# Patient Record
Sex: FEMALE | Race: WHITE | Employment: OTHER | URBAN - METROPOLITAN AREA
[De-identification: names, ages, dates, MRNs, and addresses within clinical notes are randomized per-mention and may not be internally consistent; named-entity substitution may affect disease eponyms.]

---

## 2022-01-18 PROBLEM — M81.0 OSTEOPOROSIS: Status: ACTIVE | Noted: 2022-01-10

## 2022-01-18 PROBLEM — F32.A DEPRESSION: Status: ACTIVE | Noted: 2020-10-09

## 2022-01-18 PROBLEM — H40.9 GLAUCOMA: Status: ACTIVE | Noted: 2022-01-10

## 2022-01-18 PROBLEM — F03.90 DEMENTIA (H): Status: ACTIVE | Noted: 2022-01-10

## 2022-01-18 PROBLEM — R42 POSTURAL DIZZINESS: Status: ACTIVE | Noted: 2020-07-29

## 2022-01-18 PROBLEM — Z87.898 HISTORY OF PALPITATIONS: Status: ACTIVE | Noted: 2022-01-10

## 2022-01-18 PROBLEM — U07.1 COVID-19: Status: ACTIVE | Noted: 2020-11-27

## 2022-01-18 PROBLEM — G45.9 TIA (TRANSIENT ISCHEMIC ATTACK): Status: ACTIVE | Noted: 2022-01-03

## 2022-01-18 PROBLEM — Z79.01 LONG TERM CURRENT USE OF ANTICOAGULANT: Status: ACTIVE | Noted: 2022-01-10

## 2022-01-18 PROBLEM — E53.8 INADEQUATE VITAMIN B12 INTAKE: Status: ACTIVE | Noted: 2020-06-05

## 2022-01-18 PROBLEM — Z66: Status: ACTIVE | Noted: 2021-06-03

## 2022-01-18 PROBLEM — I25.10 CORONARY ATHEROSCLEROSIS: Status: ACTIVE | Noted: 2022-01-10

## 2022-01-18 PROBLEM — H35.30 MACULAR DEGENERATION: Status: ACTIVE | Noted: 2022-01-10

## 2022-01-18 PROBLEM — F41.1 GENERALIZED ANXIETY DISORDER: Status: ACTIVE | Noted: 2022-01-10

## 2022-01-18 NOTE — PROGRESS NOTES
[facility] GERIATRIC SERVICES  PRIMARY CARE PROVIDER AND CLINIC: Britni Bhagat [facility] [address] [facility]  / [facility]; Phone: [phone]; Fax: [phone]  Chief Complaint   Patient presents with     Hospital F/U     [place] 1/1/2022 - 1/18/2022      [place] Medical Record Number: [id number]  Place of Service where encounter took place: [facility] [[id number]]    Jamia Coley is a 89 year old (2/28/1932), admitted to the above facility from  [place]. Hospital stay 1/1/22 through 1/18/22.  HPI:    Brief Summary of Hospital Course: Jamia Coley lives in assisted living facility at baseline and has a past medical history of CHF, afib, CAD, hypertension, dementia, depression and anxiety, glaucoma, macular degeneration, hypothyroidism, obesity, WATSON. S/he was admitted to the hospital with increased confusion, changes in speech and L facial droop and found to have new CVA in L Lateral clark and R occipital lobe. 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 The hospital stay was complicated with new finding of dysphagia and unable to tolerate oral intake so Gtube was placed.  She also developed thrush, and a rash of unknown origin.    Follow up needed:  -Neurology, [facility], Dr. Hartley, per routine  -consider Vascular follow up if failing medical management    Today:  Jamia is lying in bed and reports no pain, no concerns.  She is able to answer questions with 1-2 words, but is clearly confused.  Daughter reports baseline advanced dementia, but prior to CVA was completely independent in all ADLs and functioned well in her assisted living facility.     CODE STATUS/ADVANCE DIRECTIVES DISCUSSION:  No CPR- Do NOT Intubate   ALLERGIES: Penicillins, Donepezil, Galantamine, and Rivastigmine  PAST MEDICAL HISTORY:  has a past medical history of ACS (acute coronary syndrome) (H) (10/1/2012),  Anxiety, Cataract, Dementia (H) (1/10/2022), History of syncope (8/5/2016), Hypertension, Thyroid disease, and Transient ischemic attack (TIA), and cerebral infarction without residual deficits(V12.54) (11/30/2010).  PAST SURGICAL HISTORY:  has a past surgical history that includes GYN surgery.  FAMILY HISTORY: family history is not on file.  SOCIAL HISTORY:  reports that she does not drink alcohol.  Patient's living condition: lives in an assisted living facility    Post Discharge Medication Reconciliation Status: discharge medications reconciled and changed, per note/orders  Current Outpatient Medications   Medication Sig     acetaminophen (TYLENOL) 32 mg/mL liquid Take 650 mg by mouth every 6 hours as needed for pain 650 mg = 20.3 ml     alendronate (FOSAMAX) 70 MG tablet Take 70 mg by mouth every 7 days On Sundays     amLODIPine (NORVASC) 5 MG tablet 5 mg by Per G Tube route daily     apixaban ANTICOAGULANT (ELIQUIS) 2.5 MG tablet 2.5 mg by Per G Tube route 2 times daily     bisacodyl (DULCOLAX) 10 MG suppository Place 10 mg rectally nightly as needed for constipation     brimonidine (ALPHAGAN) 0.2 % ophthalmic solution Place 1 drop Into the left eye 2 times daily     cetirizine (ZYRTEC) 5 MG tablet 5 mg by Per G Tube route daily     diphenhydrAMINE (BENADRYL) 12.5 MG/5ML solution 25 mg by Per G Tube route every 6 hours as needed for itching     dorzolamide-timolol PF (COSOPT) 22.3-6.8 MG/ML opthalmic solution Place 1 drop Into the left eye 2 times daily     enalapril (VASOTEC) 20 MG tablet Take 20 mg by mouth daily      latanoprost (XALATAN) 0.005 % ophthalmic solution Place 1 drop Into the left eye At Bedtime     levothyroxine (SYNTHROID) 150 MCG tablet 150 mcg by Per G Tube route every evening Patient takes 125 mcg tabs Q AM and 150 mcg tabs Q PM     levothyroxine (SYNTHROID/LEVOTHROID) 125 MCG tablet Take 125 mcg by mouth every morning Patient takes 125 mcg tabs Q AM and 150 mcg tabs Q PM     lisinopril  (ZESTRIL) 20 MG tablet 20 mg by Per G Tube route daily     meclizine (ANTIVERT) 12.5 MG tablet Take 12.5 mg by mouth nightly as needed for dizziness     Multiple Vitamins-Minerals (OCUVITE ADULT 50+) CAPS Take 1 capsule by mouth daily     nystatin (MYCOSTATIN) 561240 UNIT/ML suspension Take 500,000 Units by mouth 4 times daily     polyethylene glycol (MIRALAX) 17 GM/Dose powder Take 17 g by mouth daily     psyllium (METAMUCIL) 28 % packet 1 packet by Per G Tube route 2 times daily     QUEtiapine (SEROQUEL) 25 MG tablet Take 25-50 mg by mouth 2 times daily Scheduled 1 tablet (25 mg) PO Q morning AND 2 tabs (50 mg) PO at bedtime - patient also takes one 50 mh tablet at bedtime PRN     QUEtiapine (SEROQUEL) 50 MG tablet 50 mg by Per G Tube route nightly as needed Patient also takes scheduled 25 mg tablets - 1 tablet (25 mg) PO Q morning AND 2 tabs (50 mg) PO at bedtime     rosuvastatin (CRESTOR) 5 MG tablet 5 mg by Per G Tube route daily     sertraline (ZOLOFT) 20 MG/ML (HIGH CONC) solution 50 mg by Per G Tube route daily 2.5 ml = 50 mg     vitamin B-12 (CYANOCOBALAMIN) 1000 MCG tablet Take 2,500 mcg by mouth every morning     vitamin D3 (CHOLECALCIFEROL) 50 mcg (2000 units) tablet Take 1 tablet by mouth daily     ASPIRIN EC PO Take 325 mg by mouth daily.   (Patient not taking: Reported on 1/19/2022)     atorvastatin (LIPITOR) 40 MG tablet Take 1 tablet by mouth daily. (Patient not taking: Reported on 1/19/2022)     HYDROCHLOROTHIAZIDE PO Take 25 mg by mouth daily.   (Patient not taking: Reported on 1/19/2022)     LORAZEPAM PO Take 0.5 mg by mouth every 8 hours as needed.   (Patient not taking: Reported on 1/19/2022)     metoprolol (LOPRESSOR) 25 MG tablet Take 2 tablets by mouth 2 times daily. Indications: High Blood Pressure (Patient not taking: Reported on 1/19/2022)     multivitamin, therapeutic with minerals (THERA-VIT-M) TABS Take 1 tablet by mouth daily.   (Patient not taking: Reported on 1/19/2022)     Omega-3  "Fatty Acids (OMEGA-3 FISH OIL PO) Take 1 g by mouth daily.   (Patient not taking: Reported on 1/19/2022)     omeprazole 20 MG tablet Take 1 tablet by mouth daily. Take 30-60 minutes before a meal. (Patient not taking: Reported on 1/19/2022)     potassium chloride (KLOR-CON) 20 MEQ packet Take 20 mEq by mouth daily.   (Patient not taking: Reported on 1/19/2022)     RANITIDINE HCL PO Take 150 mg by mouth 2 times daily.   (Patient not taking: Reported on 1/19/2022)     SERTRALINE HCL PO Take 50 mg by mouth daily.   (Patient not taking: Reported on 1/19/2022)     VITAMIN E NATURAL PO Take 400 Units by mouth daily.   (Patient not taking: Reported on 1/19/2022)     No current facility-administered medications for this visit.     ROS:  Unobtainable secondary to cognitive impairment    Vitals:  /70   Pulse 78   Temp 98.8  F (37.1  C)   Resp 18   Ht 1.613 m (5' 3.5\")   Wt 67.1 kg (148 lb)   SpO2 94%   BMI 25.81 kg/m    Exam:  GENERAL APPEARANCE:  Alert, in no acute distress   HEAD:  Normal, normocephalic, atraumatic  EYE EXAM: normal external eye, conjunctiva, lids, MIKE  CHEST/RESP:  respiratory effort normal, no respiratory distress, lung sounds CTA    CV:  Rate reg, rhythm reg, no murmur, no peripheral edema   M/S:   extremities abnormal, gait abnormal-not ambulating at this time, muscle tone flaccid in R UE, digits and nails normal   SKIN EXAM:  Mild R UE edema  NEUROLOGIC EXAM: Cranial nerves 2-12 are grossly normal.  No tremor, gross motor movement at baseline.   PSYCH:  At baseline, has advanced dementia.  She is able to answer simple questions with 1-2 word answers, mood appropriate     Recent labs in EPIC reviewed by me today.  and Care Everywhere     Assessment/Plan:  History of CVA (cerebrovascular accident)  TIA (transient ischemic attack)  Patient with new finding of CVA with significant deficits including dysphagia, word finding difficulty, and R sided weakness.  Requiring total assist with all " ADLs at this time.   -Therapy for strengthening and improved mobility, goal to return to prior level of function   -Speech therapy to evaluate and treat    -monitor for new symptoms     Feeding by G-tube (H)  Dysphagia, unspecified type  Currently has Gtube with gastropexy sutures in place with recommendations to remove those sutures in 10 days (about 1/22/22).  Tolerating Jevity 1.5 at 40 ml/hr continuous at this time.  She also is eating small amounts of puree diet with honey thick liquid.   -continue with speech therapy  -consider repeat video swallow prn     Dementia with behavioral disturbance, unspecified dementia type (H)  Per daughter's report, at baseline has significant dementia.  However, was able to manage all ADLs without assistance at her assisted living facility.   -cognitive testing     Paroxysmal atrial fibrillation (H)  Long term current use of anticoagulant  Per history, no signs of bleeding. Condition and other health concerns as discussed.  She is on apixaban 2.5 mg BID  -continue apixaban      Essential hypertension  Chronic congestive heart failure, unspecified heart failure type (H)  Past medical history significant for hypertension, CHF.  On amlodipine, lisinopril.  BP today within normal limits, no edema, no significant dyspnea.   -monitor for symptoms  -check BMP on 1/24/22     Depression, unspecified depression type  Currently on sertraline, with history significant for depression.  She is also observed to be on quetiapine for 14 days prn for likely symptoms of delirium.    -monitor for symptoms  -PHQ9     Thrush  Severe thrush when in the hospital, tongue with yellowish furry patch in place  -schedule nystatin but will need to swab the mouth QID x 10 days as she is not able to S&S     Orders:    Discontinue scheduled miralax    Clarify nystatin suspension to 5 ml QID x 10 days - apply with mouth swab to oral mucosa and tongue due to thrush    Change ocuvite capsule to house stock Ocuvit  1 tab daily crushed in Gtube for supplement     Labs 1/24/22 to include BMP, CBC, B12     Total time spent with patient visit at the skilled nursing facility was 39 min including patient visit, review of past records and in person discussion with daughter . Greater than 50% of total time spent with counseling and coordinating care due to tenuous situation with patient at high risk for complications due to new CVA, dysphagia, counseling regarding coordination of care and care planning in skilled nursing facility as well as expected length of stay, therapy services, and discharge planning, with patient's goal to return to assisted living facility  when goals have been met .     Electronically signed by:  JOSH Mancilla CNP

## 2022-01-19 ENCOUNTER — TRANSITIONAL CARE UNIT VISIT (OUTPATIENT)
Dept: GERIATRICS | Facility: CLINIC | Age: 87
End: 2022-01-19
Payer: MEDICARE

## 2022-01-19 VITALS
HEART RATE: 78 BPM | WEIGHT: 148 LBS | TEMPERATURE: 98.8 F | RESPIRATION RATE: 18 BRPM | OXYGEN SATURATION: 94 % | BODY MASS INDEX: 25.27 KG/M2 | DIASTOLIC BLOOD PRESSURE: 70 MMHG | SYSTOLIC BLOOD PRESSURE: 120 MMHG | HEIGHT: 64 IN

## 2022-01-19 DIAGNOSIS — Z93.1 FEEDING BY G-TUBE (H): ICD-10-CM

## 2022-01-19 DIAGNOSIS — F03.91 DEMENTIA WITH BEHAVIORAL DISTURBANCE, UNSPECIFIED DEMENTIA TYPE: ICD-10-CM

## 2022-01-19 DIAGNOSIS — Z79.01 LONG TERM CURRENT USE OF ANTICOAGULANT: ICD-10-CM

## 2022-01-19 DIAGNOSIS — I48.0 PAROXYSMAL ATRIAL FIBRILLATION (H): ICD-10-CM

## 2022-01-19 DIAGNOSIS — I50.9 CHRONIC CONGESTIVE HEART FAILURE, UNSPECIFIED HEART FAILURE TYPE (H): ICD-10-CM

## 2022-01-19 DIAGNOSIS — B37.0 THRUSH: ICD-10-CM

## 2022-01-19 DIAGNOSIS — Z86.73 HISTORY OF CVA (CEREBROVASCULAR ACCIDENT): Primary | ICD-10-CM

## 2022-01-19 DIAGNOSIS — F32.A DEPRESSION, UNSPECIFIED DEPRESSION TYPE: ICD-10-CM

## 2022-01-19 DIAGNOSIS — G45.9 TIA (TRANSIENT ISCHEMIC ATTACK): ICD-10-CM

## 2022-01-19 DIAGNOSIS — R13.10 DYSPHAGIA, UNSPECIFIED TYPE: ICD-10-CM

## 2022-01-19 DIAGNOSIS — I10 ESSENTIAL HYPERTENSION: ICD-10-CM

## 2022-01-19 PROBLEM — Z87.898 HISTORY OF PALPITATIONS: Status: RESOLVED | Noted: 2022-01-10 | Resolved: 2022-01-19

## 2022-01-19 PROBLEM — F03.90 DEMENTIA (H): Status: RESOLVED | Noted: 2022-01-10 | Resolved: 2022-01-19

## 2022-01-19 PROBLEM — R42 POSTURAL DIZZINESS: Status: RESOLVED | Noted: 2020-07-29 | Resolved: 2022-01-19

## 2022-01-19 PROBLEM — Z66: Status: RESOLVED | Noted: 2021-06-03 | Resolved: 2022-01-19

## 2022-01-19 PROCEDURE — 99310 SBSQ NF CARE HIGH MDM 45: CPT | Performed by: NURSE PRACTITIONER

## 2022-01-19 RX ORDER — POLYETHYLENE GLYCOL 3350 17 G/17G
17 POWDER, FOR SOLUTION ORAL DAILY
COMMUNITY
Start: 2022-01-19 | End: 2022-01-23

## 2022-01-19 RX ORDER — LISINOPRIL 20 MG/1
20 TABLET ORAL DAILY
COMMUNITY
Start: 2022-01-19

## 2022-01-19 RX ORDER — MV-MN/OM3/DHA/EPA/FISH/LUT/ZEA 250-5-1 MG
1 CAPSULE ORAL DAILY
COMMUNITY
Start: 2022-01-19 | End: 2022-01-23

## 2022-01-19 RX ORDER — LEVOTHYROXINE SODIUM 125 UG/1
125 TABLET ORAL EVERY MORNING
COMMUNITY
Start: 2022-01-19 | End: 2022-01-23

## 2022-01-19 RX ORDER — LATANOPROST 50 UG/ML
1 SOLUTION/ DROPS OPHTHALMIC AT BEDTIME
COMMUNITY
Start: 2022-01-19

## 2022-01-19 RX ORDER — BISACODYL 10 MG
10 SUPPOSITORY, RECTAL RECTAL
COMMUNITY
Start: 2022-01-19 | End: 2022-02-08

## 2022-01-19 RX ORDER — QUETIAPINE FUMARATE 25 MG/1
25-50 TABLET, FILM COATED ORAL 2 TIMES DAILY
COMMUNITY
Start: 2022-01-19 | End: 2022-01-23

## 2022-01-19 RX ORDER — ROSUVASTATIN CALCIUM 5 MG/1
5 TABLET, COATED ORAL DAILY
Status: ON HOLD | COMMUNITY
Start: 2022-01-19 | End: 2022-02-25

## 2022-01-19 RX ORDER — ALENDRONATE SODIUM 70 MG/1
70 TABLET ORAL
COMMUNITY
Start: 2022-01-19

## 2022-01-19 RX ORDER — SERTRALINE HYDROCHLORIDE 20 MG/ML
100 SOLUTION ORAL DAILY
COMMUNITY
Start: 2022-01-19

## 2022-01-19 RX ORDER — CETIRIZINE HYDROCHLORIDE 5 MG/1
5 TABLET ORAL DAILY PRN
COMMUNITY
Start: 2022-01-19 | End: 2022-02-08

## 2022-01-19 RX ORDER — DIPHENHYDRAMINE HCL 12.5MG/5ML
25 LIQUID (ML) ORAL EVERY 6 HOURS PRN
COMMUNITY
Start: 2022-01-19 | End: 2022-01-26

## 2022-01-19 RX ORDER — AMLODIPINE BESYLATE 5 MG/1
5 TABLET ORAL DAILY
COMMUNITY
Start: 2022-01-19 | End: 2022-02-08

## 2022-01-19 RX ORDER — NYSTATIN 100000/ML
500000 SUSPENSION, ORAL (FINAL DOSE FORM) ORAL 4 TIMES DAILY
COMMUNITY
Start: 2022-01-19 | End: 2022-02-08

## 2022-01-19 RX ORDER — BRIMONIDINE TARTRATE 2 MG/ML
1 SOLUTION/ DROPS OPHTHALMIC 2 TIMES DAILY
COMMUNITY
Start: 2022-01-19

## 2022-01-19 RX ORDER — CHOLECALCIFEROL (VITAMIN D3) 50 MCG
1 TABLET ORAL DAILY
COMMUNITY
Start: 2022-01-19

## 2022-01-19 RX ORDER — MECLIZINE HCL 12.5 MG 12.5 MG/1
12.5 TABLET ORAL
COMMUNITY
Start: 2022-01-19 | End: 2022-01-26

## 2022-01-19 RX ORDER — QUETIAPINE FUMARATE 50 MG/1
50 TABLET, FILM COATED ORAL
COMMUNITY
Start: 2022-01-19 | End: 2022-01-23

## 2022-01-19 ASSESSMENT — MIFFLIN-ST. JEOR: SCORE: 1073.38

## 2022-01-19 NOTE — LETTER
1/19/2022        RE: Jamia Coley  [address] [facility] [address]        [facility] GERIATRIC SERVICES  PRIMARY CARE PROVIDER AND CLINIC: Britni Bhagat [facility] [address] [place] [address]  / [place]; Phone: [phone]; Fax: [phone]  Chief Complaint   Patient presents with     Hospital F/U     [place] 1/1/2022 - 1/18/2022      [place] Medical Record Number: [id number]  Place of Service where encounter took place: [facility]) [[id number]]    Jamia Coley is a 89 year old (2/28/1932), admitted to the above facility from  [place]. Hospital stay 1/1/22 through 1/18/22.  HPI:    Brief Summary of Hospital Course: Jamia Coley lives in assisted living facility at baseline and has a past medical history of CHF, afib, CAD, hypertension, dementia, depression and anxiety, glaucoma, macular degeneration, hypothyroidism, obesity, WATSON. S/he was admitted to the hospital with increased confusion, changes in speech and L facial droop and found to have new CVA in L Lateral clark and R occipital lobe. 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 The hospital stay was complicated with new finding of dysphagia and unable to tolerate oral intake so Gtube was placed.  She also developed thrush, and a rash of unknown origin.    Follow up needed:  -Neurology, [facility], Dr. Hartley, per routine  -consider Vascular follow up if failing medical management    Today:  Jamia is lying in bed and reports no pain, no concerns.  She is able to answer questions with 1-2 words, but is clearly confused.  Daughter reports baseline advanced dementia, but prior to CVA was completely independent in all ADLs and functioned well in her assisted living facility.     CODE STATUS/ADVANCE DIRECTIVES DISCUSSION:  No CPR- Do NOT Intubate   ALLERGIES: Penicillins, Donepezil, Galantamine, and Rivastigmine  PAST MEDICAL  HISTORY:  has a past medical history of ACS (acute coronary syndrome) (H) (10/1/2012), Anxiety, Cataract, Dementia (H) (1/10/2022), History of syncope (8/5/2016), Hypertension, Thyroid disease, and Transient ischemic attack (TIA), and cerebral infarction without residual deficits(V12.54) (11/30/2010).  PAST SURGICAL HISTORY:  has a past surgical history that includes GYN surgery.  FAMILY HISTORY: family history is not on file.  SOCIAL HISTORY:  reports that she does not drink alcohol.  Patient's living condition: lives in an assisted living facility    Post Discharge Medication Reconciliation Status: discharge medications reconciled and changed, per note/orders  Current Outpatient Medications   Medication Sig     acetaminophen (TYLENOL) 32 mg/mL liquid Take 650 mg by mouth every 6 hours as needed for pain 650 mg = 20.3 ml     alendronate (FOSAMAX) 70 MG tablet Take 70 mg by mouth every 7 days On Sundays     amLODIPine (NORVASC) 5 MG tablet 5 mg by Per G Tube route daily     apixaban ANTICOAGULANT (ELIQUIS) 2.5 MG tablet 2.5 mg by Per G Tube route 2 times daily     bisacodyl (DULCOLAX) 10 MG suppository Place 10 mg rectally nightly as needed for constipation     brimonidine (ALPHAGAN) 0.2 % ophthalmic solution Place 1 drop Into the left eye 2 times daily     cetirizine (ZYRTEC) 5 MG tablet 5 mg by Per G Tube route daily     diphenhydrAMINE (BENADRYL) 12.5 MG/5ML solution 25 mg by Per G Tube route every 6 hours as needed for itching     dorzolamide-timolol PF (COSOPT) 22.3-6.8 MG/ML opthalmic solution Place 1 drop Into the left eye 2 times daily     enalapril (VASOTEC) 20 MG tablet Take 20 mg by mouth daily      latanoprost (XALATAN) 0.005 % ophthalmic solution Place 1 drop Into the left eye At Bedtime     levothyroxine (SYNTHROID) 150 MCG tablet 150 mcg by Per G Tube route every evening Patient takes 125 mcg tabs Q AM and 150 mcg tabs Q PM     levothyroxine (SYNTHROID/LEVOTHROID) 125 MCG tablet Take 125 mcg by mouth  every morning Patient takes 125 mcg tabs Q AM and 150 mcg tabs Q PM     lisinopril (ZESTRIL) 20 MG tablet 20 mg by Per G Tube route daily     meclizine (ANTIVERT) 12.5 MG tablet Take 12.5 mg by mouth nightly as needed for dizziness     Multiple Vitamins-Minerals (OCUVITE ADULT 50+) CAPS Take 1 capsule by mouth daily     nystatin (MYCOSTATIN) 233598 UNIT/ML suspension Take 500,000 Units by mouth 4 times daily     polyethylene glycol (MIRALAX) 17 GM/Dose powder Take 17 g by mouth daily     psyllium (METAMUCIL) 28 % packet 1 packet by Per G Tube route 2 times daily     QUEtiapine (SEROQUEL) 25 MG tablet Take 25-50 mg by mouth 2 times daily Scheduled 1 tablet (25 mg) PO Q morning AND 2 tabs (50 mg) PO at bedtime - patient also takes one 50 mh tablet at bedtime PRN     QUEtiapine (SEROQUEL) 50 MG tablet 50 mg by Per G Tube route nightly as needed Patient also takes scheduled 25 mg tablets - 1 tablet (25 mg) PO Q morning AND 2 tabs (50 mg) PO at bedtime     rosuvastatin (CRESTOR) 5 MG tablet 5 mg by Per G Tube route daily     sertraline (ZOLOFT) 20 MG/ML (HIGH CONC) solution 50 mg by Per G Tube route daily 2.5 ml = 50 mg     vitamin B-12 (CYANOCOBALAMIN) 1000 MCG tablet Take 2,500 mcg by mouth every morning     vitamin D3 (CHOLECALCIFEROL) 50 mcg (2000 units) tablet Take 1 tablet by mouth daily     ASPIRIN EC PO Take 325 mg by mouth daily.   (Patient not taking: Reported on 1/19/2022)     atorvastatin (LIPITOR) 40 MG tablet Take 1 tablet by mouth daily. (Patient not taking: Reported on 1/19/2022)     HYDROCHLOROTHIAZIDE PO Take 25 mg by mouth daily.   (Patient not taking: Reported on 1/19/2022)     LORAZEPAM PO Take 0.5 mg by mouth every 8 hours as needed.   (Patient not taking: Reported on 1/19/2022)     metoprolol (LOPRESSOR) 25 MG tablet Take 2 tablets by mouth 2 times daily. Indications: High Blood Pressure (Patient not taking: Reported on 1/19/2022)     multivitamin, therapeutic with minerals (THERA-VIT-M) TABS Take  "1 tablet by mouth daily.   (Patient not taking: Reported on 1/19/2022)     Omega-3 Fatty Acids (OMEGA-3 FISH OIL PO) Take 1 g by mouth daily.   (Patient not taking: Reported on 1/19/2022)     omeprazole 20 MG tablet Take 1 tablet by mouth daily. Take 30-60 minutes before a meal. (Patient not taking: Reported on 1/19/2022)     potassium chloride (KLOR-CON) 20 MEQ packet Take 20 mEq by mouth daily.   (Patient not taking: Reported on 1/19/2022)     RANITIDINE HCL PO Take 150 mg by mouth 2 times daily.   (Patient not taking: Reported on 1/19/2022)     SERTRALINE HCL PO Take 50 mg by mouth daily.   (Patient not taking: Reported on 1/19/2022)     VITAMIN E NATURAL PO Take 400 Units by mouth daily.   (Patient not taking: Reported on 1/19/2022)     No current facility-administered medications for this visit.     ROS:  Unobtainable secondary to cognitive impairment    Vitals:  /70   Pulse 78   Temp 98.8  F (37.1  C)   Resp 18   Ht 1.613 m (5' 3.5\")   Wt 67.1 kg (148 lb)   SpO2 94%   BMI 25.81 kg/m    Exam:  GENERAL APPEARANCE:  Alert, in no acute distress   HEAD:  Normal, normocephalic, atraumatic  EYE EXAM: normal external eye, conjunctiva, lids, MIKE  CHEST/RESP:  respiratory effort normal, no respiratory distress, lung sounds CTA    CV:  Rate reg, rhythm reg, no murmur, no peripheral edema   M/S:   extremities abnormal, gait abnormal-not ambulating at this time, muscle tone flaccid in R UE, digits and nails normal   SKIN EXAM:  Mild R UE edema  NEUROLOGIC EXAM: Cranial nerves 2-12 are grossly normal.  No tremor, gross motor movement at baseline.   PSYCH:  At baseline, has advanced dementia.  She is able to answer simple questions with 1-2 word answers, mood appropriate     Recent labs in EPIC reviewed by me today.  and Care Everywhere     Assessment/Plan:  History of CVA (cerebrovascular accident)  TIA (transient ischemic attack)  Patient with new finding of CVA with significant deficits including dysphagia, " word finding difficulty, and R sided weakness.  Requiring total assist with all ADLs at this time.   -Therapy for strengthening and improved mobility, goal to return to prior level of function   -Speech therapy to evaluate and treat    -monitor for new symptoms     Feeding by G-tube (H)  Dysphagia, unspecified type  Currently has Gtube with gastropexy sutures in place with recommendations to remove those sutures in 10 days (about 1/22/22).  Tolerating Jevity 1.5 at 40 ml/hr continuous at this time.  She also is eating small amounts of puree diet with honey thick liquid.   -continue with speech therapy  -consider repeat video swallow prn     Dementia with behavioral disturbance, unspecified dementia type (H)  Per daughter's report, at baseline has significant dementia.  However, was able to manage all ADLs without assistance at her assisted living facility.   -cognitive testing     Paroxysmal atrial fibrillation (H)  Long term current use of anticoagulant  Per history, no signs of bleeding. Condition and other health concerns as discussed.  She is on apixaban 2.5 mg BID  -continue apixaban      Essential hypertension  Chronic congestive heart failure, unspecified heart failure type (H)  Past medical history significant for hypertension, CHF.  On amlodipine, lisinopril.  BP today within normal limits, no edema, no significant dyspnea.   -monitor for symptoms  -check BMP on 1/24/22     Depression, unspecified depression type  Currently on sertraline, with history significant for depression.  She is also observed to be on quetiapine for 14 days prn for likely symptoms of delirium.    -monitor for symptoms  -PHQ9     Thrush  Severe thrush when in the hospital, tongue with yellowish furry patch in place  -schedule nystatin but will need to swab the mouth QID x 10 days as she is not able to S&S     Orders:    Discontinue scheduled miralax    Clarify nystatin suspension to 5 ml QID x 10 days - apply with mouth swab to oral  mucosa and tongue due to thrush    Change ocuvite capsule to house stock Ocuvit 1 tab daily crushed in Gtube for supplement     Labs 1/24/22 to include BMP, CBC, B12     Total time spent with patient visit at the skilled nursing facility was 39 min including patient visit, review of past records and in person discussion with daughter . Greater than 50% of total time spent with counseling and coordinating care due to tenuous situation with patient at high risk for complications due to new CVA, dysphagia, counseling regarding coordination of care and care planning in skilled nursing facility as well as expected length of stay, therapy services, and discharge planning, with patient's goal to return to assisted living facility  when goals have been met .     Electronically signed by:  JOSH Mancilla CNP         Sincerely,        JOSH Mancilla CNP

## 2022-01-20 RX ORDER — VITS A,C,E/LUTEIN/MINERALS 300MCG-200
1 TABLET ORAL DAILY
COMMUNITY

## 2022-01-21 NOTE — PROGRESS NOTES
[facility] GERIATRICS    Chief Complaint   Patient presents with     RECHECK     HPI: Jamia oCley is a 89 year old (2/28/1932), who is being seen today for an episodic care visit at: [facility] (U) [[id number]].     Brief Summary of Hospital Course: Jamia Coley lives in assisted living facility at baseline and has a past medical history of CHF, afib, CAD, hypertension, dementia, depression and anxiety, glaucoma, macular degeneration, hypothyroidism, obesity, WATSON. S/he was admitted to the hospital with increased confusion, changes in speech and L facial droop and found to have new CVA in L Lateral clark and R occipital lobe. 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 The hospital stay was complicated with new finding of dysphagia and unable to tolerate oral intake so Gtube was placed.  She also developed thrush, and a rash of unknown origin.    Follow up needed:  -Neurology, [facility], Dr. Hartley, per routine  -consider Vascular follow up if failing medical management     Recent changes:  -1/19 - discontinue scheduled miralax, clarify nystatin S&S    Chief Concerns Today:  Nursing staff notes some R UE edema which is troublesome to patient and causing some discomfort.  Due to have gastropexy button sutures removed today.  Deacon seems to be tolerating gtube feedings overnight.     Allergies, as well as Past Medical, Surgical, and Family History reviewed in Epic.    Medication list reviewed and reconciled.  Current Outpatient Medications:      acetaminophen (TYLENOL) 32 mg/mL liquid, Take 650 mg by mouth every 6 hours as needed for pain 650 mg = 20.3 ml, Disp: , Rfl:      alendronate (FOSAMAX) 70 MG tablet, Take 70 mg by mouth every 7 days On Sundays, Disp: , Rfl:      amLODIPine (NORVASC) 5 MG tablet, 5 mg by Per G Tube route daily, Disp: , Rfl:      apixaban ANTICOAGULANT (ELIQUIS) 2.5 MG tablet, 2.5 mg by Per G  Tube route 2 times daily, Disp: , Rfl:      bisacodyl (DULCOLAX) 10 MG suppository, Place 10 mg rectally nightly as needed for constipation, Disp: , Rfl:      brimonidine (ALPHAGAN) 0.2 % ophthalmic solution, Place 1 drop Into the left eye 2 times daily, Disp: , Rfl:      cetirizine (ZYRTEC) 5 MG tablet, 5 mg by Per G Tube route daily, Disp: , Rfl:      diphenhydrAMINE (BENADRYL) 12.5 MG/5ML solution, 25 mg by Per G Tube route every 6 hours as needed for itching, Disp: , Rfl:      dorzolamide-timolol PF (COSOPT) 22.3-6.8 MG/ML opthalmic solution, Place 1 drop Into the left eye 2 times daily, Disp: , Rfl:      latanoprost (XALATAN) 0.005 % ophthalmic solution, Place 1 drop Into the left eye At Bedtime, Disp: , Rfl:      levothyroxine (SYNTHROID) 150 MCG tablet, 150 mcg by Per G Tube route every evening, Disp: , Rfl:      lisinopril (ZESTRIL) 20 MG tablet, 20 mg by Per G Tube route daily, Disp: , Rfl:      meclizine (ANTIVERT) 12.5 MG tablet, Take 12.5 mg by mouth nightly as needed for dizziness, Disp: , Rfl:      multivitamin (OCUVITE) TABS tablet, Take 1 tablet by mouth daily, Disp: , Rfl:      nystatin (MYCOSTATIN) 995818 UNIT/ML suspension, Take 500,000 Units by mouth 4 times daily Swab oral mucosa, Disp: , Rfl:      psyllium (METAMUCIL) 28 % packet, 1 packet by Per G Tube route 2 times daily, Disp: , Rfl:      QUEtiapine (SEROQUEL) 50 MG tablet, Take 50 mg by mouth nightly as needed, Disp: , Rfl:      rosuvastatin (CRESTOR) 5 MG tablet, 5 mg by Per G Tube route daily, Disp: , Rfl:      sertraline (ZOLOFT) 20 MG/ML (HIGH CONC) solution, 50 mg by Per G Tube route daily 2.5 ml = 50 mg, Disp: , Rfl:      vitamin B-12 (CYANOCOBALAMIN) 1000 MCG tablet, Take 2,500 mcg by mouth every morning, Disp: , Rfl:      vitamin D3 (CHOLECALCIFEROL) 50 mcg (2000 units) tablet, Take 1 tablet by mouth daily, Disp: , Rfl:     ROS:  Limited secondary to cognitive impairment but today pt reports no pain, no other new concerns  "    Vitals:  /68   Pulse 76   Temp 97.3  F (36.3  C)   Resp 15   Ht 1.676 m (5' 6\")   Wt 67.7 kg (149 lb 4.8 oz)   SpO2 96%   BMI 24.10 kg/m    Exam:  GENERAL APPEARANCE:  Alert, in no distress   HEAD:  Normal, normocephalic, atraumatic  EYE EXAM: normal external eye, conjunctiva, lids, MIKE  CHEST/RESP:  respiratory effort normal, no respiratory distress, lung sounds CTA    CV:  Rate reg, rhythm reg, no murmur, mild dependent peripheral edema R UE  M/S:   extremities abnormal, gait abnormal-walking only short distances with rolling walker , muscle tone decreased on R , digits and nails normal   SKIN EXAM:  R hand slightly swollen and cool, good pulses, dependent edema , abdomen at Gtube site is slightly reddened and irritated, tender to touch   NEUROLOGIC EXAM: Cranial nerves 2-12 are grossly normal.  No tremor, gross motor movement at baseline.   PSYCH:  at baseline mentation, alert and oriented , mood appropriate       Most Recent 3 CBC's:Recent Labs   Lab Test 01/24/22  0525   WBC 8.8   HGB 13.4   *        Most Recent 3 BMP's:Recent Labs   Lab Test 01/24/22  0525      POTASSIUM 3.8   CHLORIDE 102   CO2 32   BUN 23   CR 0.66   ANIONGAP 4   KYLE 8.9   *     Most Recent Anemia Panel:Recent Labs   Lab Test 01/24/22  0525   WBC 8.8   HGB 13.4   HCT 41.9   *      B12 1,526*      Assessment/Plan:  History of CVA (cerebrovascular accident)  Patient with R UE edema, likely due to CVA.  Mild compression has been difficulty and painful at times over the weekend.  Starting elevation of extremity today.   -continue to monitor     Dysphagia, unspecified type  Feeding by G-tube (H)  Feeding now nocturnal to support oral nutrition daytimes. Tolerating this so far. Weight stable.     Dementia with behavioral disturbance, unspecified dementia type (H)  Confused at baseline, this remains stable.     Edema of right upper arm  Slightly improved from report today. Trying " compression      Orders:    Discontinue the following due to non-use  o Benadryl  o Meclizine  o Prn quetiapine     Discontinue Vit B12    Change cetirizine to 5 mg daily po prn for itching     Follow up routinely     Electronically signed by: JOSH Mancilla CNP

## 2022-01-23 RX ORDER — QUETIAPINE FUMARATE 50 MG/1
50 TABLET, FILM COATED ORAL
COMMUNITY
End: 2022-01-26

## 2022-01-24 ENCOUNTER — LAB REQUISITION (OUTPATIENT)
Dept: LAB | Facility: CLINIC | Age: 87
End: 2022-01-24
Payer: MEDICARE

## 2022-01-24 ENCOUNTER — TRANSITIONAL CARE UNIT VISIT (OUTPATIENT)
Dept: GERIATRICS | Facility: CLINIC | Age: 87
End: 2022-01-24
Payer: MEDICARE

## 2022-01-24 ENCOUNTER — DOCUMENTATION ONLY (OUTPATIENT)
Dept: OTHER | Facility: CLINIC | Age: 87
End: 2022-01-24

## 2022-01-24 VITALS
SYSTOLIC BLOOD PRESSURE: 131 MMHG | OXYGEN SATURATION: 96 % | HEIGHT: 66 IN | WEIGHT: 149.3 LBS | TEMPERATURE: 97.3 F | RESPIRATION RATE: 15 BRPM | BODY MASS INDEX: 23.99 KG/M2 | HEART RATE: 76 BPM | DIASTOLIC BLOOD PRESSURE: 68 MMHG

## 2022-01-24 DIAGNOSIS — I10 ESSENTIAL (PRIMARY) HYPERTENSION: ICD-10-CM

## 2022-01-24 DIAGNOSIS — D64.9 ANEMIA, UNSPECIFIED: ICD-10-CM

## 2022-01-24 DIAGNOSIS — R60.0 EDEMA OF RIGHT UPPER ARM: ICD-10-CM

## 2022-01-24 DIAGNOSIS — E55.9 VITAMIN D DEFICIENCY, UNSPECIFIED: ICD-10-CM

## 2022-01-24 DIAGNOSIS — Z93.1 FEEDING BY G-TUBE (H): ICD-10-CM

## 2022-01-24 DIAGNOSIS — F03.91 DEMENTIA WITH BEHAVIORAL DISTURBANCE, UNSPECIFIED DEMENTIA TYPE: ICD-10-CM

## 2022-01-24 DIAGNOSIS — R13.10 DYSPHAGIA, UNSPECIFIED TYPE: ICD-10-CM

## 2022-01-24 DIAGNOSIS — Z86.73 HISTORY OF CVA (CEREBROVASCULAR ACCIDENT): Primary | ICD-10-CM

## 2022-01-24 LAB
ANION GAP SERPL CALCULATED.3IONS-SCNC: 4 MMOL/L (ref 3–14)
BUN SERPL-MCNC: 23 MG/DL (ref 7–30)
CALCIUM SERPL-MCNC: 8.9 MG/DL (ref 8.5–10.1)
CHLORIDE BLD-SCNC: 102 MMOL/L (ref 94–109)
CO2 SERPL-SCNC: 32 MMOL/L (ref 20–32)
CREAT SERPL-MCNC: 0.66 MG/DL (ref 0.52–1.04)
ERYTHROCYTE [DISTWIDTH] IN BLOOD BY AUTOMATED COUNT: 14.1 % (ref 10–15)
GFR SERPL CREATININE-BSD FRML MDRD: 83 ML/MIN/1.73M2
GLUCOSE BLD-MCNC: 129 MG/DL (ref 70–99)
HCT VFR BLD AUTO: 41.9 % (ref 35–47)
HGB BLD-MCNC: 13.4 G/DL (ref 11.7–15.7)
HOLD SPECIMEN: NORMAL
MCH RBC QN AUTO: 32.4 PG (ref 26.5–33)
MCHC RBC AUTO-ENTMCNC: 32 G/DL (ref 31.5–36.5)
MCV RBC AUTO: 102 FL (ref 78–100)
PLATELET # BLD AUTO: 276 10E3/UL (ref 150–450)
POTASSIUM BLD-SCNC: 3.8 MMOL/L (ref 3.4–5.3)
RBC # BLD AUTO: 4.13 10E6/UL (ref 3.8–5.2)
SODIUM SERPL-SCNC: 138 MMOL/L (ref 133–144)
VIT B12 SERPL-MCNC: 1526 PG/ML (ref 193–986)
WBC # BLD AUTO: 8.8 10E3/UL (ref 4–11)

## 2022-01-24 PROCEDURE — P9603 ONE-WAY ALLOW PRORATED MILES: HCPCS | Performed by: NURSE PRACTITIONER

## 2022-01-24 PROCEDURE — 36415 COLL VENOUS BLD VENIPUNCTURE: CPT | Performed by: NURSE PRACTITIONER

## 2022-01-24 PROCEDURE — 82607 VITAMIN B-12: CPT | Performed by: NURSE PRACTITIONER

## 2022-01-24 PROCEDURE — 99309 SBSQ NF CARE MODERATE MDM 30: CPT | Performed by: NURSE PRACTITIONER

## 2022-01-24 PROCEDURE — 80048 BASIC METABOLIC PNL TOTAL CA: CPT | Performed by: NURSE PRACTITIONER

## 2022-01-24 PROCEDURE — 85027 COMPLETE CBC AUTOMATED: CPT | Performed by: NURSE PRACTITIONER

## 2022-01-24 ASSESSMENT — MIFFLIN-ST. JEOR: SCORE: 1118.97

## 2022-01-24 NOTE — LETTER
1/24/2022        RE: Jamia Coley  [address] [address]        [facility] GERIATRICS    Chief Complaint   Patient presents with     RECHECK     HPI: Jamia Coley is a 89 year old (2/28/1932), who is being seen today for an episodic care visit at: [facility] ([facility]) [[id number]].     Brief Summary of Hospital Course: Jamia Coley lives in assisted living facility at baseline and has a past medical history of CHF, afib, CAD, hypertension, dementia, depression and anxiety, glaucoma, macular degeneration, hypothyroidism, obesity, WATSON. S/he was admitted to the hospital with increased confusion, changes in speech and L facial droop and found to have new CVA in L Lateral clark and R occipital lobe. 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 The hospital stay was complicated with new finding of dysphagia and unable to tolerate oral intake so Gtube was placed.  She also developed thrush, and a rash of unknown origin.    Follow up needed:  -Neurology, [facility], Dr. Hartley, per routine  -consider Vascular follow up if failing medical management     Recent changes:  -1/19 - discontinue scheduled miralax, clarify nystatin S&S    Chief Concerns Today:  Nursing staff notes some R UE edema which is troublesome to patient and causing some discomfort.  Due to have gastropexy button sutures removed today.  Deacon seems to be tolerating gtube feedings overnight.     Allergies, as well as Past Medical, Surgical, and Family History reviewed in Epic.    Medication list reviewed and reconciled.  Current Outpatient Medications:      acetaminophen (TYLENOL) 32 mg/mL liquid, Take 650 mg by mouth every 6 hours as needed for pain 650 mg = 20.3 ml, Disp: , Rfl:      alendronate (FOSAMAX) 70 MG tablet, Take 70 mg by mouth every 7 days On Sundays, Disp: , Rfl:      amLODIPine (NORVASC) 5 MG tablet, 5 mg by Per G Tube route daily,  Disp: , Rfl:      apixaban ANTICOAGULANT (ELIQUIS) 2.5 MG tablet, 2.5 mg by Per G Tube route 2 times daily, Disp: , Rfl:      bisacodyl (DULCOLAX) 10 MG suppository, Place 10 mg rectally nightly as needed for constipation, Disp: , Rfl:      brimonidine (ALPHAGAN) 0.2 % ophthalmic solution, Place 1 drop Into the left eye 2 times daily, Disp: , Rfl:      cetirizine (ZYRTEC) 5 MG tablet, 5 mg by Per G Tube route daily, Disp: , Rfl:      diphenhydrAMINE (BENADRYL) 12.5 MG/5ML solution, 25 mg by Per G Tube route every 6 hours as needed for itching, Disp: , Rfl:      dorzolamide-timolol PF (COSOPT) 22.3-6.8 MG/ML opthalmic solution, Place 1 drop Into the left eye 2 times daily, Disp: , Rfl:      latanoprost (XALATAN) 0.005 % ophthalmic solution, Place 1 drop Into the left eye At Bedtime, Disp: , Rfl:      levothyroxine (SYNTHROID) 150 MCG tablet, 150 mcg by Per G Tube route every evening, Disp: , Rfl:      lisinopril (ZESTRIL) 20 MG tablet, 20 mg by Per G Tube route daily, Disp: , Rfl:      meclizine (ANTIVERT) 12.5 MG tablet, Take 12.5 mg by mouth nightly as needed for dizziness, Disp: , Rfl:      multivitamin (OCUVITE) TABS tablet, Take 1 tablet by mouth daily, Disp: , Rfl:      nystatin (MYCOSTATIN) 167601 UNIT/ML suspension, Take 500,000 Units by mouth 4 times daily Swab oral mucosa, Disp: , Rfl:      psyllium (METAMUCIL) 28 % packet, 1 packet by Per G Tube route 2 times daily, Disp: , Rfl:      QUEtiapine (SEROQUEL) 50 MG tablet, Take 50 mg by mouth nightly as needed, Disp: , Rfl:      rosuvastatin (CRESTOR) 5 MG tablet, 5 mg by Per G Tube route daily, Disp: , Rfl:      sertraline (ZOLOFT) 20 MG/ML (HIGH CONC) solution, 50 mg by Per G Tube route daily 2.5 ml = 50 mg, Disp: , Rfl:      vitamin B-12 (CYANOCOBALAMIN) 1000 MCG tablet, Take 2,500 mcg by mouth every morning, Disp: , Rfl:      vitamin D3 (CHOLECALCIFEROL) 50 mcg (2000 units) tablet, Take 1 tablet by mouth daily, Disp: , Rfl:     ROS:  Limited secondary to  "cognitive impairment but today pt reports no pain, no other new concerns     Vitals:  /68   Pulse 76   Temp 97.3  F (36.3  C)   Resp 15   Ht 1.676 m (5' 6\")   Wt 67.7 kg (149 lb 4.8 oz)   SpO2 96%   BMI 24.10 kg/m    Exam:  GENERAL APPEARANCE:  Alert, in no distress   HEAD:  Normal, normocephalic, atraumatic  EYE EXAM: normal external eye, conjunctiva, lids, MIKE  CHEST/RESP:  respiratory effort normal, no respiratory distress, lung sounds CTA    CV:  Rate reg, rhythm reg, no murmur, mild dependent peripheral edema R UE  M/S:   extremities abnormal, gait abnormal-walking only short distances with rolling walker , muscle tone decreased on R , digits and nails normal   SKIN EXAM:  R hand slightly swollen and cool, good pulses, dependent edema , abdomen at Gtube site is slightly reddened and irritated, tender to touch   NEUROLOGIC EXAM: Cranial nerves 2-12 are grossly normal.  No tremor, gross motor movement at baseline.   PSYCH:  at baseline mentation, alert and oriented , mood appropriate       Most Recent 3 CBC's:Recent Labs   Lab Test 01/24/22  0525   WBC 8.8   HGB 13.4   *        Most Recent 3 BMP's:Recent Labs   Lab Test 01/24/22  0525      POTASSIUM 3.8   CHLORIDE 102   CO2 32   BUN 23   CR 0.66   ANIONGAP 4   KYLE 8.9   *     Most Recent Anemia Panel:Recent Labs   Lab Test 01/24/22  0525   WBC 8.8   HGB 13.4   HCT 41.9   *      B12 1,526*      Assessment/Plan:  History of CVA (cerebrovascular accident)  Patient with R UE edema, likely due to CVA.  Mild compression has been difficulty and painful at times over the weekend.  Starting elevation of extremity today.   -continue to monitor     Dysphagia, unspecified type  Feeding by G-tube (H)  Feeding now nocturnal to support oral nutrition daytimes. Tolerating this so far. Weight stable.     Dementia with behavioral disturbance, unspecified dementia type (H)  Confused at baseline, this remains stable.     Edema " of right upper arm  Slightly improved from report today. Trying compression      Orders:    Discontinue the following due to non-use  o Benadryl  o Meclizine  o Prn quetiapine     Discontinue Vit B12    Change cetirizine to 5 mg daily po prn for itching     Follow up routinely     Electronically signed by: JOSH Mancilla CNP         Sincerely,        JOSH Mancilla CNP

## 2022-01-26 NOTE — PROGRESS NOTES
[facility] GERIATRICS    Chief Complaint   Patient presents with     RECHECK     HPI: Jamia Coley is a 89 year old (2/28/1932), who is being seen today for an episodic care visit at: [facility] (U) [[id number]].     Brief Summary of Hospital Course: Jamia Coley lives in assisted living facility at baseline and has a past medical history of CHF, afib, CAD, hypertension, dementia, depression and anxiety, glaucoma, macular degeneration, hypothyroidism, obesity, WATSON. S/he was admitted to the hospital with increased confusion, changes in speech and L facial droop and found to have new CVA in L Lateral clark and R occipital lobe. 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 The hospital stay was complicated with new finding of dysphagia and unable to tolerate oral intake so Gtube was placed.  She also developed thrush, and a rash of unknown origin.    Follow up needed:  -Neurology, [facility], Dr. Hartley, per routine  -consider Vascular follow up if failing medical management     Recent changes:  -1/19 - discontinue scheduled miralax, clarify nystatin S&S  -1/24 - discontinue benadryl, meclizine, prn quetiapine, vit B12 and decrease cetirizine to prn   -1/27 - MOCA 1/30, dependent for all ADLs and transfers    Chief Concerns Today:  Jamia is restless, agitated and has difficulty with expressing herself due to significant hard of hearing and ongoing dementia.  She is observed sitting in wheelchair or recliner restlessly, with furrowed brow and frequently calling out.     Allergies, as well as Past Medical, Surgical, and Family History reviewed in Epic.    Medication list reviewed and reconciled.  Current Outpatient Medications:      acetaminophen (TYLENOL) 32 mg/mL liquid, Take 650 mg by mouth every 6 hours as needed for pain 650 mg = 20.3 ml, Disp: , Rfl:      alendronate (FOSAMAX) 70 MG tablet, Take 70 mg by mouth  "every 7 days On Sundays, Disp: , Rfl:      amLODIPine (NORVASC) 5 MG tablet, 5 mg by Per G Tube route daily, Disp: , Rfl:      apixaban ANTICOAGULANT (ELIQUIS) 2.5 MG tablet, 2.5 mg by Per G Tube route 2 times daily, Disp: , Rfl:      bisacodyl (DULCOLAX) 10 MG suppository, Place 10 mg rectally nightly as needed for constipation, Disp: , Rfl:      brimonidine (ALPHAGAN) 0.2 % ophthalmic solution, Place 1 drop Into the left eye 2 times daily, Disp: , Rfl:      cetirizine (ZYRTEC) 5 MG tablet, 5 mg by Per G Tube route daily as needed, Disp: , Rfl:      dorzolamide-timolol PF (COSOPT) 22.3-6.8 MG/ML opthalmic solution, Place 1 drop Into the left eye 2 times daily, Disp: , Rfl:      latanoprost (XALATAN) 0.005 % ophthalmic solution, Place 1 drop Into the left eye At Bedtime, Disp: , Rfl:      levothyroxine (SYNTHROID) 150 MCG tablet, 150 mcg by Per G Tube route every evening, Disp: , Rfl:      lisinopril (ZESTRIL) 20 MG tablet, 20 mg by Per G Tube route daily, Disp: , Rfl:      multivitamin (OCUVITE) TABS tablet, Take 1 tablet by mouth daily, Disp: , Rfl:      nystatin (MYCOSTATIN) 901642 UNIT/ML suspension, Take 500,000 Units by mouth 4 times daily Swab oral mucosa, Disp: , Rfl:      psyllium (METAMUCIL) 28 % packet, 1 packet by Per G Tube route 2 times daily, Disp: , Rfl:      rosuvastatin (CRESTOR) 5 MG tablet, 5 mg by Per G Tube route daily, Disp: , Rfl:      sertraline (ZOLOFT) 20 MG/ML (HIGH CONC) solution, 50 mg by Per G Tube route daily 2.5 ml = 50 mg, Disp: , Rfl:      vitamin D3 (CHOLECALCIFEROL) 50 mcg (2000 units) tablet, Take 1 tablet by mouth daily, Disp: , Rfl:     ROS:  Limited secondary to cognitive impairment but today pt reports she is not in pain    Vitals:  /72   Pulse 73   Temp 98.1  F (36.7  C)   Resp 19   Ht 1.676 m (5' 6\")   Wt 67.5 kg (148 lb 12.8 oz)   SpO2 95%   BMI 24.02 kg/m    Exam:  GENERAL APPEARANCE:  Alert, in obvious restless distress   HEAD:  Normal, normocephalic, " atraumatic  EYE EXAM: normal external eye, conjunctiva, lids, MIKE  CHEST/RESP:  respiratory effort normal, no respiratory distress, lung sounds essentially clear but she is unable to follow directions   CV:  Rate reg, rhythm reg, no murmur, 2+ peripheral edema bilateral LE and R UE   : urine reported to be foul smelling and cloudy  M/S:   extremities normal, gait abnormal-requires total assist to ambulate , muscle tone normal , digits and nails normal   SKIN EXAM:  Bilateral LE with diffuse spider veins, mild edema and no redness  NEUROLOGIC EXAM: Cranial nerves 2-12 are grossly normal.  No tremor, gross motor movement at baseline.   PSYCH:  at baseline mentation with significant dementia, today is restless and agitated, mood anxious and restless    Recent labs in [facility] reviewed by me today.      Assessment/Plan:  History of CVA (cerebrovascular accident)  Dysphagia, unspecified type  Feeding by G-tube (H)  Patient with CVA and dysphagia, feedings through Gtube and eating small amounts on her own.  She is significantly more restless last few days.   -Therapy for diet, strengthening and improved mobility, goal to return to prior level of function    -check UA for infection     Dementia with behavioral disturbance, unspecified dementia type (H)  Chronic condition, ongoing decline expected. Her agitation is worse as she is unable to understand her surroundings, unable to clearly define concerns.   -schedule tylenol as she may be in pain     Chronic congestive heart failure, unspecified heart failure type (H)  Known history of CHF, with most recent echo showing EF of 25-30% without LVH.  On amlodipine, lisinopril.  Now with some bilateral LE edema, no obvious congestion but she is unable to effectively follow directions for deep breathing, unable to endorse feelings of dyspnea    -start lasix and K+ as this may be contributing to agitation as well       Orders:    Labs 1/28/22 to include BNP, BMP, CBC  w/diff    UA/conditional UC via strait cath today for LOC change    OK for tubigrips/tensoshapes, on in am and off at hs diagnosis LE edema    Schedule tylenol 20.3 ml QID for pain  Per gtube    Lasix 20 mg daily for CHF, per gtube    Potassium 10 meq per gtube daily for supplement on lasix     Follow up with in 1 week      Electronically signed by: JOSH Mancilla CNP

## 2022-01-27 ENCOUNTER — TRANSITIONAL CARE UNIT VISIT (OUTPATIENT)
Dept: GERIATRICS | Facility: CLINIC | Age: 87
End: 2022-01-27
Payer: MEDICARE

## 2022-01-27 ENCOUNTER — LAB REQUISITION (OUTPATIENT)
Dept: LAB | Facility: CLINIC | Age: 87
End: 2022-01-27
Payer: MEDICARE

## 2022-01-27 VITALS
BODY MASS INDEX: 23.91 KG/M2 | SYSTOLIC BLOOD PRESSURE: 135 MMHG | OXYGEN SATURATION: 95 % | HEIGHT: 66 IN | RESPIRATION RATE: 19 BRPM | HEART RATE: 73 BPM | TEMPERATURE: 98.1 F | DIASTOLIC BLOOD PRESSURE: 72 MMHG | WEIGHT: 148.8 LBS

## 2022-01-27 DIAGNOSIS — R13.10 DYSPHAGIA, UNSPECIFIED TYPE: ICD-10-CM

## 2022-01-27 DIAGNOSIS — I50.9 CHRONIC CONGESTIVE HEART FAILURE, UNSPECIFIED HEART FAILURE TYPE (H): ICD-10-CM

## 2022-01-27 DIAGNOSIS — Z93.1 FEEDING BY G-TUBE (H): ICD-10-CM

## 2022-01-27 DIAGNOSIS — Z86.73 HISTORY OF CVA (CEREBROVASCULAR ACCIDENT): Primary | ICD-10-CM

## 2022-01-27 DIAGNOSIS — R30.0 DYSURIA: ICD-10-CM

## 2022-01-27 DIAGNOSIS — F03.91 DEMENTIA WITH BEHAVIORAL DISTURBANCE, UNSPECIFIED DEMENTIA TYPE: ICD-10-CM

## 2022-01-27 LAB
ALBUMIN UR-MCNC: NEGATIVE MG/DL
APPEARANCE UR: ABNORMAL
BILIRUB UR QL STRIP: NEGATIVE
COLOR UR AUTO: YELLOW
GLUCOSE UR STRIP-MCNC: NEGATIVE MG/DL
HGB UR QL STRIP: NEGATIVE
KETONES UR STRIP-MCNC: NEGATIVE MG/DL
LEUKOCYTE ESTERASE UR QL STRIP: NEGATIVE
MUCOUS THREADS #/AREA URNS LPF: PRESENT /LPF
NITRATE UR QL: NEGATIVE
PH UR STRIP: 7 [PH] (ref 5–7)
RBC URINE: <1 /HPF
SP GR UR STRIP: 1.02 (ref 1–1.03)
SQUAMOUS EPITHELIAL: 3 /HPF
UROBILINOGEN UR STRIP-MCNC: NORMAL MG/DL
WBC URINE: 7 /HPF

## 2022-01-27 PROCEDURE — 87086 URINE CULTURE/COLONY COUNT: CPT | Performed by: FAMILY MEDICINE

## 2022-01-27 PROCEDURE — 99309 SBSQ NF CARE MODERATE MDM 30: CPT | Performed by: NURSE PRACTITIONER

## 2022-01-27 PROCEDURE — 81001 URINALYSIS AUTO W/SCOPE: CPT | Performed by: FAMILY MEDICINE

## 2022-01-27 ASSESSMENT — MIFFLIN-ST. JEOR: SCORE: 1116.7

## 2022-01-27 NOTE — LETTER
1/27/2022        RE: Jamia Coley  [address] [facility] [address]        [facility] GERIATRICS    Chief Complaint   Patient presents with     RECHECK     HPI: Jamia Coley is a 89 year old (2/28/1932), who is being seen today for an episodic care visit at: [facility] ([facility]) [[id number]].     Brief Summary of Hospital Course: Jamia Coley lives in assisted living facility at baseline and has a past medical history of CHF, afib, CAD, hypertension, dementia, depression and anxiety, glaucoma, macular degeneration, hypothyroidism, obesity, WATSON. S/he was admitted to the hospital with increased confusion, changes in speech and L facial droop and found to have new CVA in L Lateral clark and R occipital lobe. 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 The hospital stay was complicated with new finding of dysphagia and unable to tolerate oral intake so Gtube was placed.  She also developed thrush, and a rash of unknown origin.    Follow up needed:  -Neurology, [facility], Dr. Hartley, per routine  -consider Vascular follow up if failing medical management     Recent changes:  -1/19 - discontinue scheduled miralax, clarify nystatin S&S  -1/24 - discontinue benadryl, meclizine, prn quetiapine, vit B12 and decrease cetirizine to prn   -1/27 - MOCA 1/30, dependent for all ADLs and transfers    Chief Concerns Today:  Jamia is restless, agitated and has difficulty with expressing herself due to significant hard of hearing and ongoing dementia.  She is observed sitting in wheelchair or recliner restlessly, with furrowed brow and frequently calling out.     Allergies, as well as Past Medical, Surgical, and Family History reviewed in Epic.    Medication list reviewed and reconciled.  Current Outpatient Medications:      acetaminophen (TYLENOL) 32 mg/mL liquid, Take 650 mg by mouth every 6 hours as needed for pain 650 mg =  "20.3 ml, Disp: , Rfl:      alendronate (FOSAMAX) 70 MG tablet, Take 70 mg by mouth every 7 days On Sundays, Disp: , Rfl:      amLODIPine (NORVASC) 5 MG tablet, 5 mg by Per G Tube route daily, Disp: , Rfl:      apixaban ANTICOAGULANT (ELIQUIS) 2.5 MG tablet, 2.5 mg by Per G Tube route 2 times daily, Disp: , Rfl:      bisacodyl (DULCOLAX) 10 MG suppository, Place 10 mg rectally nightly as needed for constipation, Disp: , Rfl:      brimonidine (ALPHAGAN) 0.2 % ophthalmic solution, Place 1 drop Into the left eye 2 times daily, Disp: , Rfl:      cetirizine (ZYRTEC) 5 MG tablet, 5 mg by Per G Tube route daily as needed, Disp: , Rfl:      dorzolamide-timolol PF (COSOPT) 22.3-6.8 MG/ML opthalmic solution, Place 1 drop Into the left eye 2 times daily, Disp: , Rfl:      latanoprost (XALATAN) 0.005 % ophthalmic solution, Place 1 drop Into the left eye At Bedtime, Disp: , Rfl:      levothyroxine (SYNTHROID) 150 MCG tablet, 150 mcg by Per G Tube route every evening, Disp: , Rfl:      lisinopril (ZESTRIL) 20 MG tablet, 20 mg by Per G Tube route daily, Disp: , Rfl:      multivitamin (OCUVITE) TABS tablet, Take 1 tablet by mouth daily, Disp: , Rfl:      nystatin (MYCOSTATIN) 521328 UNIT/ML suspension, Take 500,000 Units by mouth 4 times daily Swab oral mucosa, Disp: , Rfl:      psyllium (METAMUCIL) 28 % packet, 1 packet by Per G Tube route 2 times daily, Disp: , Rfl:      rosuvastatin (CRESTOR) 5 MG tablet, 5 mg by Per G Tube route daily, Disp: , Rfl:      sertraline (ZOLOFT) 20 MG/ML (HIGH CONC) solution, 50 mg by Per G Tube route daily 2.5 ml = 50 mg, Disp: , Rfl:      vitamin D3 (CHOLECALCIFEROL) 50 mcg (2000 units) tablet, Take 1 tablet by mouth daily, Disp: , Rfl:     ROS:  Limited secondary to cognitive impairment but today pt reports she is not in pain    Vitals:  /72   Pulse 73   Temp 98.1  F (36.7  C)   Resp 19   Ht 1.676 m (5' 6\")   Wt 67.5 kg (148 lb 12.8 oz)   SpO2 95%   BMI 24.02 kg/m    Exam:  GENERAL " APPEARANCE:  Alert, in obvious restless distress   HEAD:  Normal, normocephalic, atraumatic  EYE EXAM: normal external eye, conjunctiva, lids, MIKE  CHEST/RESP:  respiratory effort normal, no respiratory distress, lung sounds essentially clear but she is unable to follow directions   CV:  Rate reg, rhythm reg, no murmur, 2+ peripheral edema bilateral LE and R UE   : urine reported to be foul smelling and cloudy  M/S:   extremities normal, gait abnormal-requires total assist to ambulate , muscle tone normal , digits and nails normal   SKIN EXAM:  Bilateral LE with diffuse spider veins, mild edema and no redness  NEUROLOGIC EXAM: Cranial nerves 2-12 are grossly normal.  No tremor, gross motor movement at baseline.   PSYCH:  at baseline mentation with significant dementia, today is restless and agitated, mood anxious and restless    Recent labs in EPIC reviewed by me today.      Assessment/Plan:  History of CVA (cerebrovascular accident)  Dysphagia, unspecified type  Feeding by G-tube (H)  Patient with CVA and dysphagia, feedings through Gtube and eating small amounts on her own.  She is significantly more restless last few days.   -Therapy for diet, strengthening and improved mobility, goal to return to prior level of function    -check UA for infection     Dementia with behavioral disturbance, unspecified dementia type (H)  Chronic condition, ongoing decline expected. Her agitation is worse as she is unable to understand her surroundings, unable to clearly define concerns.   -schedule tylenol as she may be in pain     Chronic congestive heart failure, unspecified heart failure type (H)  Known history of CHF, with most recent echo showing EF of 25-30% without LVH.  On amlodipine, lisinopril.  Now with some bilateral LE edema, no obvious congestion but she is unable to effectively follow directions for deep breathing, unable to endorse feelings of dyspnea    -start lasix and K+ as this may be contributing to  agitation as well       Orders:    Labs 1/28/22 to include BNP, BMP, CBC w/diff    UA/conditional UC via strait cath today for LOC change    OK for tubigrips/tensoshapes, on in am and off at hs diagnosis LE edema    Schedule tylenol 20.3 ml QID for pain  Per gtube    Lasix 20 mg daily for CHF, per gtube    Potassium 10 meq per gtube daily for supplement on lasix     Follow up with in 1 week      Electronically signed by: JOSH Mancilla CNP         Sincerely,        JOSH Mancilla CNP

## 2022-01-28 ENCOUNTER — TRANSITIONAL CARE UNIT VISIT (OUTPATIENT)
Dept: GERIATRICS | Facility: CLINIC | Age: 87
End: 2022-01-28
Payer: MEDICARE

## 2022-01-28 ENCOUNTER — LAB REQUISITION (OUTPATIENT)
Dept: LAB | Facility: CLINIC | Age: 87
End: 2022-01-28
Payer: MEDICARE

## 2022-01-28 VITALS
OXYGEN SATURATION: 95 % | SYSTOLIC BLOOD PRESSURE: 149 MMHG | WEIGHT: 148.8 LBS | BODY MASS INDEX: 23.91 KG/M2 | HEIGHT: 66 IN | TEMPERATURE: 98.5 F | DIASTOLIC BLOOD PRESSURE: 84 MMHG | RESPIRATION RATE: 20 BRPM | HEART RATE: 61 BPM

## 2022-01-28 DIAGNOSIS — F43.22 ADJUSTMENT DISORDER WITH ANXIOUS MOOD: Primary | ICD-10-CM

## 2022-01-28 DIAGNOSIS — G62.9 NEUROPATHY: ICD-10-CM

## 2022-01-28 DIAGNOSIS — R45.1 RESTLESSNESS AND AGITATION: ICD-10-CM

## 2022-01-28 DIAGNOSIS — R52 PAIN: ICD-10-CM

## 2022-01-28 DIAGNOSIS — Z86.73 HISTORY OF CVA (CEREBROVASCULAR ACCIDENT): ICD-10-CM

## 2022-01-28 DIAGNOSIS — R13.10 DYSPHAGIA, UNSPECIFIED TYPE: ICD-10-CM

## 2022-01-28 LAB
ANION GAP SERPL CALCULATED.3IONS-SCNC: 4 MMOL/L (ref 3–14)
BASOPHILS # BLD AUTO: 0.1 10E3/UL (ref 0–0.2)
BASOPHILS NFR BLD AUTO: 1 %
BUN SERPL-MCNC: 19 MG/DL (ref 7–30)
CALCIUM SERPL-MCNC: 8.6 MG/DL (ref 8.5–10.1)
CHLORIDE BLD-SCNC: 101 MMOL/L (ref 94–109)
CO2 SERPL-SCNC: 32 MMOL/L (ref 20–32)
CREAT SERPL-MCNC: 0.66 MG/DL (ref 0.52–1.04)
EOSINOPHIL # BLD AUTO: 0.2 10E3/UL (ref 0–0.7)
EOSINOPHIL NFR BLD AUTO: 3 %
ERYTHROCYTE [DISTWIDTH] IN BLOOD BY AUTOMATED COUNT: 14.5 % (ref 10–15)
GFR SERPL CREATININE-BSD FRML MDRD: 83 ML/MIN/1.73M2
GLUCOSE BLD-MCNC: 117 MG/DL (ref 70–99)
HCT VFR BLD AUTO: 42.3 % (ref 35–47)
HGB BLD-MCNC: 13.2 G/DL (ref 11.7–15.7)
IMM GRANULOCYTES # BLD: 0 10E3/UL
IMM GRANULOCYTES NFR BLD: 0 %
LYMPHOCYTES # BLD AUTO: 1.3 10E3/UL (ref 0.8–5.3)
LYMPHOCYTES NFR BLD AUTO: 19 %
MCH RBC QN AUTO: 31.9 PG (ref 26.5–33)
MCHC RBC AUTO-ENTMCNC: 31.2 G/DL (ref 31.5–36.5)
MCV RBC AUTO: 102 FL (ref 78–100)
MONOCYTES # BLD AUTO: 0.5 10E3/UL (ref 0–1.3)
MONOCYTES NFR BLD AUTO: 7 %
NEUTROPHILS # BLD AUTO: 4.6 10E3/UL (ref 1.6–8.3)
NEUTROPHILS NFR BLD AUTO: 70 %
NRBC # BLD AUTO: 0 10E3/UL
NRBC BLD AUTO-RTO: 0 /100
NT-PROBNP SERPL-MCNC: 2041 PG/ML (ref 0–450)
PLATELET # BLD AUTO: 263 10E3/UL (ref 150–450)
POTASSIUM BLD-SCNC: 4 MMOL/L (ref 3.4–5.3)
RBC # BLD AUTO: 4.14 10E6/UL (ref 3.8–5.2)
SODIUM SERPL-SCNC: 137 MMOL/L (ref 133–144)
WBC # BLD AUTO: 6.6 10E3/UL (ref 4–11)

## 2022-01-28 PROCEDURE — 80048 BASIC METABOLIC PNL TOTAL CA: CPT | Performed by: NURSE PRACTITIONER

## 2022-01-28 PROCEDURE — 99309 SBSQ NF CARE MODERATE MDM 30: CPT | Performed by: NURSE PRACTITIONER

## 2022-01-28 PROCEDURE — 85025 COMPLETE CBC W/AUTO DIFF WBC: CPT | Performed by: NURSE PRACTITIONER

## 2022-01-28 PROCEDURE — 83880 ASSAY OF NATRIURETIC PEPTIDE: CPT | Performed by: NURSE PRACTITIONER

## 2022-01-28 PROCEDURE — 36415 COLL VENOUS BLD VENIPUNCTURE: CPT | Performed by: NURSE PRACTITIONER

## 2022-01-28 PROCEDURE — P9603 ONE-WAY ALLOW PRORATED MILES: HCPCS | Performed by: NURSE PRACTITIONER

## 2022-01-28 RX ORDER — FUROSEMIDE 20 MG
40 TABLET ORAL DAILY
Status: ON HOLD | COMMUNITY
End: 2022-02-25

## 2022-01-28 RX ORDER — QUETIAPINE FUMARATE 25 MG/1
6.25 TABLET, FILM COATED ORAL EVERY 6 HOURS PRN
COMMUNITY

## 2022-01-28 RX ORDER — GABAPENTIN 100 MG/1
100 CAPSULE ORAL 2 TIMES DAILY
COMMUNITY
End: 2022-02-06

## 2022-01-28 RX ORDER — POTASSIUM CHLORIDE 20MEQ/15ML
15 LIQUID (ML) ORAL DAILY
COMMUNITY

## 2022-01-28 ASSESSMENT — MIFFLIN-ST. JEOR: SCORE: 1116.7

## 2022-01-28 NOTE — PROGRESS NOTES
[facility] GERIATRICS    Chief Complaint   Patient presents with     RECHECK     HPI: Jamia Coley is a 89 year old (2/28/1932), who is being seen today for an episodic care visit at: [facility] ([facility]) [[id number]].     Brief Summary of Hospital Course: Jamia Coley lives in assisted living facility at baseline and has a past medical history of CHF, afib, CAD, hypertension, dementia, depression and anxiety, glaucoma, macular degeneration, hypothyroidism, obesity, WATSON. S/he was admitted to the hospital with increased confusion, changes in speech and L facial droop and found to have new CVA in L Lateral clark and R occipital lobe. 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 The hospital stay was complicated with new finding of dysphagia and unable to tolerate oral intake so Gtube was placed.  She also developed thrush, and a rash of unknown origin.    Follow up needed:  -Neurology, [facility], Dr. Hartley, per routine  -consider Vascular follow up if failing medical management     Recent changes:  -1/19 - discontinue scheduled miralax, clarify nystatin S&S  -1/24 - discontinue benadryl, meclizine, prn quetiapine, vit B12 and decrease cetirizine to prn   -1/27 - MOCA 1/30, dependent for all ADLs and transfers  -1/28/2022 - add gabapentin and quetiapine for pain, anxiety, agitation control     Chief Concerns Today:  Jamia continues to be very anxious, restless, calling out and trying to get out of wheelchair or recliner most of the day.  She does report pain, but unable to describe.  She does not report dyspnea. Thought to have UTI due to foul smelling urine, increased anxiety and restlessness but UA negative for infection.     Allergies, as well as Past Medical, Surgical, and Family History reviewed in Epic.    Medication list reviewed and reconciled.  Current Outpatient Medications:      acetaminophen (TYLENOL) 32 mg/mL  liquid, Take 650 mg by mouth 4 times daily 650 mg = 20.3 ml , Disp: , Rfl:      alendronate (FOSAMAX) 70 MG tablet, Take 70 mg by mouth every 7 days On Sundays, Disp: , Rfl:      amLODIPine (NORVASC) 5 MG tablet, 5 mg by Per G Tube route daily, Disp: , Rfl:      apixaban ANTICOAGULANT (ELIQUIS) 2.5 MG tablet, 2.5 mg by Per G Tube route 2 times daily, Disp: , Rfl:      bisacodyl (DULCOLAX) 10 MG suppository, Place 10 mg rectally nightly as needed for constipation, Disp: , Rfl:      brimonidine (ALPHAGAN) 0.2 % ophthalmic solution, Place 1 drop Into the left eye 2 times daily, Disp: , Rfl:      cetirizine (ZYRTEC) 5 MG tablet, 5 mg by Per G Tube route daily as needed, Disp: , Rfl:      dorzolamide-timolol PF (COSOPT) 22.3-6.8 MG/ML opthalmic solution, Place 1 drop Into the left eye 2 times daily, Disp: , Rfl:      furosemide (LASIX) 20 MG tablet, 20 mg by Oral or Feeding Tube route daily, Disp: , Rfl:      latanoprost (XALATAN) 0.005 % ophthalmic solution, Place 1 drop Into the left eye At Bedtime, Disp: , Rfl:      levothyroxine (SYNTHROID) 150 MCG tablet, 150 mcg by Per G Tube route every evening, Disp: , Rfl:      lisinopril (ZESTRIL) 20 MG tablet, 20 mg by Per G Tube route daily, Disp: , Rfl:      multivitamin (OCUVITE) TABS tablet, Take 1 tablet by mouth daily, Disp: , Rfl:      nystatin (MYCOSTATIN) 144080 UNIT/ML suspension, Take 500,000 Units by mouth 4 times daily Swab oral mucosa, Disp: , Rfl:      potassium chloride (KAYCIEL) 20 MEQ/15ML (10%) solution, 7.5 mLs by Per G Tube route daily, Disp: , Rfl:      psyllium (METAMUCIL) 28 % packet, 1 packet by Per G Tube route 2 times daily, Disp: , Rfl:      rosuvastatin (CRESTOR) 5 MG tablet, 5 mg by Per G Tube route daily, Disp: , Rfl:      sertraline (ZOLOFT) 20 MG/ML (HIGH CONC) solution, 50 mg by Per G Tube route daily 2.5 ml = 50 mg, Disp: , Rfl:      vitamin D3 (CHOLECALCIFEROL) 50 mcg (2000 units) tablet, Take 1 tablet by mouth daily, Disp: , Rfl:  "    ROS:  Unobtainable secondary to cognitive impairment.     Vitals:  BP (!) 149/84   Pulse 61   Temp 98.5  F (36.9  C)   Resp 20   Ht 1.676 m (5' 6\")   Wt 67.5 kg (148 lb 12.8 oz)   SpO2 95%   BMI 24.02 kg/m    Exam:  GENERAL APPEARANCE:  Alert, in obvious distress - furrowed brow, restless, calling out  HEAD:  Normal, normocephalic, atraumatic  EYE EXAM: normal external eye, conjunctiva, lids, MIKE  CHEST/RESP:  respiratory effort normal, no respiratory distress, lung sounds CTA    CV:  Rate reg, rhythm reg, no murmur, trace peripheral edema   M/S:   extremities abnormal-R UE weak, gait abnormal-unable to ambulate, muscle tone normal , digits and nails normal   NEUROLOGIC EXAM: Cranial nerves 2-12 are grossly normal.  No tremor, gross motor movement at baseline.   PSYCH:  at baseline mentation is confused/disoriented, now with increased agitation of unknown reason, restless and calling out, mood anxious       Most Recent 3 CBC's:Recent Labs   Lab Test 01/28/22  0800 01/24/22  0525   WBC 6.6 8.8   HGB 13.2 13.4   * 102*    276     Most Recent 3 BMP's:Recent Labs   Lab Test 01/28/22  0800 01/24/22  0525    138   POTASSIUM 4.0 3.8   CHLORIDE 101 102   CO2 32 32   BUN 19 23   CR 0.66 0.66   ANIONGAP 4 4   KYLE 8.6 8.9   * 129*     Most Recent Urinalysis:Recent Labs   Lab Test 01/27/22  1700   COLOR Yellow   APPEARANCE Slightly Cloudy*   URINEGLC Negative   URINEBILI Negative   URINEKETONE Negative   SG 1.020   UBLD Negative   URINEPH 7.0   PROTEIN Negative   NITRITE Negative   LEUKEST Negative   RBCU <1   WBCU 7*        Assessment/Plan:  Adjustment disorder with anxious mood  Dementia   Delirium  Patient with increased anxiety in the setting of dementia and she cannot tell us what is wrong.  She is restless, calling out, furrowed brow.   -Add gabapentin   -add prn quetiapine (on previously in the hospital)    Pain  Neuropathy  History of CVA (cerebrovascular accident)  Patient with " history of CVA, R weakness and could certainly be in pain.  Added scheduled tylenol yesterday but this is minimally helpful today  -add gabapentin to help with neuropathic pain control and may control some anxiety as well     Dysphagia, unspecified type  Ongoing and chronic,  Using Gtube to meet most nutritional needs      Orders:    Gabapentin 100 mg po daily x 2 days, then increase to 100 mg po BID for pain and dementia with anxiety    Quetiapine 12.5 mg po q6h prn x 14 days for agitation and anxiety related to dementia     Follow up routinely     Electronically signed by: JOSH Mancilla CNP

## 2022-01-28 NOTE — LETTER
1/28/2022        RE: Jamia Coley  [address] [facility] [address]        [facility] GERIATRICS    Chief Complaint   Patient presents with     RECHECK     HPI: Jamia Coley is a 89 year old (2/28/1932), who is being seen today for an episodic care visit at: [place] ([place] [facility]) [[id number]].     Brief Summary of Hospital Course: Jamia Coley lives in assisted living facility at baseline and has a past medical history of CHF, afib, CAD, hypertension, dementia, depression and anxiety, glaucoma, macular degeneration, hypothyroidism, obesity, WATSON. S/he was admitted to the hospital with increased confusion, changes in speech and L facial droop and found to have new CVA in L Lateral clark and R occipital lobe. 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 The hospital stay was complicated with new finding of dysphagia and unable to tolerate oral intake so Gtube was placed.  She also developed thrush, and a rash of unknown origin.    Follow up needed:  -Neurology, [facility], Dr. Hartley, per routine  -consider Vascular follow up if failing medical management     Recent changes:  -1/19 - discontinue scheduled miralax, clarify nystatin S&S  -1/24 - discontinue benadryl, meclizine, prn quetiapine, vit B12 and decrease cetirizine to prn   -1/27 - MOCA 1/30, dependent for all ADLs and transfers  -1/28/2022 - add gabapentin and quetiapine for pain, anxiety, agitation control     Chief Concerns Today:  Jamia continues to be very anxious, restless, calling out and trying to get out of wheelchair or recliner most of the day.  She does report pain, but unable to describe.  She does not report dyspnea. Thought to have UTI due to foul smelling urine, increased anxiety and restlessness but UA negative for infection.     Allergies, as well as Past Medical, Surgical, and Family History reviewed in Epic.    Medication list reviewed  and reconciled.  Current Outpatient Medications:      acetaminophen (TYLENOL) 32 mg/mL liquid, Take 650 mg by mouth 4 times daily 650 mg = 20.3 ml , Disp: , Rfl:      alendronate (FOSAMAX) 70 MG tablet, Take 70 mg by mouth every 7 days On Sundays, Disp: , Rfl:      amLODIPine (NORVASC) 5 MG tablet, 5 mg by Per G Tube route daily, Disp: , Rfl:      apixaban ANTICOAGULANT (ELIQUIS) 2.5 MG tablet, 2.5 mg by Per G Tube route 2 times daily, Disp: , Rfl:      bisacodyl (DULCOLAX) 10 MG suppository, Place 10 mg rectally nightly as needed for constipation, Disp: , Rfl:      brimonidine (ALPHAGAN) 0.2 % ophthalmic solution, Place 1 drop Into the left eye 2 times daily, Disp: , Rfl:      cetirizine (ZYRTEC) 5 MG tablet, 5 mg by Per G Tube route daily as needed, Disp: , Rfl:      dorzolamide-timolol PF (COSOPT) 22.3-6.8 MG/ML opthalmic solution, Place 1 drop Into the left eye 2 times daily, Disp: , Rfl:      furosemide (LASIX) 20 MG tablet, 20 mg by Oral or Feeding Tube route daily, Disp: , Rfl:      latanoprost (XALATAN) 0.005 % ophthalmic solution, Place 1 drop Into the left eye At Bedtime, Disp: , Rfl:      levothyroxine (SYNTHROID) 150 MCG tablet, 150 mcg by Per G Tube route every evening, Disp: , Rfl:      lisinopril (ZESTRIL) 20 MG tablet, 20 mg by Per G Tube route daily, Disp: , Rfl:      multivitamin (OCUVITE) TABS tablet, Take 1 tablet by mouth daily, Disp: , Rfl:      nystatin (MYCOSTATIN) [id number] UNIT/ML suspension, Take 500,000 Units by mouth 4 times daily Swab oral mucosa, Disp: , Rfl:      potassium chloride (KAYCIEL) 20 MEQ/15ML (10%) solution, 7.5 mLs by Per G Tube route daily, Disp: , Rfl:      psyllium (METAMUCIL) 28 % packet, 1 packet by Per G Tube route 2 times daily, Disp: , Rfl:      rosuvastatin (CRESTOR) 5 MG tablet, 5 mg by Per G Tube route daily, Disp: , Rfl:      sertraline (ZOLOFT) 20 MG/ML (HIGH CONC) solution, 50 mg by Per G Tube route daily 2.5 ml = 50 mg, Disp: , Rfl:      vitamin D3  "(CHOLECALCIFEROL) 50 mcg (2000 units) tablet, Take 1 tablet by mouth daily, Disp: , Rfl:     ROS:  Unobtainable secondary to cognitive impairment.     Vitals:  BP (!) 149/84   Pulse 61   Temp 98.5  F (36.9  C)   Resp 20   Ht 1.676 m (5' 6\")   Wt 67.5 kg (148 lb 12.8 oz)   SpO2 95%   BMI 24.02 kg/m    Exam:  GENERAL APPEARANCE:  Alert, in obvious distress - furrowed brow, restless, calling out  HEAD:  Normal, normocephalic, atraumatic  EYE EXAM: normal external eye, conjunctiva, lids, MIKE  CHEST/RESP:  respiratory effort normal, no respiratory distress, lung sounds CTA    CV:  Rate reg, rhythm reg, no murmur, trace peripheral edema   M/S:   extremities abnormal-R UE weak, gait abnormal-unable to ambulate, muscle tone normal , digits and nails normal   NEUROLOGIC EXAM: Cranial nerves 2-12 are grossly normal.  No tremor, gross motor movement at baseline.   PSYCH:  at baseline mentation is confused/disoriented, now with increased agitation of unknown reason, restless and calling out, mood anxious       Most Recent 3 CBC's:Recent Labs   Lab Test 01/28/22  0800 01/24/22  0525   WBC 6.6 8.8   HGB 13.2 13.4   * 102*    276     Most Recent 3 BMP's:Recent Labs   Lab Test 01/28/22  0800 01/24/22  0525    138   POTASSIUM 4.0 3.8   CHLORIDE 101 102   CO2 32 32   BUN 19 23   CR 0.66 0.66   ANIONGAP 4 4   KYLE 8.6 8.9   * 129*     Most Recent Urinalysis:Recent Labs   Lab Test 01/27/22  1700   COLOR Yellow   APPEARANCE Slightly Cloudy*   URINEGLC Negative   URINEBILI Negative   URINEKETONE Negative   SG 1.020   UBLD Negative   URINEPH 7.0   PROTEIN Negative   NITRITE Negative   LEUKEST Negative   RBCU <1   WBCU 7*        Assessment/Plan:  Adjustment disorder with anxious mood  Dementia   Delirium  Patient with increased anxiety in the setting of dementia and she cannot tell us what is wrong.  She is restless, calling out, furrowed brow.   -Add gabapentin   -add prn quetiapine (on previously in the " hospital)    Pain  Neuropathy  History of CVA (cerebrovascular accident)  Patient with history of CVA, R weakness and could certainly be in pain.  Added scheduled tylenol yesterday but this is minimally helpful today  -add gabapentin to help with neuropathic pain control and may control some anxiety as well     Dysphagia, unspecified type  Ongoing and chronic,  Using Gtube to meet most nutritional needs      Orders:    Gabapentin 100 mg po daily x 2 days, then increase to 100 mg po BID for pain and dementia with anxiety    Quetiapine 12.5 mg po q6h prn x 14 days for agitation and anxiety related to dementia     Follow up routinely     Electronically signed by: JOSH Mancilla CNP         Sincerely,        JOSH Mancilla CNP

## 2022-01-29 LAB — BACTERIA UR CULT: NORMAL

## 2022-01-31 NOTE — PROGRESS NOTES
[facility]  PRIMARY CARE PROVIDER AND CLINIC:  Britni Bhagat [facility] [address] PO  / MANJINDEROLA W*  Chief Complaint   Patient presents with     Hospital F/U     [facility] Medical Record Number:  [id number]  Place of Service where encounter took place:  [facility] ON [facility] (U) [[id number]]    Jamia Coley  is a 89 year old  (2/28/1932), admitted to the above facility from  [facility]. Hospital stay 1/1/22 through 1/18/22..  Admitted to this facility for  rehab, medical management and nursing care.    HPI:    HPI information obtained from: facility chart records, facility staff and [facility] Epic chart review.   Brief Summary of Hospital Course:   Patient with PMH pertinent for A. fib, CAD, hypertension, CHF, hospitalized with a CVA in  left lateral clark and right occipital lobe, , Right ICA stenosis (70-80%), dysphagia s/p G-tube placement.       Today:  - Patient is pleasantly confused, and could not contribute to HPI. RN reports patient at times is very anxious and agitated, currently o Seroquel prn.     ===================  CODE STATUS/ADVANCE DIRECTIVES DISCUSSION:   DNR  Patient's living condition: lives in an assisted living facility  ALLERGIES: Penicillins, Donepezil, Galantamine, and Rivastigmine  PAST MEDICAL HISTORY:  has a past medical history of ACS (acute coronary syndrome) (H) (10/1/2012), Anxiety, Cataract, Dementia (H) (1/10/2022), History of syncope (8/5/2016), Hypertension, Thyroid disease, and Transient ischemic attack (TIA), and cerebral infarction without residual deficits(V12.54) (11/30/2010).  PAST SURGICAL HISTORY:   has a past surgical history that includes GYN surgery.  FAMILY HISTORY: patient does know.   SOCIAL HISTORY:   reports that she does not drink alcohol.    Post Discharge Medication Reconciliation Status: discharge medications reconciled and changed, per note/orders  Current Outpatient Medications   Medication Sig Dispense  Refill     acetaminophen (TYLENOL) 32 mg/mL liquid Take 650 mg by mouth 4 times daily 650 mg = 20.3 ml , and 650 mg q6h prn        alendronate (FOSAMAX) 70 MG tablet Take 70 mg by mouth every 7 days On Sundays       amLODIPine (NORVASC) 5 MG tablet 5 mg by Per G Tube route daily       apixaban ANTICOAGULANT (ELIQUIS) 2.5 MG tablet 2.5 mg by Per G Tube route 2 times daily       bisacodyl (DULCOLAX) 10 MG suppository Place 10 mg rectally nightly as needed for constipation       brimonidine (ALPHAGAN) 0.2 % ophthalmic solution Place 1 drop Into the left eye 2 times daily       cetirizine (ZYRTEC) 5 MG tablet 5 mg by Per G Tube route daily as needed       dorzolamide-timolol PF (COSOPT) 22.3-6.8 MG/ML opthalmic solution Place 1 drop Into the left eye 2 times daily       furosemide (LASIX) 20 MG tablet 40 mg by Oral or Feeding Tube route daily       gabapentin (NEURONTIN) 100 MG capsule Take 100 mg by mouth 2 times daily       latanoprost (XALATAN) 0.005 % ophthalmic solution Place 1 drop Into the left eye At Bedtime       levothyroxine (SYNTHROID) 150 MCG tablet 150 mcg by Per G Tube route every evening       lisinopril (ZESTRIL) 20 MG tablet 20 mg by Per G Tube route daily       multivitamin (OCUVITE) TABS tablet Take 1 tablet by mouth daily       potassium chloride (KAYCIEL) 20 MEQ/15ML (10%) solution 15 mLs by Per G Tube route daily       psyllium (METAMUCIL) 28 % packet 1 packet by Per G Tube route 2 times daily       QUEtiapine (SEROQUEL) 25 MG tablet Take 6.25 mg by mouth every 6 hours as needed Agitation and anxiety        rosuvastatin (CRESTOR) 5 MG tablet 5 mg by Per G Tube route daily       sertraline (ZOLOFT) 20 MG/ML (HIGH CONC) solution 50 mg by Per G Tube route daily 2.5 ml = 50 mg       vitamin D3 (CHOLECALCIFEROL) 50 mcg (2000 units) tablet Take 1 tablet by mouth daily       ROS: Unobtainable secondary to cognitive impairment.     Vitals:  /69   Pulse 67   Temp 98.3  F (36.8  C)   Resp 22   Ht  "1.676 m (5' 6\")   Wt 68.7 kg (151 lb 6.4 oz)   SpO2 93%   BMI 24.44 kg/m    Exam:  GENERAL APPEARANCE:  in no distress, cooperative  ENT:  Mouth and posterior oropharynx normal, moist mucous membranes, oral mucosa moist, no lesion noted.   EYES:  EOMI, Pupil rounded and equal.  RESP:  lungs clear to auscultation   CV:  S1S2 audible, regular HR, no murmur appreciated.   ABDOMEN:  soft, NT/ND, BS audible. no mass appreciated on palpation.   M/S:   1+ pitting edema right hand, 3+ right foot, and 1+ left foot.   SKIN:  No rash.   NEURO:  RUE: wiggle fingers (1/5), moves BLE, could not assess strenght due to patient's cognitive status.   PSYCH:  Pleasantly confused, speak a couple of words. Awake, alert to self only. impaired memory.     Lab/Diagnostic data: Reviewed in the chart and EHR.        ASSESSMENT/PLAN:  ---------------------------------  # Patient with PMH pertinent for A. fib, CAD, hypertension, CHF, hospitalized with a CVA in  left lateral clark and right occipital lobe, , Right ICA stenosis (70-80%), dysphagia s/p G-tube placement.   * Evaluated by PMR and felt to benefit from rehab placement  * Right hemiparesis (H)  - started rehab program, very slow progress, current ADLs 6/6.   - Neurology follow up.  - SLP and dietician eval/recommendation.   - G-tube care.   - Cardia wise compensated. CVR. On  Eliquis and lasix, and statin.       Depression, unspecified depression type  Anxiety with agitation and restlessness  Dementia, likely AL of late onset, with BPSD (H)  - MOCA 1/30,   - on zoloft, Seroquel 12.5 mg prn and gabapentin 100 mg bid.   - ongoing restlessness. Given patient has stroke and dementia, antipsychotic could increase risk of CVA.   - will change neurotin to 200 mg tid scheduled, and 100 mg qid prn sx.  If pt does not respond after gabapentin prn, then give Seroquel. This is in attempt to reduce Seroquel.       Electronically signed by:  Luis Chavez MD   "

## 2022-02-01 ENCOUNTER — TRANSITIONAL CARE UNIT VISIT (OUTPATIENT)
Dept: GERIATRICS | Facility: CLINIC | Age: 87
End: 2022-02-01
Payer: MEDICARE

## 2022-02-01 VITALS
DIASTOLIC BLOOD PRESSURE: 69 MMHG | HEIGHT: 66 IN | OXYGEN SATURATION: 93 % | RESPIRATION RATE: 22 BRPM | SYSTOLIC BLOOD PRESSURE: 125 MMHG | BODY MASS INDEX: 24.33 KG/M2 | WEIGHT: 151.4 LBS | HEART RATE: 67 BPM | TEMPERATURE: 98.3 F

## 2022-02-01 DIAGNOSIS — Z93.1 FEEDING BY G-TUBE (H): ICD-10-CM

## 2022-02-01 DIAGNOSIS — I48.21 PERMANENT ATRIAL FIBRILLATION (H): ICD-10-CM

## 2022-02-01 DIAGNOSIS — Z86.73 HISTORY OF CVA (CEREBROVASCULAR ACCIDENT): ICD-10-CM

## 2022-02-01 DIAGNOSIS — G81.91 RIGHT HEMIPARESIS (H): Primary | ICD-10-CM

## 2022-02-01 DIAGNOSIS — F43.22 ADJUSTMENT DISORDER WITH ANXIOUS MOOD: ICD-10-CM

## 2022-02-01 DIAGNOSIS — I50.9 CHRONIC CONGESTIVE HEART FAILURE, UNSPECIFIED HEART FAILURE TYPE (H): ICD-10-CM

## 2022-02-01 DIAGNOSIS — F03.91 DEMENTIA WITH BEHAVIORAL DISTURBANCE, UNSPECIFIED DEMENTIA TYPE: ICD-10-CM

## 2022-02-01 PROCEDURE — 99305 1ST NF CARE MODERATE MDM 35: CPT | Performed by: FAMILY MEDICINE

## 2022-02-01 ASSESSMENT — MIFFLIN-ST. JEOR: SCORE: 1128.5

## 2022-02-01 NOTE — LETTER
2/1/2022        RE: Jamia Coley  [address] [address]        [facility]  PRIMARY CARE PROVIDER AND CLINIC[facility]  [facility] [address] [place] [address] PO  / COLETTE W*  Chief Complaint   Patient presents with     Hospital F/U     [facility] Medical Record Number:  [id number]  Place of Service where encounter took place:  [facility] ([facility]) [[id number]]    Jamia Coley  is a 89 year old  (2/28/1932), admitted to the above facility from  [facility]. Hospital stay 1/1/22 through 1/18/22..  Admitted to this facility for  rehab, medical management and nursing care.    HPI:    HPI information obtained from: facility chart records, facility staff and [facility] chart review.   Brief Summary of Hospital Course:   Patient with PMH pertinent for A. fib, CAD, hypertension, CHF, hospitalized with a CVA in  left lateral clark and right occipital lobe, , Right ICA stenosis (70-80%), dysphagia s/p G-tube placement.       Today:  - Patient is pleasantly confused, and could not contribute to HPI. RN reports patient at times is very anxious and agitated, currently o Seroquel prn.     ===================  CODE STATUS/ADVANCE DIRECTIVES DISCUSSION:   DNR  Patient's living condition: lives in an assisted living facility  ALLERGIES: Penicillins, Donepezil, Galantamine, and Rivastigmine  PAST MEDICAL HISTORY:  has a past medical history of ACS (acute coronary syndrome) (H) (10/1/2012), Anxiety, Cataract, Dementia (H) (1/10/2022), History of syncope (8/5/2016), Hypertension, Thyroid disease, and Transient ischemic attack (TIA), and cerebral infarction without residual deficits(V12.54) (11/30/2010).  PAST SURGICAL HISTORY:   has a past surgical history that includes GYN surgery.  FAMILY HISTORY: patient does know.   SOCIAL HISTORY:   reports that she does not drink alcohol.    Post Discharge Medication Reconciliation Status: discharge medications reconciled and  changed, per note/orders  Current Outpatient Medications   Medication Sig Dispense Refill     acetaminophen (TYLENOL) 32 mg/mL liquid Take 650 mg by mouth 4 times daily 650 mg = 20.3 ml , and 650 mg q6h prn        alendronate (FOSAMAX) 70 MG tablet Take 70 mg by mouth every 7 days On Sundays       amLODIPine (NORVASC) 5 MG tablet 5 mg by Per G Tube route daily       apixaban ANTICOAGULANT (ELIQUIS) 2.5 MG tablet 2.5 mg by Per G Tube route 2 times daily       bisacodyl (DULCOLAX) 10 MG suppository Place 10 mg rectally nightly as needed for constipation       brimonidine (ALPHAGAN) 0.2 % ophthalmic solution Place 1 drop Into the left eye 2 times daily       cetirizine (ZYRTEC) 5 MG tablet 5 mg by Per G Tube route daily as needed       dorzolamide-timolol PF (COSOPT) 22.3-6.8 MG/ML opthalmic solution Place 1 drop Into the left eye 2 times daily       furosemide (LASIX) 20 MG tablet 40 mg by Oral or Feeding Tube route daily       gabapentin (NEURONTIN) 100 MG capsule Take 100 mg by mouth 2 times daily       latanoprost (XALATAN) 0.005 % ophthalmic solution Place 1 drop Into the left eye At Bedtime       levothyroxine (SYNTHROID) 150 MCG tablet 150 mcg by Per G Tube route every evening       lisinopril (ZESTRIL) 20 MG tablet 20 mg by Per G Tube route daily       multivitamin (OCUVITE) TABS tablet Take 1 tablet by mouth daily       potassium chloride (KAYCIEL) 20 MEQ/15ML (10%) solution 15 mLs by Per G Tube route daily       psyllium (METAMUCIL) 28 % packet 1 packet by Per G Tube route 2 times daily       QUEtiapine (SEROQUEL) 25 MG tablet Take 6.25 mg by mouth every 6 hours as needed Agitation and anxiety        rosuvastatin (CRESTOR) 5 MG tablet 5 mg by Per G Tube route daily       sertraline (ZOLOFT) 20 MG/ML (HIGH CONC) solution 50 mg by Per G Tube route daily 2.5 ml = 50 mg       vitamin D3 (CHOLECALCIFEROL) 50 mcg (2000 units) tablet Take 1 tablet by mouth daily       ROS: Unobtainable secondary to cognitive  "impairment.     Vitals:  /69   Pulse 67   Temp 98.3  F (36.8  C)   Resp 22   Ht 1.676 m (5' 6\")   Wt 68.7 kg (151 lb 6.4 oz)   SpO2 93%   BMI 24.44 kg/m    Exam:  GENERAL APPEARANCE:  in no distress, cooperative  ENT:  Mouth and posterior oropharynx normal, moist mucous membranes, oral mucosa moist, no lesion noted.   EYES:  EOMI, Pupil rounded and equal.  RESP:  lungs clear to auscultation   CV:  S1S2 audible, regular HR, no murmur appreciated.   ABDOMEN:  soft, NT/ND, BS audible. no mass appreciated on palpation.   M/S:   1+ pitting edema right hand, 3+ right foot, and 1+ left foot.   SKIN:  No rash.   NEURO:  RUE: wiggle fingers (1/5), moves BLE, could not assess strenght due to patient's cognitive status.   PSYCH:  Pleasantly confused, speak a couple of words. Awake, alert to self only. impaired memory.     Lab/Diagnostic data: Reviewed in the chart and EHR.        ASSESSMENT/PLAN:  ---------------------------------  # Patient with PMH pertinent for A. fib, CAD, hypertension, CHF, hospitalized with a CVA in  left lateral clark and right occipital lobe, , Right ICA stenosis (70-80%), dysphagia s/p G-tube placement.   * Evaluated by PMR and felt to benefit from rehab placement  * Right hemiparesis (H)  - started rehab program, very slow progress, current ADLs 6/6.   - Neurology follow up.  - SLP and dietician eval/recommendation.   - G-tube care.   - Cardia wise compensated. CVR. On  Eliquis and lasix, and statin.       Depression, unspecified depression type  Anxiety with agitation and restlessness  Dementia, likely AL of late onset, with BPSD (H)  - MOCA 1/30,   - on zoloft, Seroquel 12.5 mg prn and gabapentin 100 mg bid.   - ongoing restlessness. Given patient has stroke and dementia, antipsychotic could increase risk of CVA.   - will change neurotin to 200 mg tid scheduled, and 100 mg qid prn sx.  If pt does not respond after gabapentin prn, then give Seroquel. This is in attempt to reduce " Seroquel.       Electronically signed by:  Luis Chavez MD         Sincerely,        Luis Chavez MD

## 2022-02-03 ENCOUNTER — TRANSITIONAL CARE UNIT VISIT (OUTPATIENT)
Dept: GERIATRICS | Facility: CLINIC | Age: 87
End: 2022-02-03
Payer: MEDICARE

## 2022-02-03 VITALS
BODY MASS INDEX: 24.62 KG/M2 | RESPIRATION RATE: 20 BRPM | WEIGHT: 153.2 LBS | HEART RATE: 65 BPM | DIASTOLIC BLOOD PRESSURE: 55 MMHG | SYSTOLIC BLOOD PRESSURE: 112 MMHG | TEMPERATURE: 98 F | HEIGHT: 66 IN | OXYGEN SATURATION: 92 %

## 2022-02-03 DIAGNOSIS — F43.22 ADJUSTMENT DISORDER WITH ANXIOUS MOOD: Primary | ICD-10-CM

## 2022-02-03 DIAGNOSIS — F03.91 DEMENTIA WITH BEHAVIORAL DISTURBANCE, UNSPECIFIED DEMENTIA TYPE: ICD-10-CM

## 2022-02-03 DIAGNOSIS — R13.10 DYSPHAGIA, UNSPECIFIED TYPE: ICD-10-CM

## 2022-02-03 DIAGNOSIS — Z86.73 HISTORY OF CVA (CEREBROVASCULAR ACCIDENT): ICD-10-CM

## 2022-02-03 DIAGNOSIS — R52 PAIN: ICD-10-CM

## 2022-02-03 DIAGNOSIS — I50.9 CHRONIC CONGESTIVE HEART FAILURE, UNSPECIFIED HEART FAILURE TYPE (H): ICD-10-CM

## 2022-02-03 PROCEDURE — 99309 SBSQ NF CARE MODERATE MDM 30: CPT | Performed by: NURSE PRACTITIONER

## 2022-02-03 ASSESSMENT — MIFFLIN-ST. JEOR: SCORE: 1136.66

## 2022-02-03 NOTE — LETTER
2/3/2022        RE: Jamia Coley  [address] [facility] [address]        [facility]    Chief Complaint   Patient presents with     RECHECK     HPI: Jamia Coley is a 89 year old (2/28/1932), who is being seen today for an episodic care visit at: [facility] ([facility]) [[id number]].     Brief Summary of Hospital Course: Jamia Coley lives in assisted living facility at baseline and has a past medical history of CHF, afib, CAD, hypertension, dementia, depression and anxiety, glaucoma, macular degeneration, hypothyroidism, obesity, WATSON. S/he was admitted to the hospital with increased confusion, changes in speech and L facial droop and found to have new CVA in L Lateral clark and R occipital lobe. 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 The hospital stay was complicated with new finding of dysphagia and unable to tolerate oral intake so Gtube was placed.  She also developed thrush, and a rash of unknown origin.    Follow up needed:  -Neurology, [facility], Dr. Hartley, per routine  -consider Vascular follow up if failing medical management     Recent changes:  -1/19 - discontinue scheduled miralax, clarify nystatin S&S  -1/24 - discontinue benadryl, meclizine, prn quetiapine, vit B12 and decrease cetirizine to prn   -1/27 - MOCA 1/30, dependent for all ADLs and transfers  -1/28/2022 - add gabapentin and quetiapine for pain, anxiety, agitation control   2/1 MD visit    Chief Concerns Today:  Better day today. Earlier this week patient was inconsolable with anxiety. Today she is worried that she is dying. Reassurance given and patient seems comfortable.    ALLERGIES: Penicillins, Donepezil, Galantamine, and Rivastigmine  Past Medical, Surgical, Family and Social History reviewed and updated in EPIC.  Medication list and progress notes reviewed in nursing home electronic health record    ROS:  Unobtainable  "secondary to cognitive impairment.     Vitals:  /55   Pulse 65   Temp 98  F (36.7  C)   Resp 20   Ht 1.676 m (5' 6\")   Wt 69.5 kg (153 lb 3.2 oz)   SpO2 92%   BMI 24.73 kg/m    Exam:  GENERAL APPEARANCE:  Alert, in obvious distress - furrowed brow, restless, calling out  HEAD:  Normal, normocephalic, atraumatic  EYE EXAM: normal external eye, conjunctiva, lids, MIKE  CHEST/RESP:  respiratory effort normal, no respiratory distress, lung sounds CTA    CV:  Rate reg, rhythm reg, no murmur, trace peripheral edema   M/S:   extremities abnormal-R UE weak, gait abnormal-unable to ambulate, muscle tone normal , digits and nails normal   NEUROLOGIC EXAM: Cranial nerves 2-12 are grossly normal.  No tremor, gross motor movement at baseline.   PSYCH:  at baseline mentation is confused/disoriented, now with increased agitation of unknown reason, restless and calling out, mood anxious     Assessment/Plan:  Adjustment disorder with anxious mood  Dementia   Patient with increased anxiety in the setting of dementia and she cannot tell us what is wrong. Today is better per nursing. The 12.5 mg of seroquel helped but she slept for several hours.   - continue gabapentin that was added on 1/28 and titrated up.   - decrease Seroquel to 6.25 mg po every 6 hours prn.     Pain  History of CVA (cerebrovascular accident)  Patient with history of CVA, R weakness and could certainly be in pain. Denies pain but unreliable due to dementia.   -add gabapentin to help with neuropathic pain control and may control some anxiety as well     Dysphagia, unspecified type  Ongoing and chronic. Diet upgraded today to pureed diet, nectar thick liquids.  Using Gtube to meet most nutritional needs      CHF  Started on lasix 20 mg on 1/28 for increase in edema. Shortness of breath could be contributing to anxiety as well.  Will recheck BMP and BNP on Friday and consider increasing Lasix if labs are okay.  Wt Readings from Last 4 Encounters: "   02/03/22 69.5 kg (153 lb 3.2 oz)   02/01/22 68.7 kg (151 lb 6.4 oz)   01/28/22 67.5 kg (148 lb 12.8 oz)   01/27/22 67.5 kg (148 lb 12.8 oz)        Orders:    Change Seroquel to 6.25 mg p.o. every 6 hours as needed x14 days    Check CBC, BMP, BNP tomorrow diagnosis edema    Electronically signed by: Susie Bay NP         Sincerely,        Susie Bay NP

## 2022-02-03 NOTE — PROGRESS NOTES
[facility]    Chief Complaint   Patient presents with     RECHECK     HPI: Jamia Coley is a 89 year old (2/28/1932), who is being seen today for an episodic care visit at: [facility] (U) [[id number]].     Brief Summary of Hospital Course: Jamia Coley lives in assisted living facility at baseline and has a past medical history of CHF, afib, CAD, hypertension, dementia, depression and anxiety, glaucoma, macular degeneration, hypothyroidism, obesity, WATSON. S/he was admitted to the hospital with increased confusion, changes in speech and L facial droop and found to have new CVA in L Lateral clark and R occipital lobe. 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 The hospital stay was complicated with new finding of dysphagia and unable to tolerate oral intake so Gtube was placed.  She also developed thrush, and a rash of unknown origin.    Follow up needed:  -Neurology, [facility], Dr. Hartley, per routine  -consider Vascular follow up if failing medical management     Recent changes:  -1/19 - discontinue scheduled miralax, clarify nystatin S&S  -1/24 - discontinue benadryl, meclizine, prn quetiapine, vit B12 and decrease cetirizine to prn   -1/27 - MOCA 1/30, dependent for all ADLs and transfers  -1/28/2022 - add gabapentin and quetiapine for pain, anxiety, agitation control   2/1 MD visit    Chief Concerns Today:  Better day today. Earlier this week patient was inconsolable with anxiety. Today she is worried that she is dying. Reassurance given and patient seems comfortable.    ALLERGIES: Penicillins, Donepezil, Galantamine, and Rivastigmine  Past Medical, Surgical, Family and Social History reviewed and updated in EPIC.  Medication list and progress notes reviewed in nursing home electronic health record    ROS:  Unobtainable secondary to cognitive impairment.     Vitals:  /55   Pulse 65   Temp 98  F  "(36.7  C)   Resp 20   Ht 1.676 m (5' 6\")   Wt 69.5 kg (153 lb 3.2 oz)   SpO2 92%   BMI 24.73 kg/m    Exam:  GENERAL APPEARANCE:  Alert, in obvious distress - furrowed brow, restless, calling out  HEAD:  Normal, normocephalic, atraumatic  EYE EXAM: normal external eye, conjunctiva, lids, MIKE  CHEST/RESP:  respiratory effort normal, no respiratory distress, lung sounds CTA    CV:  Rate reg, rhythm reg, no murmur, trace peripheral edema   M/S:   extremities abnormal-R UE weak, gait abnormal-unable to ambulate, muscle tone normal , digits and nails normal   NEUROLOGIC EXAM: right facial droop  PSYCH:  at baseline mentation is confused/disoriented, now with increased agitation of unknown reason, restless and calling out, mood anxious     Assessment/Plan:  Adjustment disorder with anxious mood  Dementia   Patient with increased anxiety in the setting of dementia and she cannot tell us what is wrong. Today is better per nursing. The 12.5 mg of seroquel helped but she slept for several hours.   - continue gabapentin that was added on 1/28 and titrated up.   - decrease Seroquel to 6.25 mg po every 6 hours prn.     Pain  History of CVA (cerebrovascular accident)  Patient with history of CVA, R weakness and could certainly be in pain. Denies pain but unreliable due to dementia.   -add gabapentin to help with neuropathic pain control and may control some anxiety as well     Dysphagia, unspecified type  Ongoing and chronic. Diet upgraded today to pureed diet, nectar thick liquids.  Using Gtube to meet most nutritional needs      CHF  Started on lasix 20 mg on 1/28 for increase in edema. Shortness of breath could be contributing to anxiety as well.  Will recheck BMP and BNP on Friday and consider increasing Lasix if labs are okay.  Wt Readings from Last 4 Encounters:   02/03/22 69.5 kg (153 lb 3.2 oz)   02/01/22 68.7 kg (151 lb 6.4 oz)   01/28/22 67.5 kg (148 lb 12.8 oz)   01/27/22 67.5 kg (148 lb 12.8 oz)    "     Orders:    Change Seroquel to 6.25 mg p.o. every 6 hours as needed x14 days    Check CBC, BMP, BNP tomorrow diagnosis edema    Electronically signed by: Susie Bay NP

## 2022-02-04 ENCOUNTER — LAB REQUISITION (OUTPATIENT)
Dept: LAB | Facility: CLINIC | Age: 87
End: 2022-02-04
Payer: MEDICARE

## 2022-02-04 ENCOUNTER — TRANSITIONAL CARE UNIT VISIT (OUTPATIENT)
Dept: GERIATRICS | Facility: CLINIC | Age: 87
End: 2022-02-04
Payer: MEDICARE

## 2022-02-04 VITALS
DIASTOLIC BLOOD PRESSURE: 65 MMHG | WEIGHT: 152.3 LBS | OXYGEN SATURATION: 94 % | HEART RATE: 76 BPM | SYSTOLIC BLOOD PRESSURE: 126 MMHG | RESPIRATION RATE: 22 BRPM | TEMPERATURE: 94 F | BODY MASS INDEX: 24.48 KG/M2 | HEIGHT: 66 IN

## 2022-02-04 DIAGNOSIS — R13.10 DYSPHAGIA, UNSPECIFIED TYPE: ICD-10-CM

## 2022-02-04 DIAGNOSIS — F03.91 DEMENTIA WITH BEHAVIORAL DISTURBANCE, UNSPECIFIED DEMENTIA TYPE: ICD-10-CM

## 2022-02-04 DIAGNOSIS — R52 PAIN: ICD-10-CM

## 2022-02-04 DIAGNOSIS — R60.9 EDEMA, UNSPECIFIED: ICD-10-CM

## 2022-02-04 DIAGNOSIS — I50.9 CHRONIC CONGESTIVE HEART FAILURE, UNSPECIFIED HEART FAILURE TYPE (H): ICD-10-CM

## 2022-02-04 DIAGNOSIS — Z86.73 HISTORY OF CVA (CEREBROVASCULAR ACCIDENT): ICD-10-CM

## 2022-02-04 DIAGNOSIS — I50.23 ACUTE ON CHRONIC SYSTOLIC (CONGESTIVE) HEART FAILURE (H): ICD-10-CM

## 2022-02-04 DIAGNOSIS — F43.22 ADJUSTMENT DISORDER WITH ANXIOUS MOOD: Primary | ICD-10-CM

## 2022-02-04 PROCEDURE — 99309 SBSQ NF CARE MODERATE MDM 30: CPT | Performed by: NURSE PRACTITIONER

## 2022-02-04 ASSESSMENT — MIFFLIN-ST. JEOR: SCORE: 1132.58

## 2022-02-04 NOTE — LETTER
2/4/2022        RE: Jamia Coley  [address] [facility] [address]        [facility] GERIATRICS    Chief Complaint   Patient presents with     RECHECK     HPI: Jamia Coley is a 89 year old (2/28/1932), who is being seen today for an episodic care visit at: [facility] ([facility]) [[id number]].     Brief Summary of Hospital Course: Jamia Coley lives in assisted living facility at baseline and has a past medical history of CHF, afib, CAD, hypertension, dementia, depression and anxiety, glaucoma, macular degeneration, hypothyroidism, obesity, WATSON. S/he was admitted to the hospital with increased confusion, changes in speech and L facial droop and found to have new CVA in L Lateral clark and R occipital lobe. 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 The hospital stay was complicated with new finding of dysphagia and unable to tolerate oral intake so Gtube was placed.  She also developed thrush, and a rash of unknown origin.    Follow up needed:  -Neurology, [facility], Dr. Hartley, per routine  -consider Vascular follow up if failing medical management     Recent changes:  -1/19 - discontinue scheduled miralax, clarify nystatin S&S  -1/24 - discontinue benadryl, meclizine, prn quetiapine, vit B12 and decrease cetirizine to prn   -1/27 - MOCA 1/30, dependent for all ADLs and transfers  -1/28/2022 - add gabapentin and quetiapine for pain, anxiety, agitation control   2/1 MD visit  2/3 better anxiety today - orders: Change Seroquel to 6.25 mg p.o. every 6 hours as needed x14 days .   Check CBC, BMP, BNP tomorrow diagnosis edema    Chief Concerns Today:  More anxious today.  Nurse gave patient gabapentin about an hour ago and still quite anxious.  Patient reports that her breathing is a little more difficult.  Recommended to nurse that they give the as needed Seroquel.  Nurse reports that patient refused blood draw this  "morning.    ALLERGIES: Penicillins, Donepezil, Galantamine, and Rivastigmine  Past Medical, Surgical, Family and Social History reviewed and updated in EPIC.  Medication list and progress notes reviewed in nursing home electronic health record    ROS:  Unobtainable secondary to cognitive impairment.     Vitals:  /65   Pulse 76   Temp (!) 94  F (34.4  C)   Resp 22   Ht 1.676 m (5' 6\")   Wt 69.1 kg (152 lb 4.8 oz)   SpO2 94%   BMI 24.58 kg/m    Exam:  GENERAL APPEARANCE:  Alert, anxious  EYE EXAM: normal external eye, conjunctiva, lids, MIKE  CHEST/RESP:  respiratory effort normal, no respiratory distress, lung sounds diminished throughout. Pitting edema moderate of right upper extrem and 2+ pitting of bilateral lower extrem.   CV:  Rate reg, rhythm reg, no murmur, trace peripheral edema   M/S:   extremities abnormal-R UE weak, gait abnormal-unable to ambulate, muscle tone normal , digits and nails normal  NEURO: Right facial droop.   PSYCH:  at baseline mentation is confused/disoriented, now with increased agitation of unknown reason, restless and calling out, mood anxious     Assessment/Plan:  Adjustment disorder with anxious mood  Dementia   Patient with increased anxiety in the setting of dementia and she cannot tell us what is wrong. Yesterday was better but today patient is again besides herself with anxiety. She needs constant reassurance what she is ok.    - continues on sertraline.   - continue gabapentin that was added on 1/28 and titrated up.   - continue Seroquel to 6.25 mg po every 6 hours prn.     Pain  History of CVA (cerebrovascular accident)  Patient with history of CVA, R weakness and could certainly be in pain. Denies pain but unreliable due to dementia.   -added gabapentin to help with neuropathic pain control and may control some anxiety as well     Dysphagia, unspecified type  Ongoing and chronic. Diet upgraded today to pureed diet, nectar thick liquids.  Using Pets are family too to meet most " nutritional needs      CHF  Started on lasix 20 mg on 1/28 for increase in edema. Shortness of breath could be contributing to anxiety as well.  Wanted to check labs today before increasing lasix. But patient refused to those labs.  Suspect that shortness of breath and fluid overload it is contributing to anxiety.  Wt Readings from Last 4 Encounters:   02/04/22 69.1 kg (152 lb 4.8 oz)   02/03/22 69.5 kg (153 lb 3.2 oz)   02/01/22 68.7 kg (151 lb 6.4 oz)   01/28/22 67.5 kg (148 lb 12.8 oz)      -Increase Lasix to 40 mg p.o. every day  -icnrease potasium to 20 meq once daily    Orders:    Try to get labs again on Monday.  CMP, BMP, BNP diagnosis CHF    Increase Lasix to 40 mg per G-tube diagnosis CHF    Increase potassium chloride to 20 mEq p.o. per G-tube diagnosis CHF      Electronically signed by: Susie Bay NP         Sincerely,        Susie Bay NP

## 2022-02-04 NOTE — PROGRESS NOTES
[facility] GERIATRICS    Chief Complaint   Patient presents with     RECHECK     HPI: Jamia Coley is a 89 year old (2/28/1932), who is being seen today for an episodic care visit at: [facility] [[id number]].     Brief Summary of Hospital Course: Jamia Coley lives in assisted living facility at baseline and has a past medical history of CHF, afib, CAD, hypertension, dementia, depression and anxiety, glaucoma, macular degeneration, hypothyroidism, obesity, WATSON. S/he was admitted to the hospital with increased confusion, changes in speech and L facial droop and found to have new CVA in L Lateral clark and R occipital lobe. 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 The hospital stay was complicated with new finding of dysphagia and unable to tolerate oral intake so Gtube was placed.  She also developed thrush, and a rash of unknown origin.    Follow up needed:  -Neurology, [facility], Dr. Hartley, per routine  -consider Vascular follow up if failing medical management     Recent changes:  -1/19 - discontinue scheduled miralax, clarify nystatin S&S  -1/24 - discontinue benadryl, meclizine, prn quetiapine, vit B12 and decrease cetirizine to prn   -1/27 - MOCA 1/30, dependent for all ADLs and transfers  -1/28/2022 - add gabapentin and quetiapine for pain, anxiety, agitation control   2/1 MD visit  2/3 better anxiety today - orders: Change Seroquel to 6.25 mg p.o. every 6 hours as needed x14 days .   Check CBC, BMP, BNP tomorrow diagnosis edema    Chief Concerns Today:  More anxious today.  Nurse gave patient gabapentin about an hour ago and still quite anxious.  Patient reports that her breathing is a little more difficult.  Recommended to nurse that they give the as needed Seroquel.  Nurse reports that patient refused blood draw this morning.    ALLERGIES: Penicillins, Donepezil, Galantamine, and Rivastigmine  Past  "Medical, Surgical, Family and Social History reviewed and updated in EPIC.  Medication list and progress notes reviewed in nursing home electronic health record    ROS:  Unobtainable secondary to cognitive impairment.     Vitals:  /65   Pulse 76   Temp (!) 94  F (34.4  C)   Resp 22   Ht 1.676 m (5' 6\")   Wt 69.1 kg (152 lb 4.8 oz)   SpO2 94%   BMI 24.58 kg/m    Exam:  GENERAL APPEARANCE:  Alert, anxious  EYE EXAM: normal external eye, conjunctiva, lids, MIKE  CHEST/RESP:  respiratory effort normal, no respiratory distress, lung sounds diminished throughout. Pitting edema moderate of right upper extrem and 2+ pitting of bilateral lower extrem.   CV:  Rate reg, rhythm reg, no murmur, trace peripheral edema   M/S:   extremities abnormal-R UE weak, gait abnormal-unable to ambulate, muscle tone normal , digits and nails normal  NEURO: Right facial droop.   PSYCH:  at baseline mentation is confused/disoriented, now with increased agitation of unknown reason, restless and calling out, mood anxious     Assessment/Plan:  Adjustment disorder with anxious mood  Dementia   Patient with increased anxiety in the setting of dementia and she cannot tell us what is wrong. Yesterday was better but today patient is again besides herself with anxiety. She needs constant reassurance what she is ok.    - continues on sertraline.   - continue gabapentin that was added on 1/28 and titrated up.   - continue Seroquel to 6.25 mg po every 6 hours prn.     Pain  History of CVA (cerebrovascular accident)  Patient with history of CVA, R weakness and could certainly be in pain. Denies pain but unreliable due to dementia.   -added gabapentin to help with neuropathic pain control and may control some anxiety as well     Dysphagia, unspecified type  Ongoing and chronic. Diet upgraded today to pureed diet, nectar thick liquids.  Using Gtube to meet most nutritional needs      CHF  Started on lasix 20 mg on 1/28 for increase in edema. " Shortness of breath could be contributing to anxiety as well.  Wanted to check labs today before increasing lasix. But patient refused to those labs.  Suspect that shortness of breath and fluid overload it is contributing to anxiety.  Wt Readings from Last 4 Encounters:   02/04/22 69.1 kg (152 lb 4.8 oz)   02/03/22 69.5 kg (153 lb 3.2 oz)   02/01/22 68.7 kg (151 lb 6.4 oz)   01/28/22 67.5 kg (148 lb 12.8 oz)      -Increase Lasix to 40 mg p.o. every day  -icnrease potasium to 20 meq once daily    Orders:    Try to get labs again on Monday.  CMP, BMP, BNP diagnosis CHF    Increase Lasix to 40 mg per G-tube diagnosis CHF    Increase potassium chloride to 20 mEq p.o. per G-tube diagnosis CHF      Electronically signed by: Susie Bay NP

## 2022-02-06 RX ORDER — GABAPENTIN 100 MG/1
CAPSULE ORAL
Status: ON HOLD
Start: 2022-02-01 | End: 2022-02-25

## 2022-02-07 ENCOUNTER — TRANSITIONAL CARE UNIT VISIT (OUTPATIENT)
Dept: GERIATRICS | Facility: CLINIC | Age: 87
End: 2022-02-07
Payer: MEDICARE

## 2022-02-07 ENCOUNTER — LAB REQUISITION (OUTPATIENT)
Dept: LAB | Facility: CLINIC | Age: 87
End: 2022-02-07
Payer: MEDICARE

## 2022-02-07 VITALS
HEIGHT: 66 IN | DIASTOLIC BLOOD PRESSURE: 51 MMHG | TEMPERATURE: 97.5 F | WEIGHT: 156.4 LBS | SYSTOLIC BLOOD PRESSURE: 108 MMHG | RESPIRATION RATE: 16 BRPM | OXYGEN SATURATION: 94 % | HEART RATE: 62 BPM | BODY MASS INDEX: 25.13 KG/M2

## 2022-02-07 DIAGNOSIS — Z93.1 FEEDING BY G-TUBE (H): ICD-10-CM

## 2022-02-07 DIAGNOSIS — F43.22 ADJUSTMENT DISORDER WITH ANXIOUS MOOD: ICD-10-CM

## 2022-02-07 DIAGNOSIS — Z86.73 HISTORY OF CVA (CEREBROVASCULAR ACCIDENT): ICD-10-CM

## 2022-02-07 DIAGNOSIS — I50.9 CHRONIC CONGESTIVE HEART FAILURE, UNSPECIFIED HEART FAILURE TYPE (H): ICD-10-CM

## 2022-02-07 DIAGNOSIS — R13.10 DYSPHAGIA, UNSPECIFIED TYPE: ICD-10-CM

## 2022-02-07 DIAGNOSIS — I10 ESSENTIAL HYPERTENSION: ICD-10-CM

## 2022-02-07 DIAGNOSIS — F03.91 DEMENTIA WITH BEHAVIORAL DISTURBANCE, UNSPECIFIED DEMENTIA TYPE: Primary | ICD-10-CM

## 2022-02-07 DIAGNOSIS — I50.32 CHRONIC DIASTOLIC (CONGESTIVE) HEART FAILURE (H): ICD-10-CM

## 2022-02-07 LAB
ANION GAP SERPL CALCULATED.3IONS-SCNC: 3 MMOL/L (ref 3–14)
BUN SERPL-MCNC: 24 MG/DL (ref 7–30)
CALCIUM SERPL-MCNC: 8.3 MG/DL (ref 8.5–10.1)
CHLORIDE BLD-SCNC: 103 MMOL/L (ref 94–109)
CO2 SERPL-SCNC: 31 MMOL/L (ref 20–32)
CREAT SERPL-MCNC: 0.71 MG/DL (ref 0.52–1.04)
GFR SERPL CREATININE-BSD FRML MDRD: 81 ML/MIN/1.73M2
GLUCOSE BLD-MCNC: 79 MG/DL (ref 70–99)
NT-PROBNP SERPL-MCNC: 1732 PG/ML (ref 0–450)
POTASSIUM BLD-SCNC: 4.6 MMOL/L (ref 3.4–5.3)
SODIUM SERPL-SCNC: 137 MMOL/L (ref 133–144)

## 2022-02-07 PROCEDURE — P9603 ONE-WAY ALLOW PRORATED MILES: HCPCS | Performed by: FAMILY MEDICINE

## 2022-02-07 PROCEDURE — 99309 SBSQ NF CARE MODERATE MDM 30: CPT | Performed by: NURSE PRACTITIONER

## 2022-02-07 PROCEDURE — 82310 ASSAY OF CALCIUM: CPT | Performed by: FAMILY MEDICINE

## 2022-02-07 PROCEDURE — 36415 COLL VENOUS BLD VENIPUNCTURE: CPT | Performed by: FAMILY MEDICINE

## 2022-02-07 PROCEDURE — 83880 ASSAY OF NATRIURETIC PEPTIDE: CPT | Performed by: FAMILY MEDICINE

## 2022-02-07 ASSESSMENT — MIFFLIN-ST. JEOR: SCORE: 1151.18

## 2022-02-07 NOTE — PROGRESS NOTES
[facility] GERIATRICS    Chief Complaint   Patient presents with     RECHECK     HPI: Jamia Coley is a 89 year old (2/28/1932), who is being seen today for an episodic care visit at: [facility] ([facility]) [[id number]].     Chief Concerns Today:  Nursing staff notes that she has had ongoing weight gain, now up to 156 pounds, with some known edema.     Allergies, as well as Past Medical, Surgical, and Family History reviewed in Epic.    Medication list reviewed and reconciled.  Current Outpatient Medications:      acetaminophen (TYLENOL) 32 mg/mL liquid, Take 650 mg by mouth 4 times daily 650 mg = 20.3 ml , and 650 mg q6h prn , Disp: , Rfl:      alendronate (FOSAMAX) 70 MG tablet, Take 70 mg by mouth every 7 days On Sundays, Disp: , Rfl:      apixaban ANTICOAGULANT (ELIQUIS) 2.5 MG tablet, 2.5 mg by Per G Tube route 2 times daily, Disp: , Rfl:      brimonidine (ALPHAGAN) 0.2 % ophthalmic solution, Place 1 drop Into the left eye 2 times daily, Disp: , Rfl:      dorzolamide-timolol PF (COSOPT) 22.3-6.8 MG/ML opthalmic solution, Place 1 drop Into the left eye 2 times daily, Disp: , Rfl:      furosemide (LASIX) 20 MG tablet, 40 mg by Oral or Feeding Tube route daily, Disp: , Rfl:      gabapentin (NEURONTIN) 100 MG capsule, 200 mg tid scheduled 100 mg qid prn agitation, Disp: , Rfl:      latanoprost (XALATAN) 0.005 % ophthalmic solution, Place 1 drop Into the left eye At Bedtime, Disp: , Rfl:      levothyroxine (SYNTHROID) 150 MCG tablet, 150 mcg by Per G Tube route every evening, Disp: , Rfl:      lisinopril (ZESTRIL) 20 MG tablet, 20 mg by Per G Tube route daily, Disp: , Rfl:      multivitamin (OCUVITE) TABS tablet, Take 1 tablet by mouth daily, Disp: , Rfl:      potassium chloride (KAYCIEL) 20 MEQ/15ML (10%) solution, 15 mLs by Per G Tube route daily, Disp: , Rfl:      psyllium (METAMUCIL) 28 % packet, 1 packet by Per G Tube route 2 times daily, Disp: , Rfl:      QUEtiapine (SEROQUEL) 25 MG tablet, Take 6.25  "mg by mouth every 6 hours as needed Agitation and anxiety , Disp: , Rfl:      rosuvastatin (CRESTOR) 5 MG tablet, 5 mg by Per G Tube route daily, Disp: , Rfl:      sertraline (ZOLOFT) 20 MG/ML (HIGH CONC) solution, 50 mg by Per G Tube route daily 2.5 ml = 50 mg, Disp: , Rfl:      vitamin D3 (CHOLECALCIFEROL) 50 mcg (2000 units) tablet, Take 1 tablet by mouth daily, Disp: , Rfl:     ROS:  Limited secondary to cognitive impairment but today pt reports no new concerns, sits and often calls out for help with furrowed brow.     Vitals:  /51   Pulse 62   Temp 97.5  F (36.4  C)   Resp 16   Ht 1.676 m (5' 6\")   Wt 70.9 kg (156 lb 6.4 oz)   SpO2 94%   BMI 25.24 kg/m    Exam:  GENERAL APPEARANCE:  Alert, reports no distress   HEAD:  Normal, normocephalic, atraumatic  EYE EXAM: normal external eye, conjunctiva, lids, MIKE  CHEST/RESP:  respiratory effort normal, no respiratory distress, lung sounds CTA    CV:  Rate reg, rhythm reg, no murmur, 2+ peripheral edema pitting in R hand, bilateral LE  M/S:   extremities normal, gait abnormal-requires total assist of 1-2 for all transfers, muscle tone flaccid on the R, digits and nails normal   SKIN EXAM:  CDI   NEUROLOGIC EXAM: Cranial nerves 2-12 are grossly normal.  No tremor, gross motor movement at baseline.   PSYCH:  at baseline mentation, alert and oriented to self with confusion/forgetfulness, mood anxious much of the time.       Most Recent 3 CBC's:Recent Labs   Lab Test 01/28/22  0800 01/24/22  0525   WBC 6.6 8.8   HGB 13.2 13.4   * 102*    276     Most Recent 3 BMP's:Recent Labs   Lab Test 02/07/22  0530 01/28/22  0800 01/24/22  0525    137 138   POTASSIUM 4.6 4.0 3.8   CHLORIDE 103 101 102   CO2 31 32 32   BUN 24 19 23   CR 0.71 0.66 0.66   ANIONGAP 3 4 4   KYLE 8.3* 8.6 8.9   GLC 79 117* 129*     Most Recent 3 BNP's:Recent Labs   Lab Test 02/07/22  0530 01/28/22  0800   NTBNP 1,732* 2,041*      Assessment/Plan:  Dementia with behavioral " disturbance, unspecified dementia type (H)  Adjustment disorder with anxious mood  Patient with known history of dementia, with some ongoing anxiety. BIMS 5/15, PHQ9 2/27.  On sertraline, gabapentin, quetiapine, requiring prn gabapentin and quetiapine several times over last 7 days.   -increase sertraline for better anxiety control     History of CVA (cerebrovascular accident)  Dysphagia, unspecified type  Feeding by G-tube (H)  Patient continues to be fed primarily through GTube, but is also eating on average % of meals, and is receiving Jevity 1.5 at 80 ml/hr x 12 h per day. She has observed weight gain, certainly some edema but could also be due to excess calorie intake. Discussed plan of care with dietician, will decrease TF to 9 hours per day, continue to monitor oral and Gtube input.     Essential hypertension  CHF  Patient with history of hypertension, on lasix, K+, amlodipine, lisinopril.  BP and HR trending overall low normal range.  Noted ongoing bilateral LE and R arm edema, last Echo showing EF of 25-30% with global hypokinesis, last BNP continues to be elevated but slightly improved.    -discontinue amlodipine, likely of little benefit and may be contributing to edema.     Orders:    Discontinue cetirizine due to non-use    Increase sertraline to 100 mg per gtube daily     TF decreased today to 9 hours at 80 ml/hr    Discontinue amlodipine     Electronically signed by: JOSH Mancilla CNP

## 2022-02-07 NOTE — LETTER
2/7/2022        RE: Jamia Coley  [address] [facility] [id number] [place] [address] GERIATRICS    Chief Complaint   Patient presents with     RECHECK     HPI: Jamia Coley is a 89 year old (2/28/1932), who is being seen today for an episodic care visit at: [facility] ([facility]) [[id number]].     Chief Concerns Today:  Nursing staff notes that she has had ongoing weight gain, now up to 156 pounds, with some known edema.     Allergies, as well as Past Medical, Surgical, and Family History reviewed in Epic.    Medication list reviewed and reconciled.  Current Outpatient Medications:      acetaminophen (TYLENOL) 32 mg/mL liquid, Take 650 mg by mouth 4 times daily 650 mg = 20.3 ml , and 650 mg q6h prn , Disp: , Rfl:      alendronate (FOSAMAX) 70 MG tablet, Take 70 mg by mouth every 7 days On Sundays, Disp: , Rfl:      apixaban ANTICOAGULANT (ELIQUIS) 2.5 MG tablet, 2.5 mg by Per G Tube route 2 times daily, Disp: , Rfl:      brimonidine (ALPHAGAN) 0.2 % ophthalmic solution, Place 1 drop Into the left eye 2 times daily, Disp: , Rfl:      dorzolamide-timolol PF (COSOPT) 22.3-6.8 MG/ML opthalmic solution, Place 1 drop Into the left eye 2 times daily, Disp: , Rfl:      furosemide (LASIX) 20 MG tablet, 40 mg by Oral or Feeding Tube route daily, Disp: , Rfl:      gabapentin (NEURONTIN) 100 MG capsule, 200 mg tid scheduled 100 mg qid prn agitation, Disp: , Rfl:      latanoprost (XALATAN) 0.005 % ophthalmic solution, Place 1 drop Into the left eye At Bedtime, Disp: , Rfl:      levothyroxine (SYNTHROID) 150 MCG tablet, 150 mcg by Per G Tube route every evening, Disp: , Rfl:      lisinopril (ZESTRIL) 20 MG tablet, 20 mg by Per G Tube route daily, Disp: , Rfl:      multivitamin (OCUVITE) TABS tablet, Take 1 tablet by mouth daily, Disp: , Rfl:      potassium chloride (KAYCIEL) 20 MEQ/15ML (10%) solution, 15 mLs by Per G Tube route daily, Disp: , Rfl:      psyllium (METAMUCIL) 28 % packet, 1 packet by Per G Tube  "route 2 times daily, Disp: , Rfl:      QUEtiapine (SEROQUEL) 25 MG tablet, Take 6.25 mg by mouth every 6 hours as needed Agitation and anxiety , Disp: , Rfl:      rosuvastatin (CRESTOR) 5 MG tablet, 5 mg by Per G Tube route daily, Disp: , Rfl:      sertraline (ZOLOFT) 20 MG/ML (HIGH CONC) solution, 50 mg by Per G Tube route daily 2.5 ml = 50 mg, Disp: , Rfl:      vitamin D3 (CHOLECALCIFEROL) 50 mcg (2000 units) tablet, Take 1 tablet by mouth daily, Disp: , Rfl:     ROS:  Limited secondary to cognitive impairment but today pt reports no new concerns, sits and often calls out for help with furrowed brow.     Vitals:  /51   Pulse 62   Temp 97.5  F (36.4  C)   Resp 16   Ht 1.676 m (5' 6\")   Wt 70.9 kg (156 lb 6.4 oz)   SpO2 94%   BMI 25.24 kg/m    Exam:  GENERAL APPEARANCE:  Alert, reports no distress   HEAD:  Normal, normocephalic, atraumatic  EYE EXAM: normal external eye, conjunctiva, lids, MIKE  CHEST/RESP:  respiratory effort normal, no respiratory distress, lung sounds CTA    CV:  Rate reg, rhythm reg, no murmur, 2+ peripheral edema pitting in R hand, bilateral LE  M/S:   extremities normal, gait abnormal-requires total assist of 1-2 for all transfers, muscle tone flaccid on the R, digits and nails normal   SKIN EXAM:  CDI   NEUROLOGIC EXAM: Cranial nerves 2-12 are grossly normal.  No tremor, gross motor movement at baseline.   PSYCH:  at baseline mentation, alert and oriented to self with confusion/forgetfulness, mood anxious much of the time.       Most Recent 3 CBC's:Recent Labs   Lab Test 01/28/22  0800 01/24/22  0525   WBC 6.6 8.8   HGB 13.2 13.4   * 102*    276     Most Recent 3 BMP's:Recent Labs   Lab Test 02/07/22  0530 01/28/22  0800 01/24/22  0525    137 138   POTASSIUM 4.6 4.0 3.8   CHLORIDE 103 101 102   CO2 31 32 32   BUN 24 19 23   CR 0.71 0.66 0.66   ANIONGAP 3 4 4   KYLE 8.3* 8.6 8.9   GLC 79 117* 129*     Most Recent 3 BNP's:Recent Labs   Lab Test 02/07/22  0530 " 01/28/22  0800   [facility] 1,732* 2,041*      Assessment/Plan:  Dementia with behavioral disturbance, unspecified dementia type (H)  Adjustment disorder with anxious mood  Patient with known history of dementia, with some ongoing anxiety. BIMS 5/15, PHQ9 2/27.  On sertraline, gabapentin, quetiapine, requiring prn gabapentin and quetiapine several times over last 7 days.   -increase sertraline for better anxiety control     History of CVA (cerebrovascular accident)  Dysphagia, unspecified type  Feeding by G-tube (H)  Patient continues to be fed primarily through GTube, but is also eating on average % of meals, and is receiving Jevity 1.5 at 80 ml/hr x 12 h per day. She has observed weight gain, certainly some edema but could also be due to excess calorie intake. Discussed plan of care with dietician, will decrease TF to 9 hours per day, continue to monitor oral and Gtube input.     Essential hypertension  CHF  Patient with history of hypertension, on lasix, K+, amlodipine, lisinopril.  BP and HR trending overall low normal range.  Noted ongoing bilateral LE and R arm edema, last Echo showing EF of 25-30% with global hypokinesis, last BNP continues to be elevated but slightly improved.    -discontinue amlodipine, likely of little benefit and may be contributing to edema.     Orders:    Discontinue cetirizine due to non-use    Increase sertraline to 100 mg per gtube daily     TF decreased today to 9 hours at 80 ml/hr    Discontinue amlodipine     Electronically signed by: JOSH Mancilla CNP         Sincerely,        JOSH Mancilla CNP

## 2022-02-09 ENCOUNTER — TRANSITIONAL CARE UNIT VISIT (OUTPATIENT)
Dept: GERIATRICS | Facility: CLINIC | Age: 87
End: 2022-02-09
Payer: MEDICARE

## 2022-02-09 VITALS
WEIGHT: 163 LBS | HEART RATE: 70 BPM | BODY MASS INDEX: 26.2 KG/M2 | OXYGEN SATURATION: 93 % | SYSTOLIC BLOOD PRESSURE: 117 MMHG | TEMPERATURE: 97.9 F | DIASTOLIC BLOOD PRESSURE: 55 MMHG | RESPIRATION RATE: 18 BRPM | HEIGHT: 66 IN

## 2022-02-09 DIAGNOSIS — F03.91 DEMENTIA WITH BEHAVIORAL DISTURBANCE, UNSPECIFIED DEMENTIA TYPE: Primary | ICD-10-CM

## 2022-02-09 DIAGNOSIS — F43.22 ADJUSTMENT DISORDER WITH ANXIOUS MOOD: ICD-10-CM

## 2022-02-09 DIAGNOSIS — Z86.73 HISTORY OF CVA (CEREBROVASCULAR ACCIDENT): ICD-10-CM

## 2022-02-09 DIAGNOSIS — G81.91 RIGHT HEMIPARESIS (H): ICD-10-CM

## 2022-02-09 DIAGNOSIS — R60.0 EDEMA OF RIGHT UPPER ARM: ICD-10-CM

## 2022-02-09 PROCEDURE — 99309 SBSQ NF CARE MODERATE MDM 30: CPT | Performed by: NURSE PRACTITIONER

## 2022-02-09 ASSESSMENT — MIFFLIN-ST. JEOR: SCORE: 1181.11

## 2022-02-09 NOTE — LETTER
"    2/9/2022        RE: Jamia Coley  [address] [facility] [id number] [place] [address] GERIATRICS    Chief Complaint   Patient presents with     RECHECK     HPI: Jamia Coley is a 89 year old (2/28/1932), who is being seen today for an episodic care visit at: [facility] ([facility]) [[id number]].     Chief Concerns Today:  Jamia is observed to be chronically calling out for help, with pained and confused expression on her face, furrowed brow, moaning.  She consistently asks/comments:  \"Where am I?  When can I go home? Don't leave me alone.\" It is observed that this behavior stops with 1:1 staffing, when holding her hand.  She denies pain, but is observed to be restless much of the time when sitting up, constantly asking to change positions.  Upon review of nursing notes, she is often awake at night, yelling for help and anxious, spends much of her time in the dayroom where 1:1 support can be given.      Allergies, as well as Past Medical, Surgical, and Family History reviewed in Epic.    Medication list reviewed and reconciled.  Current Outpatient Medications:      acetaminophen (TYLENOL) 32 mg/mL liquid, Take 650 mg by mouth 4 times daily 650 mg = 20.3 ml , and 650 mg q6h prn , Disp: , Rfl:      alendronate (FOSAMAX) 70 MG tablet, Take 70 mg by mouth every 7 days On Sundays, Disp: , Rfl:      apixaban ANTICOAGULANT (ELIQUIS) 2.5 MG tablet, 2.5 mg by Per G Tube route 2 times daily, Disp: , Rfl:      brimonidine (ALPHAGAN) 0.2 % ophthalmic solution, Place 1 drop Into the left eye 2 times daily, Disp: , Rfl:      dorzolamide-timolol PF (COSOPT) 22.3-6.8 MG/ML opthalmic solution, Place 1 drop Into the left eye 2 times daily, Disp: , Rfl:      furosemide (LASIX) 20 MG tablet, 40 mg by Oral or Feeding Tube route daily, Disp: , Rfl:      gabapentin (NEURONTIN) 100 MG capsule, 200 mg tid scheduled 100 mg qid prn agitation, Disp: , Rfl:      latanoprost (XALATAN) 0.005 % ophthalmic solution, Place 1 " "drop Into the left eye At Bedtime, Disp: , Rfl:      levothyroxine (SYNTHROID) 150 MCG tablet, 150 mcg by Per G Tube route every evening, Disp: , Rfl:      lisinopril (ZESTRIL) 20 MG tablet, 20 mg by Per G Tube route daily, Disp: , Rfl:      multivitamin (OCUVITE) TABS tablet, Take 1 tablet by mouth daily, Disp: , Rfl:      potassium chloride (KAYCIEL) 20 MEQ/15ML (10%) solution, 15 mLs by Per G Tube route daily, Disp: , Rfl:      psyllium (METAMUCIL) 28 % packet, 1 packet by Per G Tube route 2 times daily, Disp: , Rfl:      QUEtiapine (SEROQUEL) 25 MG tablet, Take 6.25 mg by mouth every 6 hours as needed Agitation and anxiety , Disp: , Rfl:      rosuvastatin (CRESTOR) 5 MG tablet, 5 mg by Per G Tube route daily, Disp: , Rfl:      sertraline (ZOLOFT) 20 MG/ML (HIGH CONC) solution, 100 mg by Per G Tube route daily 5 ml = 100 mg , Disp: , Rfl:      vitamin D3 (CHOLECALCIFEROL) 50 mcg (2000 units) tablet, Take 1 tablet by mouth daily, Disp: , Rfl:     ROS:  4 point ROS including Respiratory, CV, GI and , other than that noted in the HPI,  is negative    Vitals:  /55   Pulse 70   Temp 97.9  F (36.6  C)   Resp 18   Ht 1.676 m (5' 6\")   Wt 73.9 kg (163 lb)   SpO2 93%   BMI 26.31 kg/m    Exam:  GENERAL APPEARANCE:  Alert, in obvious distress with furrowed brow, calling out and moaning  HEAD:  Normal, normocephalic, atraumatic  EYE EXAM: normal external eye, conjunctiva, lids, MIKE  CHEST/RESP:  respiratory effort normal, no respiratory distress, lung sounds CTA    CV:  Rate reg, rhythm reg, no murmur, trace peripheral edema bilateral LE, but noting significant 3+ non-pitting edema of R hand/arm   M/S:   extremities normal, gait abnormal-requires 2 assist to transfer, does not ambulate, muscle tone flaccid on R, digits and nails normal   NEUROLOGIC EXAM: Cranial nerves 2-12 are grossly normal.  No tremor, gross motor movement at baseline.   PSYCH:  at baseline mentation has advanced dementia, obvious anxiety " and agitation, mood depressed, MOCA 1/30, BIMS 5/15, PHQ9 2/27     Recent labs in [facility] reviewed by me today.        Assessment/Plan:  Dementia with behavioral disturbance, unspecified dementia type (H)  Adjustment disorder with anxious mood  History of CVA (cerebrovascular accident)  Patient with underlying advanced dementia S/P new CVA 1/1/22 with ongoing agitation and increased confusion.  Requiring 1:1 support much of every day.  She is obviously uncomfortable, with furrowed brow and moaning.  When questioned, she does not verbalize pain, she is on scheduled tylenol 650 mg QID.  Noting that behaviors do not change without tylenol, so suspect this is not pain but dementia with anxiety as cause of agitation and confusion.  She is on sertraline which was recently increased to 100 mg daily.  She is also on gabapentin 200 tid and 100 mg QID prn (recently increased) and quetiapine 6.25 mg q6h prn.  None of this is effective in providing her comfort and improving her ability to rest.  It is observed she is awake at night much of the time as well-anxious and calling out. Prn gabapentin used 9 times in last 7 days, about 50% effective.  Prn quetiapine used x4 in last 7 days and about 50% effective.    -add scheduled quetiapine 12.5 mg BID to current regimen, may need to increase to provide comfort without oversedation    Right hemiparesis (H)  Edema of right upper arm  Noting slightly increased edema of R hand, pulse present, no pain, no redness noted. Likely as a result of CVA.   -monitor, consider compression     Orders:    Quetiapine 12.5 mg po BID      Electronically signed by: JOSH Mancilla CNP         Sincerely,        JOSH Mancilla CNP

## 2022-02-09 NOTE — PROGRESS NOTES
"[facility] GERIATRICS    Chief Complaint   Patient presents with     RECHECK     HPI: Jamia Coley is a 89 year old (2/28/1932), who is being seen today for an episodic care visit at: [facility] ([facility]) [[id number]].     Chief Concerns Today:  Jamia is observed to be chronically calling out for help, with pained and confused expression on her face, furrowed brow, moaning.  She consistently asks/comments:  \"Where am I?  When can I go home? Don't leave me alone.\" It is observed that this behavior stops with 1:1 staffing, when holding her hand.  She denies pain, but is observed to be restless much of the time when sitting up, constantly asking to change positions.  Upon review of nursing notes, she is often awake at night, yelling for help and anxious, spends much of her time in the dayroom where 1:1 support can be given.      Allergies, as well as Past Medical, Surgical, and Family History reviewed in Epic.    Medication list reviewed and reconciled.  Current Outpatient Medications:      acetaminophen (TYLENOL) 32 mg/mL liquid, Take 650 mg by mouth 4 times daily 650 mg = 20.3 ml , and 650 mg q6h prn , Disp: , Rfl:      alendronate (FOSAMAX) 70 MG tablet, Take 70 mg by mouth every 7 days On Sundays, Disp: , Rfl:      apixaban ANTICOAGULANT (ELIQUIS) 2.5 MG tablet, 2.5 mg by Per G Tube route 2 times daily, Disp: , Rfl:      brimonidine (ALPHAGAN) 0.2 % ophthalmic solution, Place 1 drop Into the left eye 2 times daily, Disp: , Rfl:      dorzolamide-timolol PF (COSOPT) 22.3-6.8 MG/ML opthalmic solution, Place 1 drop Into the left eye 2 times daily, Disp: , Rfl:      furosemide (LASIX) 20 MG tablet, 40 mg by Oral or Feeding Tube route daily, Disp: , Rfl:      gabapentin (NEURONTIN) 100 MG capsule, 200 mg tid scheduled 100 mg qid prn agitation, Disp: , Rfl:      latanoprost (XALATAN) 0.005 % ophthalmic solution, Place 1 drop Into the left eye At Bedtime, Disp: , Rfl:      levothyroxine (SYNTHROID) 150 MCG " "tablet, 150 mcg by Per G Tube route every evening, Disp: , Rfl:      lisinopril (ZESTRIL) 20 MG tablet, 20 mg by Per G Tube route daily, Disp: , Rfl:      multivitamin (OCUVITE) TABS tablet, Take 1 tablet by mouth daily, Disp: , Rfl:      potassium chloride (KAYCIEL) 20 MEQ/15ML (10%) solution, 15 mLs by Per G Tube route daily, Disp: , Rfl:      psyllium (METAMUCIL) 28 % packet, 1 packet by Per G Tube route 2 times daily, Disp: , Rfl:      QUEtiapine (SEROQUEL) 25 MG tablet, Take 6.25 mg by mouth every 6 hours as needed Agitation and anxiety , Disp: , Rfl:      rosuvastatin (CRESTOR) 5 MG tablet, 5 mg by Per G Tube route daily, Disp: , Rfl:      sertraline (ZOLOFT) 20 MG/ML (HIGH CONC) solution, 100 mg by Per G Tube route daily 5 ml = 100 mg , Disp: , Rfl:      vitamin D3 (CHOLECALCIFEROL) 50 mcg (2000 units) tablet, Take 1 tablet by mouth daily, Disp: , Rfl:     ROS:  4 point ROS including Respiratory, CV, GI and , other than that noted in the HPI,  is negative    Vitals:  /55   Pulse 70   Temp 97.9  F (36.6  C)   Resp 18   Ht 1.676 m (5' 6\")   Wt 73.9 kg (163 lb)   SpO2 93%   BMI 26.31 kg/m    Exam:  GENERAL APPEARANCE:  Alert, in obvious distress with furrowed brow, calling out and moaning  HEAD:  Normal, normocephalic, atraumatic  EYE EXAM: normal external eye, conjunctiva, lids, MIKE  CHEST/RESP:  respiratory effort normal, no respiratory distress, lung sounds CTA    CV:  Rate reg, rhythm reg, no murmur, trace peripheral edema bilateral LE, but noting significant 3+ non-pitting edema of R hand/arm   M/S:   extremities normal, gait abnormal-requires 2 assist to transfer, does not ambulate, muscle tone flaccid on R, digits and nails normal   NEUROLOGIC EXAM: Cranial nerves 2-12 are grossly normal.  No tremor, gross motor movement at baseline.   PSYCH:  at baseline mentation has advanced dementia, obvious anxiety and agitation, mood depressed, MOCA 1/30, BIMS 5/15, PHQ9 2/27     Recent labs in [facility] " reviewed by me today.        Assessment/Plan:  Dementia with behavioral disturbance, unspecified dementia type (H)  Adjustment disorder with anxious mood  History of CVA (cerebrovascular accident)  Patient with underlying advanced dementia S/P new CVA 1/1/22 with ongoing agitation and increased confusion.  Requiring 1:1 support much of every day.  She is obviously uncomfortable, with furrowed brow and moaning.  When questioned, she does not verbalize pain, she is on scheduled tylenol 650 mg QID.  Noting that behaviors do not change without tylenol, so suspect this is not pain but dementia with anxiety as cause of agitation and confusion.  She is on sertraline which was recently increased to 100 mg daily.  She is also on gabapentin 200 tid and 100 mg QID prn (recently increased) and quetiapine 6.25 mg q6h prn.  None of this is effective in providing her comfort and improving her ability to rest.  It is observed she is awake at night much of the time as well-anxious and calling out. Prn gabapentin used 9 times in last 7 days, about 50% effective.  Prn quetiapine used x4 in last 7 days and about 50% effective.    -add scheduled quetiapine 12.5 mg BID to current regimen, may need to increase to provide comfort without oversedation    Right hemiparesis (H)  Edema of right upper arm  Noting slightly increased edema of R hand, pulse present, no pain, no redness noted. Likely as a result of CVA.   -monitor, consider compression     Orders:    Quetiapine 12.5 mg po BID      Electronically signed by: JOSH Mancilla CNP

## 2022-02-10 RX ORDER — QUETIAPINE FUMARATE 25 MG/1
12.5 TABLET, FILM COATED ORAL 2 TIMES DAILY
Status: ON HOLD | COMMUNITY
End: 2022-02-25

## 2022-02-16 ENCOUNTER — TRANSITIONAL CARE UNIT VISIT (OUTPATIENT)
Dept: GERIATRICS | Facility: CLINIC | Age: 87
End: 2022-02-16
Payer: MEDICARE

## 2022-02-16 VITALS
BODY MASS INDEX: 26.47 KG/M2 | DIASTOLIC BLOOD PRESSURE: 78 MMHG | TEMPERATURE: 98.2 F | RESPIRATION RATE: 20 BRPM | HEIGHT: 66 IN | HEART RATE: 72 BPM | WEIGHT: 164.7 LBS | SYSTOLIC BLOOD PRESSURE: 159 MMHG | OXYGEN SATURATION: 94 %

## 2022-02-16 DIAGNOSIS — R13.10 DYSPHAGIA, UNSPECIFIED TYPE: ICD-10-CM

## 2022-02-16 DIAGNOSIS — I50.9 CHRONIC CONGESTIVE HEART FAILURE, UNSPECIFIED HEART FAILURE TYPE (H): ICD-10-CM

## 2022-02-16 DIAGNOSIS — Z86.73 HISTORY OF CVA (CEREBROVASCULAR ACCIDENT): ICD-10-CM

## 2022-02-16 DIAGNOSIS — G81.91 RIGHT HEMIPARESIS (H): ICD-10-CM

## 2022-02-16 DIAGNOSIS — F41.9 ANXIETY: Primary | ICD-10-CM

## 2022-02-16 DIAGNOSIS — F03.91 DEMENTIA WITH BEHAVIORAL DISTURBANCE, UNSPECIFIED DEMENTIA TYPE: ICD-10-CM

## 2022-02-16 DIAGNOSIS — Z93.1 FEEDING BY G-TUBE (H): ICD-10-CM

## 2022-02-16 PROCEDURE — 99309 SBSQ NF CARE MODERATE MDM 30: CPT | Performed by: NURSE PRACTITIONER

## 2022-02-16 NOTE — PROGRESS NOTES
[facility] GERIATRICS    Chief Complaint   Patient presents with     RECHECK     HPI: Jamia Coley is a 89 year old (2/28/1932), who is being seen today for an episodic care visit at: [facility] ([facility]) [[id number]].     Brief Summary of Hospital Course: Jamia Coley lives in assisted living facility at baseline and has a past medical history of CHF, afib, CAD, hypertension, dementia, depression and anxiety, glaucoma, macular degeneration, hypothyroidism, obesity, WATSON. S/he was admitted to the hospital with increased confusion, changes in speech and L facial droop and found to have new CVA in L Lateral clark and R occipital lobe. 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 The hospital stay was complicated with new finding of dysphagia and unable to tolerate oral intake so Gtube was placed.  She also developed thrush, and a rash of unknown origin.    Follow up needed:  -Neurology, [facility], Dr. Hartley, per routine  -consider Vascular follow up if failing medical management     Recent changes:  -1/19 - discontinue scheduled miralax, clarify nystatin S&S  -1/24 - discontinue benadryl, meclizine, prn quetiapine, vit B12 and decrease cetirizine to prn   -1/27 - MOCA 1/30, dependent for all ADLs and transfers  -1/28/2022 - add gabapentin and quetiapine for pain, anxiety, agitation control   2/1 MD visit  2/3 better anxiety today - orders: Change Seroquel to 6.25 mg p.o. every 6 hours as needed x14 days .   Check CBC, BMP, BNP tomorrow diagnosis edema  2/4 increase in edema. Lasix increased to 40 mg and potassium increased to 20 meq.   2/7 discontinue cetrizine due to no use, increase sertraline to 100 mg daily, TF decreased. Amlodipine stopped.    Chief Concerns Today:  Continues with weight gain despite lasix and edema treatment with compression. Breathing ok. Denies shortness of breath.   Wt Readings from Last 4 Encounters:  "  02/16/22 74.7 kg (164 lb 11.2 oz)   02/09/22 73.9 kg (163 lb)   02/07/22 70.9 kg (156 lb 6.4 oz)   02/04/22 69.1 kg (152 lb 4.8 oz)   Nursing reports problems with G tube. Unable to verify placement today.    ALLERGIES: Penicillins, Donepezil, Galantamine, and Rivastigmine  Past Medical, Surgical, Family and Social History reviewed and updated in EPIC.  Medication list and progress notes reviewed in nursing home electronic health record    ROS:  Unobtainable secondary to cognitive impairment.     Vitals:  BP (!) 159/78   Pulse 72   Temp 98.2  F (36.8  C)   Resp 20   Ht 1.676 m (5' 6\")   Wt 74.7 kg (164 lb 11.2 oz)   SpO2 94%   BMI 26.58 kg/m    Exam:  GENERAL APPEARANCE:  Alert, sitting comfortably in recliner.   EYE EXAM: normal external eye, conjunctiva, lids, MIKE  CHEST/RESP:  respiratory effort normal, no respiratory distress, lung sounds diminished throughout. Pitting edema moderate of right upper extrem and 2+ pitting of bilateral lower extrem.   CV:  Rate reg, rhythm reg, no murmur,  M/S:   extremities abnormal-R UE weak, gait abnormal-unable to ambulate, muscle tone normal , digits and nails normal  NEURO: Right facial droop.   PSYCH:  at baseline mentation is confused/disoriented.     Assessment/Plan:  Anxiety   Dementia with behavioral disturbance, unspecified type   Patient with increased anxiety in the setting of dementia and she cannot tell us what is wrong. Sertraline was increased and continued on gabapentin. Also started on scheduled seroquel 12.5 mg po BID.  - no changes. Continue supportive care at the TCU.   -  following for discharge planning     History of CVA (cerebrovascular accident)  Right hemiparesis  Patient with history of CVA, R weakness and could certainly be in pain. Denies pain but unreliable due to dementia.   - continue gabapentin to help with neuropathic pain control and may control some anxiety as well     Dysphagia, unspecified type  Feeding by " G-tube  Ongoing and chronic. Diet pureed diet, nectar thick liquids. Nurse states today that patient g tube not working. Non urgent eval as patient is still able to eat and drink. Will have evaluation tomorrow in [facility].   Using Gtube to meet most nutritional needs.     CHF  Continues on lasix. Hasn't helped really at all. Amlodipine stopped. B/ps mostly controlled except for today's reading. With inability to use tube for feeding and fluids will hold off on increasing diuretics.  - monitor closely and follow up later this week.   Wt Readings from Last 4 Encounters:   02/16/22 74.7 kg (164 lb 11.2 oz)   02/09/22 73.9 kg (163 lb)   02/07/22 70.9 kg (156 lb 6.4 oz)   02/04/22 69.1 kg (152 lb 4.8 oz)   - no changes    Orders:    eval G-tube & possible replacement      Electronically signed by: Susie Bay NP

## 2022-02-16 NOTE — LETTER
2/16/2022        RE: Jamia Coley  [address] [facility] [address]        [facility] GERIATRICS    Chief Complaint   Patient presents with     RECHECK     HPI: Jamia Coley is a 89 year old (2/28/1932), who is being seen today for an episodic care visit at: [facility] ([facility]) [[id number]].     Brief Summary of Hospital Course: Jamia Coley lives in assisted living facility at baseline and has a past medical history of CHF, afib, CAD, hypertension, dementia, depression and anxiety, glaucoma, macular degeneration, hypothyroidism, obesity, WATSON. S/he was admitted to the hospital with increased confusion, changes in speech and L facial droop and found to have new CVA in L Lateral clark and R occipital lobe. 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 The hospital stay was complicated with new finding of dysphagia and unable to tolerate oral intake so Gtube was placed.  She also developed thrush, and a rash of unknown origin.    Follow up needed:  -Neurology, [facility], Dr. Hartley, per routine  -consider Vascular follow up if failing medical management     Recent changes:  -1/19 - discontinue scheduled miralax, clarify nystatin S&S  -1/24 - discontinue benadryl, meclizine, prn quetiapine, vit B12 and decrease cetirizine to prn   -1/27 - MOCA 1/30, dependent for all ADLs and transfers  -1/28/2022 - add gabapentin and quetiapine for pain, anxiety, agitation control   2/1 MD visit  2/3 better anxiety today - orders: Change Seroquel to 6.25 mg p.o. every 6 hours as needed x14 days .   Check CBC, BMP, BNP tomorrow diagnosis edema  2/4 increase in edema. Lasix increased to 40 mg and potassium increased to 20 meq.   2/7 discontinue cetrizine due to no use, increase sertraline to 100 mg daily, TF decreased. Amlodipine stopped.    Chief Concerns Today:  Continues with weight gain despite lasix and edema treatment with  "compression. Breathing ok. Denies shortness of breath.   Wt Readings from Last 4 Encounters:   02/16/22 74.7 kg (164 lb 11.2 oz)   02/09/22 73.9 kg (163 lb)   02/07/22 70.9 kg (156 lb 6.4 oz)   02/04/22 69.1 kg (152 lb 4.8 oz)   Nursing reports problems with G tube. Unable to verify placement today.    ALLERGIES: Penicillins, Donepezil, Galantamine, and Rivastigmine  Past Medical, Surgical, Family and Social History reviewed and updated in EPIC.  Medication list and progress notes reviewed in nursing home electronic health record    ROS:  Unobtainable secondary to cognitive impairment.     Vitals:  BP (!) 159/78   Pulse 72   Temp 98.2  F (36.8  C)   Resp 20   Ht 1.676 m (5' 6\")   Wt 74.7 kg (164 lb 11.2 oz)   SpO2 94%   BMI 26.58 kg/m    Exam:  GENERAL APPEARANCE:  Alert, sitting comfortably in recliner.   EYE EXAM: normal external eye, conjunctiva, lids, MIKE  CHEST/RESP:  respiratory effort normal, no respiratory distress, lung sounds diminished throughout. Pitting edema moderate of right upper extrem and 2+ pitting of bilateral lower extrem.   CV:  Rate reg, rhythm reg, no murmur,  M/S:   extremities abnormal-R UE weak, gait abnormal-unable to ambulate, muscle tone normal , digits and nails normal  NEURO: Right facial droop.   PSYCH:  at baseline mentation is confused/disoriented.     Assessment/Plan:  Anxiety   Dementia with behavioral disturbance, unspecified type   Patient with increased anxiety in the setting of dementia and she cannot tell us what is wrong. Sertraline was increased and continued on gabapentin. Also started on scheduled seroquel 12.5 mg po BID.  - no changes. Continue supportive care at the TCU.   -  following for discharge planning     History of CVA (cerebrovascular accident)  Right hemiparesis  Patient with history of CVA, R weakness and could certainly be in pain. Denies pain but unreliable due to dementia.   - continue gabapentin to help with neuropathic pain control " and may control some anxiety as well     Dysphagia, unspecified type  Feeding by G-tube  Ongoing and chronic. Diet pureed diet, nectar thick liquids. Nurse states today that patient g tube not working. Non urgent eval as patient is still able to eat and drink. Will have evaluation tomorrow in [place].   Using Gtube to meet most nutritional needs.     CHF  Continues on lasix. Hasn't helped really at all. Amlodipine stopped. B/ps mostly controlled except for today's reading. With inability to use tube for feeding and fluids will hold off on increasing diuretics.  - monitor closely and follow up later this week.   Wt Readings from Last 4 Encounters:   02/16/22 74.7 kg (164 lb 11.2 oz)   02/09/22 73.9 kg (163 lb)   02/07/22 70.9 kg (156 lb 6.4 oz)   02/04/22 69.1 kg (152 lb 4.8 oz)   - no changes    Orders:    eval G-tube & possible replacement      Electronically signed by: Susie Bay NP         Sincerely,        Susie Bay NP

## 2022-02-18 ENCOUNTER — ANESTHESIA EVENT (OUTPATIENT)
Dept: GASTROENTEROLOGY | Facility: CLINIC | Age: 87
DRG: 377 | End: 2022-02-18
Payer: MEDICARE

## 2022-02-18 ENCOUNTER — HOSPITAL ENCOUNTER (INPATIENT)
Facility: CLINIC | Age: 87
LOS: 10 days | Discharge: NURSING, REHAB OR LONG-TERM CARE | DRG: 377 | End: 2022-02-28
Attending: EMERGENCY MEDICINE | Admitting: INTERNAL MEDICINE
Payer: MEDICARE

## 2022-02-18 ENCOUNTER — HOSPITAL ENCOUNTER (OUTPATIENT)
Facility: CLINIC | Age: 87
DRG: 377 | End: 2022-02-18
Attending: SURGERY | Admitting: SURGERY
Payer: MEDICARE

## 2022-02-18 ENCOUNTER — APPOINTMENT (OUTPATIENT)
Dept: GENERAL RADIOLOGY | Facility: CLINIC | Age: 87
DRG: 377 | End: 2022-02-18
Attending: NURSE ANESTHETIST, CERTIFIED REGISTERED
Payer: MEDICARE

## 2022-02-18 ENCOUNTER — TRANSITIONAL CARE UNIT VISIT (OUTPATIENT)
Dept: GERIATRICS | Facility: CLINIC | Age: 87
End: 2022-02-18
Payer: MEDICARE

## 2022-02-18 ENCOUNTER — ANESTHESIA (OUTPATIENT)
Dept: GASTROENTEROLOGY | Facility: CLINIC | Age: 87
DRG: 377 | End: 2022-02-18
Payer: MEDICARE

## 2022-02-18 VITALS
OXYGEN SATURATION: 99 % | HEIGHT: 66 IN | HEART RATE: 86 BPM | RESPIRATION RATE: 20 BRPM | DIASTOLIC BLOOD PRESSURE: 86 MMHG | TEMPERATURE: 98.2 F | WEIGHT: 162.7 LBS | BODY MASS INDEX: 26.15 KG/M2 | SYSTOLIC BLOOD PRESSURE: 171 MMHG

## 2022-02-18 DIAGNOSIS — Z93.1 FEEDING BY G-TUBE (H): ICD-10-CM

## 2022-02-18 DIAGNOSIS — F03.91 DEMENTIA WITH BEHAVIORAL DISTURBANCE, UNSPECIFIED DEMENTIA TYPE: ICD-10-CM

## 2022-02-18 DIAGNOSIS — K92.2 GASTROINTESTINAL HEMORRHAGE, UNSPECIFIED GASTROINTESTINAL HEMORRHAGE TYPE: ICD-10-CM

## 2022-02-18 DIAGNOSIS — D64.9 ANEMIA, UNSPECIFIED TYPE: ICD-10-CM

## 2022-02-18 DIAGNOSIS — K92.2 UPPER GI BLEED: Primary | ICD-10-CM

## 2022-02-18 DIAGNOSIS — Z86.73 HISTORY OF CVA (CEREBROVASCULAR ACCIDENT): Chronic | ICD-10-CM

## 2022-02-18 DIAGNOSIS — Z79.01 CHRONIC ANTICOAGULATION: ICD-10-CM

## 2022-02-18 DIAGNOSIS — I50.9 CHRONIC CONGESTIVE HEART FAILURE, UNSPECIFIED HEART FAILURE TYPE (H): ICD-10-CM

## 2022-02-18 DIAGNOSIS — R13.10 DYSPHAGIA, UNSPECIFIED TYPE: ICD-10-CM

## 2022-02-18 DIAGNOSIS — I10 ESSENTIAL HYPERTENSION: Chronic | ICD-10-CM

## 2022-02-18 DIAGNOSIS — J69.0 ASPIRATION PNEUMONITIS (H): ICD-10-CM

## 2022-02-18 DIAGNOSIS — K92.2 GASTROINTESTINAL HEMORRHAGE, UNSPECIFIED GASTROINTESTINAL HEMORRHAGE TYPE: Primary | ICD-10-CM

## 2022-02-18 DIAGNOSIS — J18.9 PNEUMONIA OF LEFT LOWER LOBE DUE TO INFECTIOUS ORGANISM: ICD-10-CM

## 2022-02-18 DIAGNOSIS — I25.5 ISCHEMIC CARDIOMYOPATHY: Chronic | ICD-10-CM

## 2022-02-18 DIAGNOSIS — G81.91 RIGHT HEMIPARESIS (H): ICD-10-CM

## 2022-02-18 DIAGNOSIS — F41.9 ANXIETY: ICD-10-CM

## 2022-02-18 DIAGNOSIS — Z86.73 HISTORY OF CVA (CEREBROVASCULAR ACCIDENT): ICD-10-CM

## 2022-02-18 DIAGNOSIS — F41.1 GENERALIZED ANXIETY DISORDER: ICD-10-CM

## 2022-02-18 LAB
ABO/RH(D): ABNORMAL
ALBUMIN SERPL-MCNC: 3 G/DL (ref 3.4–5)
ALP SERPL-CCNC: 71 U/L (ref 40–150)
ALT SERPL W P-5'-P-CCNC: 32 U/L (ref 0–50)
ANION GAP SERPL CALCULATED.3IONS-SCNC: 5 MMOL/L (ref 3–14)
ANTIBODY SCREEN: POSITIVE
APTT PPP: 33 SECONDS (ref 22–38)
AST SERPL W P-5'-P-CCNC: 16 U/L (ref 0–45)
BASOPHILS # BLD AUTO: 0.1 10E3/UL (ref 0–0.2)
BASOPHILS NFR BLD AUTO: 1 %
BILIRUB SERPL-MCNC: 0.7 MG/DL (ref 0.2–1.3)
BUN SERPL-MCNC: 41 MG/DL (ref 7–30)
CALCIUM SERPL-MCNC: 8.9 MG/DL (ref 8.5–10.1)
CHLORIDE BLD-SCNC: 105 MMOL/L (ref 94–109)
CO2 SERPL-SCNC: 28 MMOL/L (ref 20–32)
CREAT SERPL-MCNC: 0.63 MG/DL (ref 0.52–1.04)
EOSINOPHIL # BLD AUTO: 0.2 10E3/UL (ref 0–0.7)
EOSINOPHIL NFR BLD AUTO: 2 %
ERYTHROCYTE [DISTWIDTH] IN BLOOD BY AUTOMATED COUNT: 15.8 % (ref 10–15)
FLUAV RNA SPEC QL NAA+PROBE: NEGATIVE
FLUBV RNA RESP QL NAA+PROBE: NEGATIVE
GFR SERPL CREATININE-BSD FRML MDRD: 84 ML/MIN/1.73M2
GLUCOSE BLD-MCNC: 107 MG/DL (ref 70–99)
GLUCOSE BLDC GLUCOMTR-MCNC: 106 MG/DL (ref 70–99)
HCT VFR BLD AUTO: 39.5 % (ref 35–47)
HGB BLD-MCNC: 10.9 G/DL (ref 11.7–15.7)
HGB BLD-MCNC: 12.5 G/DL (ref 11.7–15.7)
IMM GRANULOCYTES # BLD: 0 10E3/UL
IMM GRANULOCYTES NFR BLD: 0 %
INR PPP: 1.27 (ref 0.85–1.15)
LYMPHOCYTES # BLD AUTO: 3.3 10E3/UL (ref 0.8–5.3)
LYMPHOCYTES NFR BLD AUTO: 34 %
MCH RBC QN AUTO: 33.3 PG (ref 26.5–33)
MCHC RBC AUTO-ENTMCNC: 31.6 G/DL (ref 31.5–36.5)
MCV RBC AUTO: 105 FL (ref 78–100)
MONOCYTES # BLD AUTO: 0.8 10E3/UL (ref 0–1.3)
MONOCYTES NFR BLD AUTO: 8 %
NEUTROPHILS # BLD AUTO: 5.2 10E3/UL (ref 1.6–8.3)
NEUTROPHILS NFR BLD AUTO: 55 %
NRBC # BLD AUTO: 0 10E3/UL
NRBC BLD AUTO-RTO: 0 /100
PLATELET # BLD AUTO: 238 10E3/UL (ref 150–450)
POTASSIUM BLD-SCNC: 4.4 MMOL/L (ref 3.4–5.3)
PROT SERPL-MCNC: 6.6 G/DL (ref 6.8–8.8)
RBC # BLD AUTO: 3.75 10E6/UL (ref 3.8–5.2)
SARS-COV-2 RNA RESP QL NAA+PROBE: NEGATIVE
SODIUM SERPL-SCNC: 138 MMOL/L (ref 133–144)
SPECIMEN EXPIRATION DATE: ABNORMAL
UPPER GI ENDOSCOPY: NORMAL
WBC # BLD AUTO: 9.6 10E3/UL (ref 4–11)

## 2022-02-18 PROCEDURE — 85730 THROMBOPLASTIN TIME PARTIAL: CPT | Performed by: EMERGENCY MEDICINE

## 2022-02-18 PROCEDURE — 71045 X-RAY EXAM CHEST 1 VIEW: CPT

## 2022-02-18 PROCEDURE — 250N000011 HC RX IP 250 OP 636: Performed by: NURSE ANESTHETIST, CERTIFIED REGISTERED

## 2022-02-18 PROCEDURE — 86905 BLOOD TYPING RBC ANTIGENS: CPT | Performed by: EMERGENCY MEDICINE

## 2022-02-18 PROCEDURE — 43255 EGD CONTROL BLEEDING ANY: CPT | Performed by: SURGERY

## 2022-02-18 PROCEDURE — 86901 BLOOD TYPING SEROLOGIC RH(D): CPT | Performed by: EMERGENCY MEDICINE

## 2022-02-18 PROCEDURE — 86870 RBC ANTIBODY IDENTIFICATION: CPT | Performed by: EMERGENCY MEDICINE

## 2022-02-18 PROCEDURE — 86850 RBC ANTIBODY SCREEN: CPT | Performed by: EMERGENCY MEDICINE

## 2022-02-18 PROCEDURE — 85610 PROTHROMBIN TIME: CPT | Performed by: EMERGENCY MEDICINE

## 2022-02-18 PROCEDURE — 370N000017 HC ANESTHESIA TECHNICAL FEE, PER MIN: Performed by: SURGERY

## 2022-02-18 PROCEDURE — 250N000011 HC RX IP 250 OP 636: Performed by: INTERNAL MEDICINE

## 2022-02-18 PROCEDURE — 80053 COMPREHEN METABOLIC PANEL: CPT | Performed by: EMERGENCY MEDICINE

## 2022-02-18 PROCEDURE — 250N000013 HC RX MED GY IP 250 OP 250 PS 637: Performed by: INTERNAL MEDICINE

## 2022-02-18 PROCEDURE — 36415 COLL VENOUS BLD VENIPUNCTURE: CPT | Performed by: EMERGENCY MEDICINE

## 2022-02-18 PROCEDURE — 96372 THER/PROPH/DIAG INJ SC/IM: CPT | Performed by: EMERGENCY MEDICINE

## 2022-02-18 PROCEDURE — 0W3P8ZZ CONTROL BLEEDING IN GASTROINTESTINAL TRACT, VIA NATURAL OR ARTIFICIAL OPENING ENDOSCOPIC: ICD-10-PCS | Performed by: SURGERY

## 2022-02-18 PROCEDURE — 99223 1ST HOSP IP/OBS HIGH 75: CPT | Mod: AI | Performed by: INTERNAL MEDICINE

## 2022-02-18 PROCEDURE — 99285 EMERGENCY DEPT VISIT HI MDM: CPT | Mod: 25

## 2022-02-18 PROCEDURE — 86906 BLD TYPING SEROLOGIC RH PHNT: CPT | Performed by: EMERGENCY MEDICINE

## 2022-02-18 PROCEDURE — 99309 SBSQ NF CARE MODERATE MDM 30: CPT | Performed by: NURSE PRACTITIONER

## 2022-02-18 PROCEDURE — 93005 ELECTROCARDIOGRAM TRACING: CPT

## 2022-02-18 PROCEDURE — 258N000003 HC RX IP 258 OP 636: Performed by: INTERNAL MEDICINE

## 2022-02-18 PROCEDURE — 84999 UNLISTED CHEMISTRY PROCEDURE: CPT | Performed by: EMERGENCY MEDICINE

## 2022-02-18 PROCEDURE — 93010 ELECTROCARDIOGRAM REPORT: CPT | Performed by: EMERGENCY MEDICINE

## 2022-02-18 PROCEDURE — 250N000011 HC RX IP 250 OP 636: Performed by: SURGERY

## 2022-02-18 PROCEDURE — 250N000011 HC RX IP 250 OP 636: Performed by: EMERGENCY MEDICINE

## 2022-02-18 PROCEDURE — 86880 COOMBS TEST DIRECT: CPT | Performed by: EMERGENCY MEDICINE

## 2022-02-18 PROCEDURE — 250N000009 HC RX 250: Performed by: INTERNAL MEDICINE

## 2022-02-18 PROCEDURE — 250N000011 HC RX IP 250 OP 636

## 2022-02-18 PROCEDURE — 87636 SARSCOV2 & INF A&B AMP PRB: CPT | Performed by: EMERGENCY MEDICINE

## 2022-02-18 PROCEDURE — C9113 INJ PANTOPRAZOLE SODIUM, VIA: HCPCS | Performed by: INTERNAL MEDICINE

## 2022-02-18 PROCEDURE — C9113 INJ PANTOPRAZOLE SODIUM, VIA: HCPCS | Performed by: EMERGENCY MEDICINE

## 2022-02-18 PROCEDURE — 85025 COMPLETE CBC W/AUTO DIFF WBC: CPT | Performed by: EMERGENCY MEDICINE

## 2022-02-18 PROCEDURE — 99285 EMERGENCY DEPT VISIT HI MDM: CPT | Mod: 25 | Performed by: EMERGENCY MEDICINE

## 2022-02-18 PROCEDURE — 96374 THER/PROPH/DIAG INJ IV PUSH: CPT

## 2022-02-18 PROCEDURE — 86900 BLOOD TYPING SEROLOGIC ABO: CPT | Performed by: EMERGENCY MEDICINE

## 2022-02-18 PROCEDURE — 258N000003 HC RX IP 258 OP 636: Performed by: NURSE ANESTHETIST, CERTIFIED REGISTERED

## 2022-02-18 PROCEDURE — 250N000009 HC RX 250: Performed by: NURSE ANESTHETIST, CERTIFIED REGISTERED

## 2022-02-18 PROCEDURE — 200N000001 HC R&B ICU

## 2022-02-18 PROCEDURE — 85018 HEMOGLOBIN: CPT | Performed by: EMERGENCY MEDICINE

## 2022-02-18 PROCEDURE — 96375 TX/PRO/DX INJ NEW DRUG ADDON: CPT

## 2022-02-18 RX ORDER — NALOXONE HYDROCHLORIDE 0.4 MG/ML
0.4 INJECTION, SOLUTION INTRAMUSCULAR; INTRAVENOUS; SUBCUTANEOUS
Status: DISCONTINUED | OUTPATIENT
Start: 2022-02-18 | End: 2022-02-28 | Stop reason: HOSPADM

## 2022-02-18 RX ORDER — NALOXONE HYDROCHLORIDE 0.4 MG/ML
0.2 INJECTION, SOLUTION INTRAMUSCULAR; INTRAVENOUS; SUBCUTANEOUS
Status: DISCONTINUED | OUTPATIENT
Start: 2022-02-18 | End: 2022-02-28 | Stop reason: HOSPADM

## 2022-02-18 RX ORDER — NICOTINE POLACRILEX 4 MG
15-30 LOZENGE BUCCAL
Status: DISCONTINUED | OUTPATIENT
Start: 2022-02-18 | End: 2022-02-28 | Stop reason: HOSPADM

## 2022-02-18 RX ORDER — DIMENHYDRINATE 50 MG/ML
25 INJECTION, SOLUTION INTRAMUSCULAR; INTRAVENOUS EVERY 6 HOURS PRN
Status: DISCONTINUED | OUTPATIENT
Start: 2022-02-18 | End: 2022-02-18

## 2022-02-18 RX ORDER — HYDROMORPHONE HCL IN WATER/PF 6 MG/30 ML
0.2 PATIENT CONTROLLED ANALGESIA SYRINGE INTRAVENOUS EVERY 5 MIN PRN
Status: DISCONTINUED | OUTPATIENT
Start: 2022-02-18 | End: 2022-02-18 | Stop reason: HOSPADM

## 2022-02-18 RX ORDER — LATANOPROST 50 UG/ML
1 SOLUTION/ DROPS OPHTHALMIC AT BEDTIME
Status: DISCONTINUED | OUTPATIENT
Start: 2022-02-18 | End: 2022-02-28 | Stop reason: HOSPADM

## 2022-02-18 RX ORDER — ONDANSETRON 4 MG/1
4 TABLET, ORALLY DISINTEGRATING ORAL EVERY 30 MIN PRN
Status: COMPLETED | OUTPATIENT
Start: 2022-02-18 | End: 2022-02-18

## 2022-02-18 RX ORDER — SODIUM CHLORIDE 9 MG/ML
INJECTION, SOLUTION INTRAVENOUS CONTINUOUS PRN
Status: DISCONTINUED | OUTPATIENT
Start: 2022-02-18 | End: 2022-02-18

## 2022-02-18 RX ORDER — ONDANSETRON 4 MG/1
4 TABLET, ORALLY DISINTEGRATING ORAL EVERY 6 HOURS PRN
Status: DISCONTINUED | OUTPATIENT
Start: 2022-02-18 | End: 2022-02-28 | Stop reason: HOSPADM

## 2022-02-18 RX ORDER — LIDOCAINE 40 MG/G
CREAM TOPICAL
Status: DISCONTINUED | OUTPATIENT
Start: 2022-02-18 | End: 2022-02-18 | Stop reason: HOSPADM

## 2022-02-18 RX ORDER — HALOPERIDOL 5 MG/ML
5 INJECTION INTRAMUSCULAR EVERY 6 HOURS PRN
Status: DISCONTINUED | OUTPATIENT
Start: 2022-02-18 | End: 2022-02-22

## 2022-02-18 RX ORDER — DEXTROSE MONOHYDRATE 25 G/50ML
25-50 INJECTION, SOLUTION INTRAVENOUS
Status: DISCONTINUED | OUTPATIENT
Start: 2022-02-18 | End: 2022-02-28 | Stop reason: HOSPADM

## 2022-02-18 RX ORDER — SERTRALINE HYDROCHLORIDE 20 MG/ML
100 SOLUTION ORAL DAILY
Status: DISCONTINUED | OUTPATIENT
Start: 2022-02-18 | End: 2022-02-28 | Stop reason: HOSPADM

## 2022-02-18 RX ORDER — SUCRALFATE ORAL 1 G/10ML
1 SUSPENSION ORAL
Status: DISCONTINUED | OUTPATIENT
Start: 2022-02-18 | End: 2022-02-18

## 2022-02-18 RX ORDER — ONDANSETRON 2 MG/ML
4 INJECTION INTRAMUSCULAR; INTRAVENOUS EVERY 6 HOURS PRN
Status: DISCONTINUED | OUTPATIENT
Start: 2022-02-18 | End: 2022-02-28 | Stop reason: HOSPADM

## 2022-02-18 RX ORDER — ROSUVASTATIN CALCIUM 5 MG/1
5 TABLET, COATED ORAL DAILY
Status: DISCONTINUED | OUTPATIENT
Start: 2022-02-18 | End: 2022-02-26

## 2022-02-18 RX ORDER — HYDRALAZINE HYDROCHLORIDE 20 MG/ML
10 INJECTION INTRAMUSCULAR; INTRAVENOUS EVERY 4 HOURS PRN
Status: DISCONTINUED | OUTPATIENT
Start: 2022-02-18 | End: 2022-02-28 | Stop reason: HOSPADM

## 2022-02-18 RX ORDER — DEXAMETHASONE SODIUM PHOSPHATE 4 MG/ML
8 INJECTION, SOLUTION INTRA-ARTICULAR; INTRALESIONAL; INTRAMUSCULAR; INTRAVENOUS; SOFT TISSUE ONCE
Status: COMPLETED | OUTPATIENT
Start: 2022-02-18 | End: 2022-02-18

## 2022-02-18 RX ORDER — PROPOFOL 10 MG/ML
INJECTION, EMULSION INTRAVENOUS PRN
Status: DISCONTINUED | OUTPATIENT
Start: 2022-02-18 | End: 2022-02-18

## 2022-02-18 RX ORDER — BRIMONIDINE TARTRATE 2 MG/ML
1 SOLUTION/ DROPS OPHTHALMIC 2 TIMES DAILY
Status: DISCONTINUED | OUTPATIENT
Start: 2022-02-18 | End: 2022-02-28 | Stop reason: HOSPADM

## 2022-02-18 RX ORDER — OXYCODONE HYDROCHLORIDE 5 MG/1
5 TABLET ORAL EVERY 4 HOURS PRN
Status: DISCONTINUED | OUTPATIENT
Start: 2022-02-18 | End: 2022-02-18

## 2022-02-18 RX ORDER — SODIUM CHLORIDE 9 MG/ML
INJECTION, SOLUTION INTRAVENOUS CONTINUOUS
Status: DISCONTINUED | OUTPATIENT
Start: 2022-02-18 | End: 2022-02-19

## 2022-02-18 RX ORDER — ONDANSETRON 2 MG/ML
4 INJECTION INTRAMUSCULAR; INTRAVENOUS ONCE
Status: DISCONTINUED | OUTPATIENT
Start: 2022-02-18 | End: 2022-02-18

## 2022-02-18 RX ORDER — GABAPENTIN 100 MG/1
200 CAPSULE ORAL 3 TIMES DAILY
Status: DISCONTINUED | OUTPATIENT
Start: 2022-02-18 | End: 2022-02-22

## 2022-02-18 RX ORDER — METOPROLOL TARTRATE 1 MG/ML
5 INJECTION, SOLUTION INTRAVENOUS EVERY 5 MIN PRN
Status: DISCONTINUED | OUTPATIENT
Start: 2022-02-18 | End: 2022-02-28 | Stop reason: HOSPADM

## 2022-02-18 RX ORDER — SODIUM CHLORIDE, SODIUM LACTATE, POTASSIUM CHLORIDE, CALCIUM CHLORIDE 600; 310; 30; 20 MG/100ML; MG/100ML; MG/100ML; MG/100ML
INJECTION, SOLUTION INTRAVENOUS CONTINUOUS
Status: DISCONTINUED | OUTPATIENT
Start: 2022-02-18 | End: 2022-02-18 | Stop reason: HOSPADM

## 2022-02-18 RX ORDER — ONDANSETRON 2 MG/ML
INJECTION INTRAMUSCULAR; INTRAVENOUS
Status: COMPLETED
Start: 2022-02-18 | End: 2022-02-18

## 2022-02-18 RX ORDER — LISINOPRIL 20 MG/1
20 TABLET ORAL DAILY
Status: DISCONTINUED | OUTPATIENT
Start: 2022-02-18 | End: 2022-02-28 | Stop reason: HOSPADM

## 2022-02-18 RX ORDER — OLANZAPINE 10 MG/2ML
5 INJECTION, POWDER, FOR SOLUTION INTRAMUSCULAR DAILY PRN
Status: DISCONTINUED | OUTPATIENT
Start: 2022-02-18 | End: 2022-02-18

## 2022-02-18 RX ORDER — LABETALOL HYDROCHLORIDE 5 MG/ML
10 INJECTION, SOLUTION INTRAVENOUS
Status: DISCONTINUED | OUTPATIENT
Start: 2022-02-18 | End: 2022-02-28 | Stop reason: HOSPADM

## 2022-02-18 RX ORDER — GABAPENTIN 100 MG/1
200 CAPSULE ORAL 3 TIMES DAILY
Status: DISCONTINUED | OUTPATIENT
Start: 2022-02-18 | End: 2022-02-18

## 2022-02-18 RX ORDER — ONDANSETRON 2 MG/ML
4 INJECTION INTRAMUSCULAR; INTRAVENOUS EVERY 30 MIN PRN
Status: COMPLETED | OUTPATIENT
Start: 2022-02-18 | End: 2022-02-18

## 2022-02-18 RX ORDER — SUCRALFATE ORAL 1 G/10ML
1 SUSPENSION ORAL
Status: DISCONTINUED | OUTPATIENT
Start: 2022-02-18 | End: 2022-02-28 | Stop reason: HOSPADM

## 2022-02-18 RX ORDER — DORZOLAMIDE HYDROCHLORIDE AND TIMOLOL MALEATE 20; 5 MG/ML; MG/ML
1 SOLUTION/ DROPS OPHTHALMIC 2 TIMES DAILY
Status: DISCONTINUED | OUTPATIENT
Start: 2022-02-18 | End: 2022-02-28 | Stop reason: HOSPADM

## 2022-02-18 RX ORDER — EPINEPHRINE IN SOD CHLOR,ISO 1 MG/10 ML
SYRINGE (ML) INTRAVENOUS PRN
Status: DISCONTINUED | OUTPATIENT
Start: 2022-02-18 | End: 2022-02-18 | Stop reason: HOSPADM

## 2022-02-18 RX ORDER — LEVOTHYROXINE SODIUM 150 UG/1
150 TABLET ORAL EVERY EVENING
Status: DISCONTINUED | OUTPATIENT
Start: 2022-02-18 | End: 2022-02-28 | Stop reason: HOSPADM

## 2022-02-18 RX ADMIN — METOPROLOL TARTRATE 5 MG: 1 INJECTION, SOLUTION INTRAVENOUS at 17:48

## 2022-02-18 RX ADMIN — METOPROLOL TARTRATE 5 MG: 1 INJECTION, SOLUTION INTRAVENOUS at 17:44

## 2022-02-18 RX ADMIN — GABAPENTIN 200 MG: 100 CAPSULE ORAL at 22:55

## 2022-02-18 RX ADMIN — SERTRALINE HYDROCHLORIDE 100 MG: 20 SOLUTION ORAL at 22:58

## 2022-02-18 RX ADMIN — DORZOLAMIDE HYDROCHLORIDE AND TIMOLOL MALEATE 1 DROP: 20; 5 SOLUTION OPHTHALMIC at 22:53

## 2022-02-18 RX ADMIN — BRIMONIDINE TARTRATE 1 DROP: 2 SOLUTION/ DROPS OPHTHALMIC at 22:41

## 2022-02-18 RX ADMIN — MIDAZOLAM 2 MG: 1 INJECTION INTRAMUSCULAR; INTRAVENOUS at 12:55

## 2022-02-18 RX ADMIN — PROPOFOL 30 MG: 10 INJECTION, EMULSION INTRAVENOUS at 16:50

## 2022-02-18 RX ADMIN — QUETIAPINE 12.5 MG: 25 TABLET ORAL at 22:57

## 2022-02-18 RX ADMIN — ROSUVASTATIN CALCIUM 5 MG: 5 TABLET, FILM COATED ORAL at 22:57

## 2022-02-18 RX ADMIN — SUCRALFATE 1 G: 1 SUSPENSION ORAL at 21:35

## 2022-02-18 RX ADMIN — LATANOPROST 1 DROP: 50 SOLUTION OPHTHALMIC at 22:56

## 2022-02-18 RX ADMIN — LISINOPRIL 20 MG: 20 TABLET ORAL at 22:57

## 2022-02-18 RX ADMIN — METOPROLOL TARTRATE 5 MG: 1 INJECTION, SOLUTION INTRAVENOUS at 18:16

## 2022-02-18 RX ADMIN — MIDAZOLAM 2 MG: 1 INJECTION INTRAMUSCULAR; INTRAVENOUS at 16:45

## 2022-02-18 RX ADMIN — PANTOPRAZOLE SODIUM 80 MG: 40 INJECTION, POWDER, FOR SOLUTION INTRAVENOUS at 12:12

## 2022-02-18 RX ADMIN — SODIUM CHLORIDE: 0.9 INJECTION, SOLUTION INTRAVENOUS at 16:45

## 2022-02-18 RX ADMIN — LEVOTHYROXINE SODIUM 150 MCG: 0.15 TABLET ORAL at 22:56

## 2022-02-18 RX ADMIN — ONDANSETRON 4 MG: 4 TABLET, ORALLY DISINTEGRATING ORAL at 17:19

## 2022-02-18 RX ADMIN — ONDANSETRON 4 MG: 2 INJECTION INTRAMUSCULAR; INTRAVENOUS at 16:09

## 2022-02-18 RX ADMIN — PROPOFOL 30 MG: 10 INJECTION, EMULSION INTRAVENOUS at 16:57

## 2022-02-18 RX ADMIN — DEXAMETHASONE SODIUM PHOSPHATE 8 MG: 4 INJECTION, SOLUTION INTRAMUSCULAR; INTRAVENOUS at 17:44

## 2022-02-18 RX ADMIN — PANTOPRAZOLE SODIUM 40 MG: 40 INJECTION, POWDER, FOR SOLUTION INTRAVENOUS at 21:35

## 2022-02-18 RX ADMIN — OLANZAPINE 5 MG: 10 INJECTION, POWDER, FOR SOLUTION INTRAMUSCULAR at 11:42

## 2022-02-18 RX ADMIN — SODIUM CHLORIDE: 9 INJECTION, SOLUTION INTRAVENOUS at 21:36

## 2022-02-18 ASSESSMENT — ACTIVITIES OF DAILY LIVING (ADL)
ADLS_ACUITY_SCORE: 21

## 2022-02-18 ASSESSMENT — ENCOUNTER SYMPTOMS: DYSRHYTHMIAS: 1

## 2022-02-18 NOTE — CONSULTS
89 year old year old female here for upper endoscopy for bleeding.  Patient has a history of CHF, embolic stroke on eliquis who presents with upper GI bleeding.  Patient had gastrostomy tube replacement yesterday as it was plugged.  Per daughter the tube was upsized.  This was performed in [facility].  Records are not available for review.  She had her Eliquis held that day and was resumed.  This morning patient had melena and coffee ground output from her gastrostomy site.  Per daughter patient has not had a upper GI bleed in the past.      Patient Active Problem List   Diagnosis     Syncope     Benign paroxysmal positional vertigo     Cataract     Coronary atherosclerosis     COVID-19     Contact dermatitis and other eczema due to other specified agent     Constipation     TIA (transient ischemic attack)     Depression     Essential hypertension     Osteoporosis     Obesity, unspecified     Obstructive sleep apnea (adult) (pediatric)     Macular degeneration     Long term current use of anticoagulant     Inadequate vitamin B12 intake     Hypothyroidism     Hyperlipidemia     Glaucoma     Generalized anxiety disorder     Paroxysmal atrial fibrillation (H)     Sensorineural hearing loss, bilateral     Toxic diffuse goiter     Past Medical History:   Diagnosis Date     ACS (acute coronary syndrome) (H) 10/1/2012     Anxiety      Cataract      Dementia (H) 1/10/2022     History of syncope 8/5/2016     Hypertension      Thyroid disease      Transient ischemic attack (TIA), and cerebral infarction without residual deficits(V12.54) 11/30/2010     Past Surgical History:   Procedure Laterality Date     GYN SURGERY       No family history on file.    Current Outpatient Rx   Medication Sig Dispense Refill     acetaminophen (TYLENOL) 32 mg/mL liquid Take 650 mg by mouth 4 times daily 650 mg = 20.3 ml , and 650 mg q6h prn        alendronate (FOSAMAX) 70 MG tablet Take 70 mg by mouth every 7 days On Sundays       apixaban  ANTICOAGULANT (ELIQUIS) 2.5 MG tablet 2.5 mg by Per G Tube route 2 times daily       brimonidine (ALPHAGAN) 0.2 % ophthalmic solution Place 1 drop Into the left eye 2 times daily       dorzolamide-timolol PF (COSOPT) 22.3-6.8 MG/ML opthalmic solution Place 1 drop Into the left eye 2 times daily       furosemide (LASIX) 20 MG tablet 40 mg by Oral or Feeding Tube route daily       gabapentin (NEURONTIN) 100 MG capsule 200 mg tid scheduled  100 mg qid prn agitation       latanoprost (XALATAN) 0.005 % ophthalmic solution Place 1 drop Into the left eye At Bedtime       levothyroxine (SYNTHROID) 150 MCG tablet 150 mcg by Per G Tube route every evening       lisinopril (ZESTRIL) 20 MG tablet 20 mg by Per G Tube route daily       multivitamin (OCUVITE) TABS tablet Take 1 tablet by mouth daily       potassium chloride (KAYCIEL) 20 MEQ/15ML (10%) solution 15 mLs by Per G Tube route daily       psyllium (METAMUCIL) 28 % packet 1 packet by Per G Tube route 2 times daily       QUEtiapine (SEROQUEL) 25 MG tablet Take 12.5 mg by mouth 2 times daily       QUEtiapine (SEROQUEL) 25 MG tablet Take 6.25 mg by mouth every 6 hours as needed Agitation and anxiety        rosuvastatin (CRESTOR) 5 MG tablet 5 mg by Per G Tube route daily       sertraline (ZOLOFT) 20 MG/ML (HIGH CONC) solution 100 mg by Per G Tube route daily 5 ml = 100 mg        vitamin D3 (CHOLECALCIFEROL) 50 mcg (2000 units) tablet Take 1 tablet by mouth daily       Allergies   Allergen Reactions     Penicillins Anaphylaxis, Itching and Rash     Donepezil Unknown     Galantamine Other (See Comments)     Bradycardia     Rivastigmine Dizziness     Pt reports that she does not drink alcohol.    Exam:    BP (!) 120/100   Pulse 87   Temp 98  F (36.7  C) (Oral)   Resp 10   SpO2 94%     Awake, Alert, Anxious, sitting up at bedside  Lungs - Good inspiratory effort, no respiratory distress  CV - RRR, no murmurs, distal pulses intact  Abd - soft, non-distended, non-tender, +BS,  gastrostomy with coffee ground fluid in tube in left upper quadrant  Extr - No cyanosis or edema    A/P 89 year old year old female in need of upper endoscopy for GI bleed.  Had extensive discussion with Daughter, Víctor, who is DPOA regarding options.  Discussed she is higher risk given comorbid conditions and age.  Discussed watchful waiting vs upper endoscopy.  She wishes to proceed with upper endoscopy. Risks, benefits, alternatives, and complications were discussed including the possibility of perforation, aspiration, non-therapeutic procedure, stroke, MI, airway compromise and the daughter agreed to proceed.  Verbal consent obtained via phone, Witnessed by Amrit Smalls RN.    Star Martinez DO on 2/18/2022 at 4:37 PM

## 2022-02-18 NOTE — LETTER
Transition Communication Hand-off for Care Transitions to Next Level of Care Provider    Name: Jamia Coley  : 1932  MRN #: [id number]  Primary Care Provider: Britni Bhagat     Primary Clinic: [facility] [address] , [place] [address]     Reason for Hospitalization:  Upper GI bleed [K92.2]  Gastrointestinal hemorrhage, unspecified gastrointestinal hemorrhage type [K92.2]  Admit Date/Time: 2022 11:04 AM  Discharge Date: 22  Payor Source: Payor: MEDICARE / Plan: MEDICARE / Product Type: Medicare /     Readmission Assessment Measure (LETY) Risk Score/category:  HIGH         Reason for Communication Hand-off Referral: Fragility    Discharge Plan:  Long term care with hospice cares   Concern for non-adherence with plan of care:   Y/N  NO    Key Recommendations:   Admit to [facility] (Phone: [phone] Fax: [phone]) and enroll with [facility] (phone [phone]/fax: [phone]).    RICARDO Calderon    AVS/Discharge Summary is the source of truth; this is a helpful guide for improved communication of patient story

## 2022-02-18 NOTE — ANESTHESIA CARE TRANSFER NOTE
Patient: Jamia Coley    Procedure: Procedure(s):  ESOPHAGOGASTRODUODENOSCOPY, WITH HEMORRHAGE CONTROL       Diagnosis: Upper GI bleed [K92.2]  Diagnosis Additional Information: No value filed.    Anesthesia Type:   No value filed.     Note:      Level of Consciousness: awake  Oxygen Supplementation: face mask    Independent Airway: airway patency satisfactory and stable    Vital Signs Stable: post-procedure vital signs reviewed and stable  Report to RN Given: handoff report given  Patient transferred to: Phase II    Handoff Report: Identifed the Patient, Identified the Reponsible Provider, Reviewed the pertinent medical history, Discussed the surgical course, Reviewed Intra-OP anesthesia mangement and issues during anesthesia, Set expectations for post-procedure period and Allowed opportunity for questions and acknowledgement of understanding      Vitals:  Vitals Value Taken Time   /110 02/18/22 1710   Temp     Pulse 97 02/18/22 1710   Resp     SpO2 96 % 02/18/22 1719   Vitals shown include unvalidated device data.    Electronically Signed By: JOSH Witt CRNA  February 18, 2022  5:19 PM

## 2022-02-18 NOTE — ED NOTES
Pt anxious and trying out of bed, needs repositioning frequently. Prn meds given per order. Will monitor

## 2022-02-18 NOTE — BRIEF OP NOTE
[facility]    Brief Operative Note    Pre-operative diagnosis: Upper GI bleed [K92.2]  Post-operative diagnosis 1. Bleeding ulcer  2. Intact 18 Fr Gastrostomy    Procedure: Procedure(s):  ESOPHAGOGASTRODUODENOSCOPY, WITH HEMORRHAGE CONTROL  Surgeon: Surgeon(s) and Role:     * Star Martinez DO - Primary  Anesthesia: General   Estimated Blood Loss: Minimal from procedure    Drains: None  Specimens: * No specimens in log *  Findings:   Bleeding ulcer adjacent to intact gastrostomy tube.  This was controlled with 7mL of 1:10,000 epinephrine, 2 MRI conditional hemostatic clips, electrocautery  Complications: None.  Implants: * No implants in log *      Recc: BID PPI, Carafate QID Liquid via PEG, NPO today    Star Martinez DO on 2/18/2022 at 5:17 PM

## 2022-02-18 NOTE — ANESTHESIA PREPROCEDURE EVALUATION
Anesthesia Pre-Procedure Evaluation    Patient: Jamia Coley   MRN: [id number] : 1932        Preoperative Diagnosis: Upper GI bleed [K92.2]    Procedure : Procedure(s):  ESOPHAGOGASTRODUODENOSCOPY (EGD)          Past Medical History:   Diagnosis Date     ACS (acute coronary syndrome) (H) 10/1/2012     Anxiety      Cataract      Dementia (H) 1/10/2022     History of syncope 2016     Hypertension      Thyroid disease      Transient ischemic attack (TIA), and cerebral infarction without residual deficits(V12.54) 2010      Past Surgical History:   Procedure Laterality Date     GYN SURGERY        Allergies   Allergen Reactions     Penicillins Anaphylaxis, Itching and Rash     Donepezil Unknown     Galantamine Other (See Comments)     Bradycardia     Rivastigmine Dizziness      Social History     Tobacco Use     Smoking status: Not on file     Smokeless tobacco: Not on file   Substance Use Topics     Alcohol use: No      Wt Readings from Last 1 Encounters:   22 73.8 kg (162 lb 11.2 oz)        Anesthesia Evaluation   Pt has had prior anesthetic. Type: General and MAC.    History of anesthetic complications  - PONV.      ROS/MED HX  ENT/Pulmonary:     (+) sleep apnea,     Neurologic:     (+) dementia, CVA, TIA,     Cardiovascular:     (+) Dyslipidemia hypertension--CAD -past MI --Taking blood thinners CHF fainting (syncope). dysrhythmias, a-fib,     METS/Exercise Tolerance:     Hematologic:       Musculoskeletal:       GI/Hepatic:  - neg GI/hepatic ROS     Renal/Genitourinary:  - neg Renal ROS     Endo:     (+) thyroid problem, hypothyroidism, Obesity,     Psychiatric/Substance Use:       Infectious Disease:       Malignancy:       Other:            Physical Exam    Airway        Mallampati: II   TM distance: > 3 FB   Neck ROM: full   Mouth opening: > 3 cm    Respiratory Devices and Support         Dental  no notable dental history         Cardiovascular   cardiovascular exam normal           Pulmonary   pulmonary exam normal                OUTSIDE LABS:  CBC:   Lab Results   Component Value Date    WBC 9.6 02/18/2022    WBC 6.6 01/28/2022    HGB 10.9 (L) 02/18/2022    HGB 12.5 02/18/2022    HCT 39.5 02/18/2022    HCT 42.3 01/28/2022     02/18/2022     01/28/2022     BMP:   Lab Results   Component Value Date     02/18/2022     02/07/2022    POTASSIUM 4.4 02/18/2022    POTASSIUM 4.6 02/07/2022    CHLORIDE 105 02/18/2022    CHLORIDE 103 02/07/2022    CO2 28 02/18/2022    CO2 31 02/07/2022    BUN 41 (H) 02/18/2022    BUN 24 02/07/2022    CR 0.63 02/18/2022    CR 0.71 02/07/2022     (H) 02/18/2022    GLC 79 02/07/2022     COAGS:   Lab Results   Component Value Date    PTT 33 02/18/2022    INR 1.27 (H) 02/18/2022     POC: No results found for: BGM, HCG, HCGS  HEPATIC:   Lab Results   Component Value Date    ALBUMIN 3.0 (L) 02/18/2022    PROTTOTAL 6.6 (L) 02/18/2022    ALT 32 02/18/2022    AST 16 02/18/2022    ALKPHOS 71 02/18/2022    BILITOTAL 0.7 02/18/2022     OTHER:   Lab Results   Component Value Date    KYLE 8.9 02/18/2022    PHOS 3.2 09/30/2012    MAG 2.4 (H) 09/30/2012    TSH 2.21 10/01/2012       Anesthesia Plan    ASA Status:  4, emergent                     Consents    Anesthesia Plan(s) and associated risks, benefits, and realistic alternatives discussed. Questions answered and patient/representative(s) expressed understanding.    - Discussed:     - Discussed with:  Parent (Mother and/or Father)         Postoperative Care    Pain management: IV analgesics.   PONV prophylaxis: Ondansetron (or other 5HT-3)     Comments:                JOSH Witt CRNA

## 2022-02-18 NOTE — PROGRESS NOTES
[facility] GERIATRICS    Chief Complaint   Patient presents with     RECHECK     HPI: Jamia Coley is a 89 year old (2/28/1932), who is being seen today for an episodic care visit at: [facility] ([facility]) [[id number]].     Brief Summary of Hospital Course: Jamia Coley lives in assisted living facility at baseline and has a past medical history of CHF, afib, CAD, hypertension, dementia, depression and anxiety, glaucoma, macular degeneration, hypothyroidism, obesity, WATSON. S/he was admitted to the hospital with increased confusion, changes in speech and L facial droop and found to have new CVA in L Lateral clark and R occipital lobe. 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 The hospital stay was complicated with new finding of dysphagia and unable to tolerate oral intake so Gtube was placed.  She also developed thrush, and a rash of unknown origin.    Follow up needed:  -Neurology, [facility], Dr. Hartley, per routine  -consider Vascular follow up if failing medical management     Recent changes:  -1/19 - discontinue scheduled miralax, clarify nystatin S&S  -1/24 - discontinue benadryl, meclizine, prn quetiapine, vit B12 and decrease cetirizine to prn   -1/27 - MOCA 1/30, dependent for all ADLs and transfers  -1/28/2022 - add gabapentin and quetiapine for pain, anxiety, agitation control   2/1 MD visit  2/3 better anxiety today - orders: Change Seroquel to 6.25 mg p.o. every 6 hours as needed x14 days .   Check CBC, BMP, BNP tomorrow diagnosis edema  2/4 increase in edema. Lasix increased to 40 mg and potassium increased to 20 meq.   2/7 discontinue cetrizine due to no use, increase sertraline to 100 mg daily, TF decreased. Amlodipine stopped.  2/17 had GT replaced with IR for clogged and misplaced tube.    Chief Concerns Today: nurse reports blood in g tube and black stool.     ALLERGIES: Penicillins, Donepezil,  "Galantamine, and Rivastigmine  Past Medical, Surgical, Family and Social History reviewed and updated in EPIC.  Medication list and progress notes reviewed in nursing home electronic health record    ROS:  Unobtainable secondary to cognitive impairment.     Vitals:  BP (!) 171/86   Pulse 86   Temp 98.2  F (36.8  C)   Resp 20   Ht 1.676 m (5' 6\")   Wt 73.8 kg (162 lb 11.2 oz)   SpO2 99%   BMI 26.26 kg/m    Exam:  GENERAL APPEARANCE:  Alert, anxious sitting in her wheelchair  CHEST/RESP:  respiratory effort normal, no respiratory distress, lung sounds diminished throughout. Pitting edema moderate of right upper extrem and 2+ pitting of bilateral lower extrem.   CV:  Rate reg, rhythm reg, no murmur,  M/S:   extremities abnormal-R UE weak, gait abnormal-unable to ambulate,  NEURO: Right facial droop.   PSYCH:  at baseline mentation is confused/disoriented.     Assessment/Plan:  Gastrointestinal hemorrhage, unspecified gastrointestinal hemorrhage type  Dysphagia, unspecified type  Feeding by G-tube  Ongoing and chronic. Diet pureed, nectar thick liquids. Patient had g tube replaced yesterday now today had dark red blood with clots out through the tube. The nurse pulled back on the syringe to check for residual and started to get bright red blood about 20 ml which she replaced.   The plan was to hold the apixiban and check a hgb on Monday.   Then a bit later the patient had a black stool. And the decision was made to send her in to be evaluated.   Most recent hgb was ~ 13 on 1/28.    Anxiety   Dementia with behavioral disturbance, unspecified type   Patient with increased anxiety in the setting of dementia and she cannot tell us what is wrong. Sertraline was increased and continued on gabapentin. Also started on scheduled seroquel 12.5 mg po BID. Anxiety continues to be unstable. Continues to be inconsolable at times.   - no changes. Continue supportive care at the TCU.   -  following for discharge " planning     History of CVA (cerebrovascular accident)  Right hemiparesis  Recovering in a TCU. Reviewed goals of care and expected recovery briefly with dtr.    CHF  Continues on lasix. Hasn't helped really at all. Amlodipine stopped. B/ps mostly controlled except for today's reading. With inability to use tube for feeding and fluids will hold off on increasing diuretics.  - monitor closely and follow up later this week.   Wt Readings from Last 4 Encounters:   02/18/22 73.8 kg (162 lb 11.2 oz)   02/16/22 74.7 kg (164 lb 11.2 oz)   02/09/22 73.9 kg (163 lb)   02/07/22 70.9 kg (156 lb 6.4 oz)   weight is down a little bit today, continues to have the edema. In light of bleeding will not make changes today.     Total time spent with patient visit at the skilled nursing facility was 35 min including patient visit and review of past records. Greater than 50% of total time spent with counseling and coordinating care due to tenuous situation with patient at high risk for complications due to GI bleeding, G tube and hx of CVA, need for coordination of care and care planning in skilled nursing facility as well as discussion of goals of care and expected course,    Orders:    Hold apixiban x 3 days    Check hgb on Monday    Update IR on bleeding.     Send to ER for evaluation of bleeding.     Electronically signed by: Susie Bay NP

## 2022-02-18 NOTE — H&P
[facility]    History and Physical - Hospitalist Service       Date of Admission:  2/18/2022    Assessment & Plan   Jamia Coley is a 89 year old female admitted on 2/18/2022. She presents with bleeding from her PEG tube site.    PEG tube site bleeding  Acute blood loss anemia  PEG tube was initially placed on 1/12/2022, but was replaced on 2/17/2022 due to tube clogging.  Hemoglobin decreased from 12.5-10.9 in the emergency department.  On January 28, 2022 hemoglobin was 13.2.  Will follow serial hemoglobins over night, and transfuse to maintain hemoglobin greater than 7.0.  EGD demonstrated bleeding from the previous PEG tube tract; the area was injected and cauterized.  Will place on IV Protonix and Carafate.    Possible aspiration  In the PACU, patient was requiring increased suctioning.  She was admitted to the intensive care unit overnight due to increased care needs.  Antibiotics were not initiated.    Dysphagia  The patient will be n.p.o. overnight until bleeding is stabilized.  Normally, she receives tube feeding with high Kcal w fiber- Jevity 1.5 Kiet continuous feeding at 40 cc/hr, as well as a dysphagia 1 pureed diet with honey thick liquids and free water flushes 130cc every 6 hours.  Will place on normal saline 75 cc an hour overnight.    Dementia with behavioral dyscontrol  Depression  Anxiety  The patient will be one-to-one.  Continue prior to admission gabapentin, Seroquel, and sertraline.  We will order as needed IV and IM Haldol.    History of CVA  Right hemiparesis  The patient is dependent for all ADLs and transfers.    Chronic systolic heart failure  Hypertension  Continue prior to admission lisinopril.  Hold furosemide while n.p.o.    Atrial fibrillation  The patient is not on rate control medication.  Hold prior to admission apixaban due to bleeding.    Hypothyroidism  Continue prior to admission levothyroxine.    Glaucoma  Continue prior to admission  "Alphagan, Cosopt, and Xalatan.    Clinically Significant Risk Factors Present on Admission              # Coagulation Defect: home medication list includes an anticoagulant medication    # Overweight: Estimated body mass index is 26.26 kg/m  as calculated from the following:    Height as of an earlier encounter on 2/18/22: 1.676 m (5' 6\").    Weight as of an earlier encounter on 2/18/22: 73.8 kg (162 lb 11.2 oz).       Diet: NPO for Medical/Clinical Reasons Except for: No Exceptions    DVT Prophylaxis: Pneumatic Compression Devices  Richardson Catheter: Not present  Code Status:   DNR/DNI per POLST    Disposition Plan   Anticipate discharge back to [facility] once bleeding controlled and patient tolerating tube feeds.    Entered: Sly Griffin MD 02/18/2022, 5:02 PM       Sly Griffin MD  [facility]    ______________________________________________________________________    Chief Complaint   Chief Complaint   Patient presents with     Rectal Bleeding     also from G-tube. from [facility]        History is obtained from the patient    History of Present Illness   Jamia Coley is a 89 year old female who lives at the assisted living facility at [facility] on [facility].  Yesterday she underwent replacement of a clogged G-tube.  Today, when her nurse pulled back to check for residuals, she noted bright red blood.  The patient later passed a black stool.  The patient was sent to the emergency department for further evaluation.  She had an EGD today which demonstrated bleeding from what appeared to be the previous PEG tube tract.  The area was injected and cauterized.  She was given a dose of IV Protonix.    She was admitted to [facility] in [facility] on January 1 with changes in speech and the left facial droop.  CTA showed a 70-90% proximal CADE stenosis and 50% LICA stenosis.  MRI demonstrated severe atrophy and severe chronic periventricular white matter disease and a " small to moderate sized infarct of the central left lateral aspect of the clark and at tiny cortical infarct of the right occipital lobe.  The patient's stroke was thought possibly related to her R ICA stenosis, but she was not felt to be a good surgical candidate.    Review of systems is unable to be obtained due to patient's dementia.    Review of Systems    The 10 point Review of Systems is negative other than noted in the HPI or here.     Past Medical History    I have reviewed this patient's medical history and updated it with pertinent information if needed.   Past Medical History:   Diagnosis Date     ACS (acute coronary syndrome) (H) 10/1/2012     Anxiety      Cataract      Dementia (H) 1/10/2022     History of syncope 8/5/2016     Hypertension      Thyroid disease      Transient ischemic attack (TIA), and cerebral infarction without residual deficits(V12.54) 11/30/2010       Patient Active Problem List    Diagnosis Date Noted     Coronary atherosclerosis 01/10/2022     Priority: Medium     Osteoporosis 01/10/2022     Priority: Medium     Macular degeneration 01/10/2022     Priority: Medium     Long term current use of anticoagulant 01/10/2022     Priority: Medium     Glaucoma 01/10/2022     Priority: Medium     Generalized anxiety disorder 01/10/2022     Priority: Medium     TIA (transient ischemic attack) 01/03/2022     Priority: Medium     COVID-19 11/27/2020     Priority: Medium     Depression 10/09/2020     Priority: Medium     Inadequate vitamin B12 intake 06/05/2020     Priority: Medium     Paroxysmal atrial fibrillation (H) 08/08/2016     Priority: Medium     Formatting of this note might be different from the original.  -8/8/2016 Holter monitor  Baseline heart rhythm was mostly a sinus rhythm with intermittent runs of atrial fibrillation  Average heart rate was 68  No pauses present.  Supraventricular events comprising 6 percent of beats, 523 runs of atrial fibrillation, the longest being 33 beats in  length with average heart rates of approximately 120.  No symptoms noted  [id number]-Arwd score at least 6 HTN, age-2, gender, TIA-2  Formatting of this note might be different from the original.  Formatting of this note might be different from the original.  -8/8/2016 Holter monitor  Baseline heart rhythm was mostly a sinus rhythm with intermittent runs of atrial fibrillation  Average heart rate was 68  No pauses present.  Supraventricular events comprising 6 percent of beats, 523 runs of atrial fibrillation, the longest being 33 beats in length with average heart rates of approximately 120.  No symptoms noted  [id number]-Pnfn score at least 6 HTN, age-2, gender, TIA-2       Contact dermatitis and other eczema due to other specified agent 11/15/2012     Priority: Medium     Syncope 09/30/2012     Priority: Medium     Hypothyroidism 08/30/2012     Priority: Medium     Benign paroxysmal positional vertigo 04/13/2012     Priority: Medium     Constipation 02/20/2012     Priority: Medium     Hyperlipidemia 10/10/2011     Priority: Medium     Cataract 04/07/2011     Priority: Medium     Obstructive sleep apnea (adult) (pediatric) 08/19/2010     Priority: Medium     Sensorineural hearing loss, bilateral 08/31/2009     Priority: Medium     Toxic diffuse goiter 02/28/2003     Priority: Medium     Essential hypertension 02/14/2003     Priority: Medium     Formatting of this note might be different from the original.  -4/2/2016 Mercaux Sinus rhythm during study.  Normal LV size, and systolic function with visually estimated EF ~55-60%.  Mild concentric LVH.  Normal RV size and function. Mild LA dilatation. Compared to report of study 7/20/2009, No significant interval changes are noted.  Formatting of this note might be different from the original.  Formatting of this note might be different from the original.  -4/2/2016 Mercaux Sinus rhythm during study.  Normal LV size, and systolic function with visually  estimated EF ~55-60%.  Mild concentric LVH.  Normal RV size and function. Mild LA dilatation. Compared to report of study 2009, No significant interval changes are noted.       Obesity, unspecified 2003     Priority: Medium        Past Surgical History   I have reviewed this patient's surgical history and updated it with pertinent information if needed.  Past Surgical History:   Procedure Laterality Date     GYN SURGERY         Social History   I have reviewed this patient's social history and updated it with pertinent information if needed.  Social History     Tobacco Use     Smoking status: Not on file     Smokeless tobacco: Not on file   Substance Use Topics     Alcohol use: No     Drug use: Not on file       Family History   I have reviewed this patient's family history and updated it with pertinent information if needed.   No family history on file.    Prior to Admission Medications   Prior to Admission Medications   Prescriptions Last Dose Informant Patient Reported? Taking?   QUEtiapine (SEROQUEL) 25 MG tablet   Yes No   Sig: Take 6.25 mg by mouth every 6 hours as needed Agitation and anxiety    QUEtiapine (SEROQUEL) 25 MG tablet   Yes No   Sig: Take 12.5 mg by mouth 2 times daily   acetaminophen (TYLENOL) 32 mg/mL liquid   Yes No   Sig: Take 650 mg by mouth 4 times daily 650 mg = 20.3 ml , and 650 mg q6h prn    alendronate (FOSAMAX) 70 MG tablet   Yes No   Sig: Take 70 mg by mouth every 7 days On Sundays   apixaban ANTICOAGULANT (ELIQUIS) 2.5 MG tablet   Yes No   Si.5 mg by Per G Tube route 2 times daily   brimonidine (ALPHAGAN) 0.2 % ophthalmic solution   Yes No   Sig: Place 1 drop Into the left eye 2 times daily   dorzolamide-timolol PF (COSOPT) 22.3-6.8 MG/ML opthalmic solution   Yes No   Sig: Place 1 drop Into the left eye 2 times daily   furosemide (LASIX) 20 MG tablet   Yes No   Si mg by Oral or Feeding Tube route daily   gabapentin (NEURONTIN) 100 MG capsule   No No   Si mg  tid scheduled  100 mg qid prn agitation   latanoprost (XALATAN) 0.005 % ophthalmic solution   Yes No   Sig: Place 1 drop Into the left eye At Bedtime   levothyroxine (SYNTHROID) 150 MCG tablet   Yes No   Si mcg by Per G Tube route every evening   lisinopril (ZESTRIL) 20 MG tablet   Yes No   Si mg by Per G Tube route daily   multivitamin (OCUVITE) TABS tablet   Yes No   Sig: Take 1 tablet by mouth daily   potassium chloride (KAYCIEL) 20 MEQ/15ML (10%) solution   Yes No   Sig: 15 mLs by Per G Tube route daily   psyllium (METAMUCIL) 28 % packet   Yes No   Si packet by Per G Tube route 2 times daily   rosuvastatin (CRESTOR) 5 MG tablet   Yes No   Si mg by Per G Tube route daily   sertraline (ZOLOFT) 20 MG/ML (HIGH CONC) solution   Yes No   Si mg by Per G Tube route daily 5 ml = 100 mg    vitamin D3 (CHOLECALCIFEROL) 50 mcg (2000 units) tablet   Yes No   Sig: Take 1 tablet by mouth daily      Facility-Administered Medications: None     Allergies   Allergies   Allergen Reactions     Penicillins Anaphylaxis, Itching and Rash     Donepezil Unknown     Galantamine Other (See Comments)     Bradycardia     Rivastigmine Dizziness       Physical Exam   Vital Signs: Temp: 98  F (36.7  C) Temp src: Oral BP: (!) 120/100 Pulse: 87   Resp: 10 SpO2: 94 % O2 Device: None (Room air)    Weight: 0 lbs 0 oz    Gen: Somnolent, debilitated, elderly female, resting comfortably in bed, no acute distress  Head: atraumatic normocephalic; sclera non-injected, anicterric; oral mucosa moist  Neck: supple without spinal abnormality  Chest: clear to auscultation bilaterally, no wheezes, no rhonchi, no rales.  Cardiovascular: regular rate and rhythm, no gallops or rubs, no murmurs, no edema  Abdomen: bowel sounds normal, no hepatosplenomegaly, no masses, non-tender, non-distended, no guarding, no rebound tenderness  Musculoskeletal: normal muscle mass, normal muscle tone  Skin: no rashes, no chronic venous stasis  Lymph: no  lymphadenopathy.  Neuro: Unable to be examined due to patient sedation    Data   Data reviewed today: I reviewed all medications, new labs and imaging results over the last 24 hours. I personally reviewed no images or EKG's today.    Recent Labs   Lab 02/18/22  1516 02/18/22  1202   WBC  --  9.6   HGB 10.9* 12.5   MCV  --  105*   PLT  --  238   INR  --  1.27*   NA  --  138   POTASSIUM  --  4.4   CHLORIDE  --  105   CO2  --  28   BUN  --  41*   CR  --  0.63   ANIONGAP  --  5   KYLE  --  8.9   GLC  --  107*   ALBUMIN  --  3.0*   PROTTOTAL  --  6.6*   BILITOTAL  --  0.7   ALKPHOS  --  71   ALT  --  32   AST  --  16         No results found for this or any previous visit (from the past 24 hour(s)).

## 2022-02-19 PROBLEM — Z79.01 CHRONIC ANTICOAGULATION: Status: ACTIVE | Noted: 2022-02-19

## 2022-02-19 PROBLEM — K92.2 GASTROINTESTINAL HEMORRHAGE, UNSPECIFIED GASTROINTESTINAL HEMORRHAGE TYPE: Status: ACTIVE | Noted: 2022-02-19

## 2022-02-19 PROBLEM — D64.9 ANEMIA, UNSPECIFIED TYPE: Status: ACTIVE | Noted: 2022-02-19

## 2022-02-19 LAB
ANION GAP SERPL CALCULATED.3IONS-SCNC: 3 MMOL/L (ref 3–14)
ANTIBODY ID: NORMAL
BUN SERPL-MCNC: 36 MG/DL (ref 7–30)
CALCIUM SERPL-MCNC: 7.7 MG/DL (ref 8.5–10.1)
CHLORIDE BLD-SCNC: 111 MMOL/L (ref 94–109)
CO2 SERPL-SCNC: 28 MMOL/L (ref 20–32)
CREAT SERPL-MCNC: 0.67 MG/DL (ref 0.52–1.04)
ERYTHROCYTE [DISTWIDTH] IN BLOOD BY AUTOMATED COUNT: 15.9 % (ref 10–15)
GFR SERPL CREATININE-BSD FRML MDRD: 83 ML/MIN/1.73M2
GLUCOSE BLD-MCNC: 131 MG/DL (ref 70–99)
GLUCOSE BLDC GLUCOMTR-MCNC: 108 MG/DL (ref 70–99)
HCT VFR BLD AUTO: 29.9 % (ref 35–47)
HGB BLD-MCNC: 10.1 G/DL (ref 11.7–15.7)
HGB BLD-MCNC: 8.7 G/DL (ref 11.7–15.7)
HGB BLD-MCNC: 9.6 G/DL (ref 11.7–15.7)
HGB BLD-MCNC: 9.6 G/DL (ref 11.7–15.7)
MCH RBC QN AUTO: 33.1 PG (ref 26.5–33)
MCHC RBC AUTO-ENTMCNC: 32.1 G/DL (ref 31.5–36.5)
MCV RBC AUTO: 103 FL (ref 78–100)
PLATELET # BLD AUTO: 197 10E3/UL (ref 150–450)
POTASSIUM BLD-SCNC: 4.1 MMOL/L (ref 3.4–5.3)
RBC # BLD AUTO: 2.9 10E6/UL (ref 3.8–5.2)
SODIUM SERPL-SCNC: 142 MMOL/L (ref 133–144)
SPECIMEN EXPIRATION DATE: NORMAL
WBC # BLD AUTO: 10.4 10E3/UL (ref 4–11)

## 2022-02-19 PROCEDURE — 80048 BASIC METABOLIC PNL TOTAL CA: CPT | Performed by: INTERNAL MEDICINE

## 2022-02-19 PROCEDURE — 250N000011 HC RX IP 250 OP 636: Performed by: INTERNAL MEDICINE

## 2022-02-19 PROCEDURE — 85018 HEMOGLOBIN: CPT | Performed by: INTERNAL MEDICINE

## 2022-02-19 PROCEDURE — 250N000013 HC RX MED GY IP 250 OP 250 PS 637: Performed by: INTERNAL MEDICINE

## 2022-02-19 PROCEDURE — 99232 SBSQ HOSP IP/OBS MODERATE 35: CPT | Performed by: HOSPITALIST

## 2022-02-19 PROCEDURE — C9113 INJ PANTOPRAZOLE SODIUM, VIA: HCPCS | Performed by: INTERNAL MEDICINE

## 2022-02-19 PROCEDURE — 258N000003 HC RX IP 258 OP 636: Performed by: HOSPITALIST

## 2022-02-19 PROCEDURE — 120N000001 HC R&B MED SURG/OB

## 2022-02-19 PROCEDURE — 36415 COLL VENOUS BLD VENIPUNCTURE: CPT | Performed by: INTERNAL MEDICINE

## 2022-02-19 RX ORDER — DEXTROSE MONOHYDRATE 100 MG/ML
INJECTION, SOLUTION INTRAVENOUS CONTINUOUS PRN
Status: DISCONTINUED | OUTPATIENT
Start: 2022-02-19 | End: 2022-02-19

## 2022-02-19 RX ADMIN — PANTOPRAZOLE SODIUM 40 MG: 40 INJECTION, POWDER, FOR SOLUTION INTRAVENOUS at 09:45

## 2022-02-19 RX ADMIN — SUCRALFATE 1 G: 1 SUSPENSION ORAL at 22:05

## 2022-02-19 RX ADMIN — SERTRALINE HYDROCHLORIDE 100 MG: 20 SOLUTION ORAL at 09:47

## 2022-02-19 RX ADMIN — DORZOLAMIDE HYDROCHLORIDE AND TIMOLOL MALEATE 1 DROP: 20; 5 SOLUTION OPHTHALMIC at 09:45

## 2022-02-19 RX ADMIN — BRIMONIDINE TARTRATE 1 DROP: 2 SOLUTION/ DROPS OPHTHALMIC at 09:46

## 2022-02-19 RX ADMIN — SUCRALFATE 1 G: 1 SUSPENSION ORAL at 16:30

## 2022-02-19 RX ADMIN — GABAPENTIN 200 MG: 100 CAPSULE ORAL at 09:46

## 2022-02-19 RX ADMIN — DORZOLAMIDE HYDROCHLORIDE AND TIMOLOL MALEATE 1 DROP: 20; 5 SOLUTION OPHTHALMIC at 20:59

## 2022-02-19 RX ADMIN — SODIUM CHLORIDE, POTASSIUM CHLORIDE, SODIUM LACTATE AND CALCIUM CHLORIDE 500 ML: 600; 310; 30; 20 INJECTION, SOLUTION INTRAVENOUS at 09:50

## 2022-02-19 RX ADMIN — LATANOPROST 1 DROP: 50 SOLUTION OPHTHALMIC at 21:07

## 2022-02-19 RX ADMIN — BRIMONIDINE TARTRATE 1 DROP: 2 SOLUTION/ DROPS OPHTHALMIC at 20:59

## 2022-02-19 RX ADMIN — PANTOPRAZOLE SODIUM 40 MG: 40 INJECTION, POWDER, FOR SOLUTION INTRAVENOUS at 20:57

## 2022-02-19 RX ADMIN — SUCRALFATE 1 G: 1 SUSPENSION ORAL at 06:21

## 2022-02-19 RX ADMIN — SUCRALFATE 1 G: 1 SUSPENSION ORAL at 12:39

## 2022-02-19 RX ADMIN — ROSUVASTATIN CALCIUM 5 MG: 5 TABLET, FILM COATED ORAL at 09:51

## 2022-02-19 RX ADMIN — LEVOTHYROXINE SODIUM 150 MCG: 0.15 TABLET ORAL at 18:02

## 2022-02-19 RX ADMIN — QUETIAPINE 12.5 MG: 25 TABLET ORAL at 09:47

## 2022-02-19 ASSESSMENT — ACTIVITIES OF DAILY LIVING (ADL)
ADLS_ACUITY_SCORE: 29
ADLS_ACUITY_SCORE: 31
ADLS_ACUITY_SCORE: 31
ADLS_ACUITY_SCORE: 29
ADLS_ACUITY_SCORE: 29
ADLS_ACUITY_SCORE: 31
ADLS_ACUITY_SCORE: 31
ADLS_ACUITY_SCORE: 29
ADLS_ACUITY_SCORE: 31
ADLS_ACUITY_SCORE: 29
ADLS_ACUITY_SCORE: 29
ADLS_ACUITY_SCORE: 31
ADLS_ACUITY_SCORE: 31
ADLS_ACUITY_SCORE: 29
ADLS_ACUITY_SCORE: 31
ADLS_ACUITY_SCORE: 31
ADLS_ACUITY_SCORE: 29
ADLS_ACUITY_SCORE: 31
ADLS_ACUITY_SCORE: 21
ADLS_ACUITY_SCORE: 28
ADLS_ACUITY_SCORE: 31
DEPENDENT_IADLS:: CLEANING;COOKING;LAUNDRY;SHOPPING;MEAL PREPARATION;MEDICATION MANAGEMENT;MONEY MANAGEMENT;TRANSPORTATION;INCONTINENCE
ADLS_ACUITY_SCORE: 31
ADLS_ACUITY_SCORE: 29
ADLS_ACUITY_SCORE: 28

## 2022-02-19 NOTE — PROGRESS NOTES
Report given to Cindy Dacosta at bedside in recovery for transfer of care Scott Sturges, RN on 2/18/2022 at 6:26 PM

## 2022-02-19 NOTE — PROGRESS NOTES
Pt transferred from ICU - nonverbal, just moans & furrows brow when any intervention to body done.  GT site looks wnl.  Compression stockings removed due to zippers causing skin irritation & indents in skin. SCDs placed on instead.

## 2022-02-19 NOTE — PROGRESS NOTES
Pt. Requiring less o2 now, on NC, 3L sats 96%, less restless, requiring less suctioning. Second Rn at bedside with pt. Consoling pt.

## 2022-02-19 NOTE — PROGRESS NOTES
[facility] [facility] [place]    Patient admitted to room 5 at approximately 1905 via cart from [email or web address]. Patient was accompanied by nurse.     Verbal SBAR report received from ED RN prior to patient arrival.     Patient trasferred to bed via air germania. Patient alert and oriented X 1. The patient is not having any pain.  . Admission vital signs: Blood pressure (!) 190/79, pulse 90, temperature 97.1  F (36.2  C), temperature source Oral, resp. rate 18, SpO2 96 %. Patient was oriented to plan of care, call light, bed controls, tv, telephone, bathroom and visiting hours.     Risk Assessment    The following safety risks were identified during admission: fall. Yellow risk band applied: YES.     Skin Initial Assessment    This writer admitted this patient and completed a full skin assessment and Cornelio score in the Adult PCS flowsheet. Appropriate interventions initiated as needed.     Secondary skin check completed by Lisa Dacosta.         Education      Shana Diaz RN

## 2022-02-19 NOTE — ED PROVIDER NOTES
History     Chief Complaint   Patient presents with     Rectal Bleeding     also from G-tube. from [facility]  Jamia Coley is a 89 year old female who presents from nursing home where she is convalescing.  She has had a stroke, and ended up with dysphagia and aspiration, prompting G-tube placement.  She had her G-tube switched out yesterday and was noted to have blood in the tube today prompting evaluation here.  History is from patient's daughter, patient is unable to give any meaningful history, essentially all she does is cry out or sleep during her stay in the emergency department.  There is no reported recent fall.  No recent febrile illness.  Chronically anticoagulated apixaban secondary to atrial fibrillation    Allergies:  Allergies   Allergen Reactions     Penicillins Anaphylaxis, Itching and Rash     Donepezil Unknown     Galantamine Other (See Comments)     Bradycardia     Rivastigmine Dizziness       Problem List:    Patient Active Problem List    Diagnosis Date Noted     Upper GI bleed 02/18/2022     Priority: Medium     Coronary atherosclerosis 01/10/2022     Priority: Medium     Osteoporosis 01/10/2022     Priority: Medium     Macular degeneration 01/10/2022     Priority: Medium     Long term current use of anticoagulant 01/10/2022     Priority: Medium     Glaucoma 01/10/2022     Priority: Medium     Generalized anxiety disorder 01/10/2022     Priority: Medium     TIA (transient ischemic attack) 01/03/2022     Priority: Medium     COVID-19 11/27/2020     Priority: Medium     Depression 10/09/2020     Priority: Medium     Inadequate vitamin B12 intake 06/05/2020     Priority: Medium     Paroxysmal atrial fibrillation (H) 08/08/2016     Priority: Medium     Formatting of this note might be different from the original.  -8/8/2016 Holter monitor  Baseline heart rhythm was mostly a sinus rhythm with intermittent runs of atrial fibrillation  Average heart rate was 68  No pauses present.   Supraventricular events comprising 6 percent of beats, 523 runs of atrial fibrillation, the longest being 33 beats in length with average heart rates of approximately 120.  No symptoms noted  [id number] score at least 6 HTN, age-2, gender, TIA-2  Formatting of this note might be different from the original.  Formatting of this note might be different from the original.  -8/8/2016 Holter monitor  Baseline heart rhythm was mostly a sinus rhythm with intermittent runs of atrial fibrillation  Average heart rate was 68  No pauses present.  Supraventricular events comprising 6 percent of beats, 523 runs of atrial fibrillation, the longest being 33 beats in length with average heart rates of approximately 120.  No symptoms noted  [id number]-Plgn score at least 6 HTN, age-2, gender, TIA-2       Contact dermatitis and other eczema due to other specified agent 11/15/2012     Priority: Medium     Syncope 09/30/2012     Priority: Medium     Hypothyroidism 08/30/2012     Priority: Medium     Benign paroxysmal positional vertigo 04/13/2012     Priority: Medium     Constipation 02/20/2012     Priority: Medium     Hyperlipidemia 10/10/2011     Priority: Medium     Cataract 04/07/2011     Priority: Medium     Obstructive sleep apnea (adult) (pediatric) 08/19/2010     Priority: Medium     Sensorineural hearing loss, bilateral 08/31/2009     Priority: Medium     Toxic diffuse goiter 02/28/2003     Priority: Medium     Essential hypertension 02/14/2003     Priority: Medium     Formatting of this note might be different from the original.  -4/2/2016 ECHO [facility] Sinus rhythm during study.  Normal LV size, and systolic function with visually estimated EF ~55-60%.  Mild concentric LVH.  Normal RV size and function. Mild LA dilatation. Compared to report of study 7/20/2009, No significant interval changes are noted.  Formatting of this note might be different from the original.  Formatting of this note might be different from the  original.  -4/2/2016 [facility] Sinus rhythm during study.  Normal LV size, and systolic function with visually estimated EF ~55-60%.  Mild concentric LVH.  Normal RV size and function. Mild LA dilatation. Compared to report of study 7/20/2009, No significant interval changes are noted.       Obesity, unspecified 01/03/2003     Priority: Medium        Past Medical History:    Past Medical History:   Diagnosis Date     ACS (acute coronary syndrome) (H) 10/1/2012     Anxiety      Cataract      Dementia (H) 1/10/2022     History of syncope 8/5/2016     Hypertension      Thyroid disease      Transient ischemic attack (TIA), and cerebral infarction without residual deficits(V12.54) 11/30/2010       Past Surgical History:    Past Surgical History:   Procedure Laterality Date     GYN SURGERY         Family History:    No family history on file.    Social History:  Marital Status:   [5]  Social History     Tobacco Use     Smoking status: Not on file     Smokeless tobacco: Not on file   Substance Use Topics     Alcohol use: No     Drug use: Not on file        Medications:    No current outpatient medications on file.        Review of Systems  All other systems reviewed and are negative.    Physical Exam   BP: (!) 166/106  Pulse: 103  Temp: 98  F (36.7  C)  Resp: 16  SpO2: 94 %      Physical Exam  Nontoxic appearing no respiratory distress alert oriented to place, hard of hearing, histrionic at times, crying out asking for help.  Head atraumatic normocephalic  Skin is pale warm and dry  Neck supple full active painless range of motion  Lungs clear to auscultation  Heart irregularly irregular mild tachycardia no murmur appreciated  Abdomen soft nontender bowel sounds diminished, G-tube is in place, there is no bleeding surrounding the tube but there is dark blood within the tube.  Strength and sensation grossly intact throughout the extremities  Speech is fluent, good eye contact, thought processes are  rational  Lower extremities without swelling, redness or tenderness  Pedal pulses symmetrical and strong    ED Course                 Procedures  EKG atrial fibrillation rate 111, axes intervals within normal limits, nonspecific T wave changes, no acute ST-T wave changes no Q waves, read by Dr. Aaron Mistry            Critical Care time:  none                  Results for orders placed or performed during the hospital encounter of 02/18/22 (from the past 24 hour(s))   Social Work IP Consult    Narrative    Kae D eLa Fuente LSW     2/19/2022  4:51 PM  Care Management Initial Consult    General Information  Assessment completed with: Patient, Caregiver, Daughter-Víctor  Type of CM/SW Visit: Initial Assessment    Primary Care Provider verified and updated as needed: Yes   Readmission within the last 30 days: unable to assess   Return Category: Exacerbation of disease  Reason for Consult:   discharge planning  Advance Care Planning: Advance Care Planning Reviewed: present on   chart          Communication Assessment  Patient's communication style: spoken language (English or   Bilingual)    Hearing Difficulty or Deaf: yes   Wear Glasses or Blind: no    Cognitive  Cognitive/Neuro/Behavioral: .WDL except  Level of Consciousness:   lethargic, confused  Arousal Level: arouses to voice, arouses to   pain  Orientation: other (see comments) (bridger nonverbal)    Mood/Behavior: hyperactive (agitated, impulsive) (when awake)       Speech: clear, spontaneous    Living Environment:   People in home: facility resident     Current living Arrangements: extended care facility  Name of   Facility: [facility]   Able to return to prior arrangements: yes  Living Arrangement Comments: Needs PT/OT to see to ensure that   Medicare will pay for stay    Family/Social Support:  Care provided by: other (see comments) (TCU staff)  Provides care for: no one, unable/limited ability to care for   self  Marital Status:   Children           Description of Support System: Supportive, Involved    Support Assessment: Adequate family and caregiver support,   Adequate social supports, Caregiver experiencing high stress    Current Resources:   Patient receiving home care services: No     Community Resources: Skilled Nursing Facility  Equipment currently used at home: lift device, hospital bed,   walker, standard  Supplies currently used at home: Incontinence Supplies,   Nutritional Supplements, Enteral Nutrition & Supplies, Wound Care   Supplies, Gloves, Wipes, Chux, Compression Stockings, Hearing Aid   Batteries    Employment/Financial:  Employment Status: retired        Financial Concerns: No concerns identified   Referral to Financial Worker: No       Lifestyle & Psychosocial Needs:  Social Determinants of Health     Tobacco Use: Not on file   Alcohol Use: Not on file   Financial Resource Strain: Not on file   Food Insecurity: Not on file   Transportation Needs: Not on file   Physical Activity: Not on file   Stress: Not on file   Social Connections: Not on file   Intimate Partner Violence: Not on file   Depression: Not on file   Housing Stability: Not on file       Functional Status:  Prior to admission patient needed assistance:   Dependent ADLs:: Ambulation-walker, Bathing, Dressing, Eating,   Grooming, Incontinence, Transfers, Toileting  Dependent IADLs:: Cleaning, Cooking, Laundry, Shopping, Meal   Preparation, Medication Management, Money Management,   Transportation, Incontinence  Assesssment of Functional Status: Not at  functional baseline    Mental Health Status:  Current concern; medication        Chemical Dependency Status:  No concern          Values/Beliefs:  Spiritual, Cultural Beliefs, Moravian Practices, Values that   affect care: no             Additional Information:  Care Management received referral to assist pt with discharge   planning. SW reviewed EMR, learned that pt is currently a   resident at [facility] (Main:  [phone] Admissions:   [phone] Fax: [phone]) TCU; placed call, spoke with   Mary Ann & confirmed bedhold & that pt can return once medically   stable.    As pt is confused & non-verbal per RN notes, MANDY placed call to   pt's daughter, Víctor,  to introduce self/role, perform   assessment, and discuss resources.  Víctor shared that pt has been   at [facility] for about 3 weeks, was discharged from Medicare   covered services.  Víctor felt pt was just beginning to make   strides with her therapy team, appealed Medicare & won so pt can   continue with PT/OT at TCU.  However, Víctor now expressed great   concern that pt is now hospitalized, not getting therapies, so   Medicare may not pay for PT/OT at [facility].  MANDY requested that MD   place PT orders to continue pt's progress while hospitalized.    Víctor shared that her & spouse should be able to transport back   to [facility], if pt is strong enough.  Will make that determination   after PT works with pt.    MANDY informed this writer is available tomorrow, co-workers on   Monday to assist with discharge planning needs.    Care Management team to follow for discharge plans.    Kae De La Fuente, RICARDO         Glucose by meter   Result Value Ref Range    GLUCOSE BY METER POCT 106 (H) 70 - 99 mg/dL   Hemoglobin   Result Value Ref Range    Hemoglobin 10.1 (L) 11.7 - 15.7 g/dL   Basic metabolic panel   Result Value Ref Range    Sodium 142 133 - 144 mmol/L    Potassium 4.1 3.4 - 5.3 mmol/L    Chloride 111 (H) 94 - 109 mmol/L    Carbon Dioxide (CO2) 28 20 - 32 mmol/L    Anion Gap 3 3 - 14 mmol/L    Urea Nitrogen 36 (H) 7 - 30 mg/dL    Creatinine 0.67 0.52 - 1.04 mg/dL    Calcium 7.7 (L) 8.5 - 10.1 mg/dL    Glucose 131 (H) 70 - 99 mg/dL    GFR Estimate 83 >60 mL/min/1.73m2   Hemoglobin   Result Value Ref Range    Hemoglobin 9.6 (L) 11.7 - 15.7 g/dL   CBC with platelets   Result Value Ref Range    WBC Count 10.4 4.0 - 11.0 10e3/uL    RBC Count 2.90 (L) 3.80 - 5.20 10e6/uL     Hemoglobin 9.6 (L) 11.7 - 15.7 g/dL    Hematocrit 29.9 (L) 35.0 - 47.0 %     (H) 78 - 100 fL    MCH 33.1 (H) 26.5 - 33.0 pg    MCHC 32.1 31.5 - 36.5 g/dL    RDW 15.9 (H) 10.0 - 15.0 %    Platelet Count 197 150 - 450 10e3/uL   Hemoglobin   Result Value Ref Range    Hemoglobin 8.7 (L) 11.7 - 15.7 g/dL   Care Management / Social Work IP Consult    Narrative    FranciscoashantiKaylaMichaeldelgadoKaeRICARDO     2/19/2022  4:51 PM  Care Management Initial Consult    General Information  Assessment completed with: Patient, Caregiver, Daughter-Víctor  Type of CM/SW Visit: Initial Assessment    Primary Care Provider verified and updated as needed: Yes   Readmission within the last 30 days: unable to assess   Return Category: Exacerbation of disease  Reason for Consult:   discharge planning  Advance Care Planning: Advance Care Planning Reviewed: present on   chart          Communication Assessment  Patient's communication style: spoken language (English or   Bilingual)    Hearing Difficulty or Deaf: yes   Wear Glasses or Blind: no    Cognitive  Cognitive/Neuro/Behavioral: .WDL except  Level of Consciousness:   lethargic, confused  Arousal Level: arouses to voice, arouses to   pain  Orientation: other (see comments) (bridger nonverbal)    Mood/Behavior: hyperactive (agitated, impulsive) (when awake)       Speech: clear, spontaneous    Living Environment:   People in home: facility resident     Current living Arrangements: extended care facility  Name of   Facility: [facility] TCU   Able to return to prior arrangements: yes  Living Arrangement Comments: Needs PT/OT to see to ensure that   Medicare will pay for stay    Family/Social Support:  Care provided by: other (see comments) (TCU staff)  Provides care for: no one, unable/limited ability to care for   self  Marital Status:   Children          Description of Support System: Supportive, Involved    Support Assessment: Adequate family and caregiver support,   Adequate social  supports, Caregiver experiencing high stress    Current Resources:   Patient receiving home care services: No     Community Resources: Skilled Nursing Facility  Equipment currently used at home: lift device, hospital bed,   walker, standard  Supplies currently used at home: Incontinence Supplies,   Nutritional Supplements, Enteral Nutrition & Supplies, Wound Care   Supplies, Gloves, Wipes, Chux, Compression Stockings, Hearing Aid   Batteries    Employment/Financial:  Employment Status: retired        Financial Concerns: No concerns identified   Referral to Financial Worker: No       Lifestyle & Psychosocial Needs:  Social Determinants of Health     Tobacco Use: Not on file   Alcohol Use: Not on file   Financial Resource Strain: Not on file   Food Insecurity: Not on file   Transportation Needs: Not on file   Physical Activity: Not on file   Stress: Not on file   Social Connections: Not on file   Intimate Partner Violence: Not on file   Depression: Not on file   Housing Stability: Not on file       Functional Status:  Prior to admission patient needed assistance:   Dependent ADLs:: Ambulation-walker, Bathing, Dressing, Eating,   Grooming, Incontinence, Transfers, Toileting  Dependent IADLs:: Cleaning, Cooking, Laundry, Shopping, Meal   Preparation, Medication Management, Money Management,   Transportation, Incontinence  Assesssment of Functional Status: Not at  functional baseline    Mental Health Status:  Current concern; medication        Chemical Dependency Status:  No concern          Values/Beliefs:  Spiritual, Cultural Beliefs, Cheondoism Practices, Values that   affect care: no             Additional Information:  Care Management received referral to assist pt with discharge   planning. SW reviewed EMR, learned that pt is currently a   resident at [facility] (Main: [phone] Admissions:   [phone] Fax: [phone]) TCU; placed call, spoke with   Mary Ann & confirmed bedhold & that pt can return  once medically   stable.    As pt is confused & non-verbal per RN notes, MANDY placed call to   pt's daughter, Víctor,  to introduce self/role, perform   assessment, and discuss resources.  Víctor shared that pt has been   at [facility] for about 3 weeks, was discharged from Medicare   covered services.  Víctor felt pt was just beginning to make   strides with her therapy team, appealed Medicare & won so pt can   continue with PT/OT at [facility].  However, Víctor now expressed great   concern that pt is now hospitalized, not getting therapies, so   Medicare may not pay for PT/OT at [facility].  MANDY requested that MD   place PT orders to continue pt's progress while hospitalized.    Víctor shared that her & spouse should be able to transport back   to [facility], if pt is strong enough.  Will make that determination   after PT works with pt.    MANDY informed this writer is available tomorrow, co-workers on   Monday to assist with discharge planning needs.    Care Management team to follow for discharge plans.    Kae De La Fuente, YUEW             Medications   metoprolol (LOPRESSOR) injection 5 mg (5 mg Intravenous Given 2/18/22 1816)   naloxone (NARCAN) injection 0.2 mg (has no administration in time range)     Or   naloxone (NARCAN) injection 0.4 mg (has no administration in time range)     Or   naloxone (NARCAN) injection 0.2 mg (has no administration in time range)     Or   naloxone (NARCAN) injection 0.4 mg (has no administration in time range)   sertraline (ZOLOFT) 20 MG/ML (HIGH CONC) solution 100 mg (100 mg Per G Tube Given 2/19/22 0947)   rosuvastatin (CRESTOR) tablet 5 mg (5 mg Per G Tube Given 2/19/22 0951)   lisinopril (ZESTRIL) tablet 20 mg ( Per G Tube Automatically Held 2/24/22 0800)   QUEtiapine (SEROquel) quarter-tab 6.25 mg (has no administration in time range)   brimonidine (ALPHAGAN) 0.2 % ophthalmic solution 1 drop (1 drop Left Eye Given 2/19/22 0946)   dorzolamide-timolol (COSOPT) ophthalmic solution 1 drop (1 drop Left  Eye Given 2/19/22 0945)   latanoprost (XALATAN) 0.005 % ophthalmic solution 1 drop (1 drop Left Eye Given 2/18/22 2256)   levothyroxine (SYNTHROID/LEVOTHROID) tablet 150 mcg (150 mcg Per G Tube Given 2/19/22 1802)   glucose gel 15-30 g (has no administration in time range)     Or   dextrose 50 % injection 25-50 mL (has no administration in time range)     Or   glucagon injection 1 mg (has no administration in time range)   pantoprazole (PROTONIX) IV push injection 40 mg (40 mg Intravenous Given 2/19/22 0945)   ondansetron (ZOFRAN-ODT) ODT tab 4 mg (has no administration in time range)     Or   ondansetron (ZOFRAN) injection 4 mg (has no administration in time range)   haloperidol lactate (HALDOL) injection 5 mg (has no administration in time range)   hydrALAZINE (APRESOLINE) injection 10 mg (has no administration in time range)   labetalol (NORMODYNE/TRANDATE) injection 10 mg (has no administration in time range)   gabapentin (NEURONTIN) capsule 200 mg ( Per G Tube Automatically Held 2/24/22 2000)   QUEtiapine (SEROquel) half-tab 12.5 mg ( Per G Tube Automatically Held 2/24/22 2000)   sucralfate (CARAFATE) suspension 1 g (1 g Per G Tube Given 2/19/22 1630)   midazolam (VERSED) injection 2 mg (2 mg Intravenous Given 2/18/22 1255)   pantoprazole (PROTONIX) IV push injection 80 mg (80 mg Intravenous Given 2/18/22 1212)   ondansetron (ZOFRAN-ODT) ODT tab 4 mg (4 mg Oral Given 2/18/22 1719)     Or   ondansetron (ZOFRAN) injection 4 mg ( Intravenous See Alternative 2/18/22 1719)   dexamethasone (DECADRON) injection 8 mg (8 mg Intravenous Given 2/18/22 1744)   lactated ringers BOLUS 500 mL (500 mLs Intravenous New Bag 2/19/22 0950)       Assessments & Plan (with Medical Decision Making)  Chronic anticoagulation apixaban secondary to atrial fibrillation, convalescing in a nursing home, G-tube changed out yesterday because it was obstructed, blood noted in the G-tube.  Initial hemoglobin 12.5, vitals within normal limits.   Observed for prolonged period in the emergency department, treated with Protonix 80 mg IV, recheck hemoglobin dropped down to 10.9.  Blood pressure remains within normal limits.  Reviewed with Dr. Martinez who took patient to endoscopy.  Reviewed with Sly Griffin MD who accepts patient for admission to hospital.  Called and discussed case with patient's daughter initially.     I have reviewed the nursing notes.    I have reviewed the findings, diagnosis, plan and need for follow up with the patient.       Current Discharge Medication List          Final diagnoses:   Chronic anticoagulation   Anemia, unspecified type   Gastrointestinal hemorrhage, unspecified gastrointestinal hemorrhage type       2/18/2022   [facility] SURGICAL     Aaron Mistry MD  02/19/22 1804       Aaron Mistry MD  02/19/22 1802

## 2022-02-19 NOTE — CONSULTS
Care Management Initial Consult    General Information  Assessment completed with: Patient, Caregiver, Daughter-Víctor  Type of CM/SW Visit: Initial Assessment    Primary Care Provider verified and updated as needed: Yes   Readmission within the last 30 days: unable to assess   Return Category: Exacerbation of disease  Reason for Consult: discharge planning  Advance Care Planning: Advance Care Planning Reviewed: present on chart          Communication Assessment  Patient's communication style: spoken language (English or Bilingual)    Hearing Difficulty or Deaf: yes   Wear Glasses or Blind: no    Cognitive  Cognitive/Neuro/Behavioral: .WDL except  Level of Consciousness: lethargic, confused  Arousal Level: arouses to voice, arouses to pain  Orientation: other (see comments) (bridger nonverbal)  Mood/Behavior: hyperactive (agitated, impulsive) (when awake)     Speech: clear, spontaneous    Living Environment:   People in home: facility resident     Current living Arrangements: extended care facility  [facility]   Able to return to prior arrangements: yes  Living Arrangement Comments: Needs PT/OT to see to ensure that Medicare will pay for stay    Family/Social Support:  Care provided by: other (see comments) (TCU staff)  Provides care for: no one, unable/limited ability to care for self  Marital Status:   Children          Description of Support System: Supportive, Involved    Support Assessment: Adequate family and caregiver support, Adequate social supports, Caregiver experiencing high stress    Current Resources:   Patient receiving home care services: No     Community Resources: Skilled Nursing Facility  Equipment currently used at home: lift device, hospital bed, walker, standard  Supplies currently used at home: Incontinence Supplies, Nutritional Supplements, Enteral Nutrition & Supplies, Wound Care Supplies, Gloves, Wipes, Chux, Compression Stockings, Hearing Aid  Batteries    Employment/Financial:  Employment Status: retired        Financial Concerns: No concerns identified   Referral to Financial Worker: No       Lifestyle & Psychosocial Needs:  Social Determinants of Health     Tobacco Use: Not on file   Alcohol Use: Not on file   Financial Resource Strain: Not on file   Food Insecurity: Not on file   Transportation Needs: Not on file   Physical Activity: Not on file   Stress: Not on file   Social Connections: Not on file   Intimate Partner Violence: Not on file   Depression: Not on file   Housing Stability: Not on file       Functional Status:  Prior to admission patient needed assistance:   Dependent ADLs:: Ambulation-walker, Bathing, Dressing, Eating, Grooming, Incontinence, Transfers, Toileting  Dependent IADLs:: Cleaning, Cooking, Laundry, Shopping, Meal Preparation, Medication Management, Money Management, Transportation, Incontinence  Assesssment of Functional Status: Not at  functional baseline    Mental Health Status:  Current concern; medication        Chemical Dependency Status:  No concern          Values/Beliefs:  Spiritual, Cultural Beliefs, Advent Practices, Values that affect care: no             Additional Information:  Care Management received referral to assist pt with discharge planning. MANDY reviewed EMR, learned that pt is currently a resident at [facility] (Main: [phone] Admissions: [phone] Fax: [phone]) TCU; placed call, spoke with Mary Ann & confirmed bedhold & that pt can return once medically stable.    As pt is confused & non-verbal per RN notes, MANDY placed call to pt's daughter, Víctor,  to introduce self/role, perform assessment, and discuss resources.  Víctor shared that pt has been at [facility] for about 3 weeks, was discharged from Medicare covered services.  Víctor felt pt was just beginning to make strides with her therapy team, appealed Medicare & won so pt can continue with PT/OT at TCU.  However, Víctor now expressed  great concern that pt is now hospitalized, not getting therapies, so Medicare may not pay for PT/OT at TCU.  MANDY requested that MD place PT orders to continue pt's progress while hospitalized.    Víctor shared that her & spouse should be able to transport back to [facility], if pt is strong enough.  Will make that determination after PT works with pt.    MANDY informed this writer is available tomorrow, co-workers on Monday to assist with discharge planning needs.    Care Management team to follow for discharge plans.    RICARDO Calderon

## 2022-02-19 NOTE — PROGRESS NOTES
"[facility] Medicine Progress Note  Date of Service: 02/19/2022    Assessment & Plan              Jamia Coley is a 89 year old female admitted on 2/18/2022. She presents with bleeding from her PEG tube site.    PEG tube site bleeding  Acute blood loss anemia  PEG tube was initially placed on 1/12/2022, but was replaced on 2/17/2022 due to tube clogging.  Hemoglobin decreased from 12.5-10.9 in the emergency department.  On January 28, 2022 hemoglobin was 13.2.  EGD on 2/18/2022 with \"Bleeding ulcer adjacent to intact gastrostomy tube.  This was controlled with 7mL of 1:10,000 epinephrine, 2 MRI conditional hemostatic clips, electrocautery\":  -Continue IV Protonix and Carafate.    Possible aspiration  In the PACU, patient was requiring increased suctioning.  She was admitted to the intensive care unit overnight due to increased care needs.  Antibiotics were not initiated.  Chest x-ray without any evidence of infiltrate.  Oxygen was able to be weaned off by the afternoon of 2/19/2022.  Given rapid improvement I do not think this requires antibiotics at this time.  -Continue to monitor off antibiotics for the time being.    Dysphagia  Patient was kept n.p.o. overnight.  At this point bleeding seems to have stabilized.  -Will reinitiate tube feeding with her home routine Jevity 1.5 Kiet continuous feeding at 40 cc/hr (will start with 10 cc an hour and advance by 10 every 4 hours until goal), as well as a dysphagia 1 pureed diet with honey thick liquids and free water flushes 130cc every 6 hours.         Dementia with behavioral dyscontrol  Depression  Anxiety  - Continue prior to admission sertraline.    - as needed IV and IM Haldol.  --Hold scheduled gabapentin, Seroquel    History of CVA  Right hemiparesis  The patient is dependent for all ADLs and transfers.  Per daughter, she recently ambulated 8 feet at the transitional care.  Prior to her stroke in January, she was " ambulatory and physically functional although had significant dementia.    Chronic systolic heart failure  Hypertension  Now hypotension  Patient was hypotensive early this morning likely secondary to her ACE inhibitor in the setting of hypovolemia from her bleeding.  She was given 500 cc LR bolus and this seems to have ameliorated her hypotension.  Patient was diagnosed with systolic heart failure at the time of her stroke with a newly low EF.  She has not yet had any cardiology follow-up.  Daughter wondering if she should have an echocardiogram.  I reviewed that at this point it does not necessarily , unless she were to consider pursuing angiography which typically is not done in an 89-year-old patient with advanced dementia.  -Will resume home furosemide 40 mg daily starting tomorrow  -She is not on a beta-blocker, unclear why not  -Hold her lisinopril given hypotension requiring IV fluid bolus this morning    Atrial fibrillation  The patient is not on rate control medication.  -Hold prior to admission apixaban due to bleeding.    Hypothyroidism  Continue prior to admission levothyroxine.    Glaucoma  Continue prior to admission Alphagan, Cosopt, and Xalatan.      Diet: Adult Formula Drip Feeding: Continuous Jevity 1.5; Gastrostomy; Goal Rate: 40; mL/hr; Medication - Feeding Tube Flush Frequency: At least 15-30 mL water before and after medication administration and with tube clogging; Initiate at 10 cc/hr and in...  Pureed Diet (level 4) Liquidized/Moderately Thick (level 3)    DVT Prophylaxis: Contraindicated secondary to bleeding  Richardson Catheter: Not present  Central Lines: None  Code Status: No CPR- Do NOT Intubate           Discussion: Seems to be stabilizing.  May be able to discharge back to TCU tomorrow if has an uneventful evening.    Disposition: Anticipate discharge 1 to 2 days    Attestation:  I have reviewed today's vital signs, notes, medications, labs and imaging.    Ok Tejeda  MD [place]       Interval History   Patient had some low blood pressures overnight.    Physical Exam   Temp:  [97.1  F (36.2  C)-99.2  F (37.3  C)] 97.4  F (36.3  C)  Pulse:  [] 80  Resp:  [10-24] 18  BP: ()/() 109/48  SpO2:  [86 %-100 %] 97 %    Weights: There were no vitals filed for this visit. There is no height or weight on file to calculate BMI.    Constitutional: Fatigued appearing elderly female resting in bed, moaning when disturbed.  CV: Regular  Respiratory: CTA bilaterally  GI: Soft, nontender  Skin: Warm and dry  Musculoskeletal: Trace lower extremity edema.    Data   Recent Labs   Lab 02/19/22  1221 02/19/22  0530 02/19/22  0010 02/18/22  2050 02/18/22  1516 02/18/22  1202   WBC  --  10.4  --   --   --  9.6   HGB 8.7* 9.6*  9.6* 10.1*  --    < > 12.5   MCV  --  103*  --   --   --  105*   PLT  --  197  --   --   --  238   INR  --   --   --   --   --  1.27*   NA  --  142  --   --   --  138   POTASSIUM  --  4.1  --   --   --  4.4   CHLORIDE  --  111*  --   --   --  105   CO2  --  28  --   --   --  28   BUN  --  36*  --   --   --  41*   CR  --  0.67  --   --   --  0.63   ANIONGAP  --  3  --   --   --  5   KYLE  --  7.7*  --   --   --  8.9   GLC  --  131*  --  106*  --  107*   ALBUMIN  --   --   --   --   --  3.0*   PROTTOTAL  --   --   --   --   --  6.6*   BILITOTAL  --   --   --   --   --  0.7   ALKPHOS  --   --   --   --   --  71   ALT  --   --   --   --   --  32   AST  --   --   --   --   --  16    < > = values in this interval not displayed.       Recent Labs   Lab 02/19/22  0530 02/18/22 2050 02/18/22  1202   * 106* 107*        Unresulted Labs Ordered in the Past 30 Days of this Admission     Date and Time Order Name Status Description    2/18/2022  2:02 PM Other Laboratory; MBC; Reference Lab Testing (Laboratory Miscellaneous Order) In process            Imaging  Recent Results (from the past 24 hour(s))   XR Chest Port 1 View    Narrative    EXAM: XR CHEST PORT 1  VIEW  LOCATION: [facility]  DATE/TIME: 2/18/2022 5:47 PM    INDICATION: decreased sats after EGD  COMPARISON: 01/01/2022.      Impression    IMPRESSION: Enlarged heart. Calcified plaque of the aorta. Normal pulmonary vascularity. Mild atelectasis/scarring at the lung bases. Lungs otherwise clear. No pleural fluid or pneumothorax. Demineralized skeleton. Degenerative changes of the shoulders.        I reviewed all new labs and imaging results over the last 24 hours. I personally reviewed     Medications     dextrose       sodium chloride 75 mL/hr at 02/19/22 0600       brimonidine  1 drop Left Eye BID     dorzolamide-timolol  1 drop Left Eye BID     gabapentin  200 mg Per G Tube TID     latanoprost  1 drop Left Eye At Bedtime     levothyroxine  150 mcg Per G Tube QPM     [Held by provider] lisinopril  20 mg Per G Tube Daily     pantoprazole (PROTONIX) IV  40 mg Intravenous Q12H     QUEtiapine  12.5 mg Per G Tube BID     rosuvastatin  5 mg Per G Tube Daily     sertraline  100 mg Per G Tube Daily     sucralfate  1 g Per G Tube 4x Daily AC & HS       Ok Jauregui MD

## 2022-02-19 NOTE — PROGRESS NOTES
Patient wakes to voice.Still confused ,moans and restless when awaken. Continues on oxymask at  2L. Took off for 10 minutes and oxygen saturation dropped to 86%.placed back on , Will try later GT site clean,dry no leakage for the site. Unwrapped both legs for the compression hose. Skin in good condition underneath socks.Legs are +3 edema noted.B/P still running low.Bolus of fflds infusing now. Report to accepting nurse and patient transferred to  Med/surg at 1050.Daughter Víctor called and message left.

## 2022-02-19 NOTE — PROGRESS NOTES
BP 80/40's.  paged. No new orders. [facility] was also paged when writer had not heard from. Dr. Griffin.

## 2022-02-20 ENCOUNTER — APPOINTMENT (OUTPATIENT)
Dept: PHYSICAL THERAPY | Facility: CLINIC | Age: 87
DRG: 377 | End: 2022-02-20
Payer: MEDICARE

## 2022-02-20 ENCOUNTER — LAB REQUISITION (OUTPATIENT)
Dept: LAB | Facility: CLINIC | Age: 87
End: 2022-02-20
Payer: MEDICARE

## 2022-02-20 ENCOUNTER — APPOINTMENT (OUTPATIENT)
Dept: GENERAL RADIOLOGY | Facility: CLINIC | Age: 87
DRG: 377 | End: 2022-02-20
Attending: HOSPITALIST
Payer: MEDICARE

## 2022-02-20 DIAGNOSIS — D64.9 ANEMIA, UNSPECIFIED: ICD-10-CM

## 2022-02-20 LAB
ANION GAP SERPL CALCULATED.3IONS-SCNC: 3 MMOL/L (ref 3–14)
BUN SERPL-MCNC: 30 MG/DL (ref 7–30)
CALCIUM SERPL-MCNC: 7.9 MG/DL (ref 8.5–10.1)
CHLORIDE BLD-SCNC: 110 MMOL/L (ref 94–109)
CO2 SERPL-SCNC: 28 MMOL/L (ref 20–32)
CREAT SERPL-MCNC: 0.7 MG/DL (ref 0.52–1.04)
ERYTHROCYTE [DISTWIDTH] IN BLOOD BY AUTOMATED COUNT: 16.2 % (ref 10–15)
GFR SERPL CREATININE-BSD FRML MDRD: 82 ML/MIN/1.73M2
GLUCOSE BLD-MCNC: 122 MG/DL (ref 70–99)
GLUCOSE BLDC GLUCOMTR-MCNC: 108 MG/DL (ref 70–99)
GLUCOSE BLDC GLUCOMTR-MCNC: 124 MG/DL (ref 70–99)
GLUCOSE BLDC GLUCOMTR-MCNC: 82 MG/DL (ref 70–99)
HCT VFR BLD AUTO: 34.1 % (ref 35–47)
HGB BLD-MCNC: 10.5 G/DL (ref 11.7–15.7)
LACTATE SERPL-SCNC: 1.9 MMOL/L (ref 0.7–2)
MCH RBC QN AUTO: 33.5 PG (ref 26.5–33)
MCHC RBC AUTO-ENTMCNC: 30.8 G/DL (ref 31.5–36.5)
MCV RBC AUTO: 109 FL (ref 78–100)
NT-PROBNP SERPL-MCNC: 4603 PG/ML (ref 0–1800)
PLATELET # BLD AUTO: 211 10E3/UL (ref 150–450)
POTASSIUM BLD-SCNC: 4.1 MMOL/L (ref 3.4–5.3)
PROCALCITONIN SERPL-MCNC: 0.15 NG/ML
RBC # BLD AUTO: 3.13 10E6/UL (ref 3.8–5.2)
SODIUM SERPL-SCNC: 141 MMOL/L (ref 133–144)
WBC # BLD AUTO: 12.9 10E3/UL (ref 4–11)

## 2022-02-20 PROCEDURE — 250N000011 HC RX IP 250 OP 636: Performed by: HOSPITALIST

## 2022-02-20 PROCEDURE — 36415 COLL VENOUS BLD VENIPUNCTURE: CPT | Performed by: HOSPITALIST

## 2022-02-20 PROCEDURE — 83605 ASSAY OF LACTIC ACID: CPT | Performed by: HOSPITALIST

## 2022-02-20 PROCEDURE — 250N000013 HC RX MED GY IP 250 OP 250 PS 637: Performed by: INTERNAL MEDICINE

## 2022-02-20 PROCEDURE — 120N000001 HC R&B MED SURG/OB

## 2022-02-20 PROCEDURE — 83880 ASSAY OF NATRIURETIC PEPTIDE: CPT | Performed by: HOSPITALIST

## 2022-02-20 PROCEDURE — 99233 SBSQ HOSP IP/OBS HIGH 50: CPT | Performed by: HOSPITALIST

## 2022-02-20 PROCEDURE — 82310 ASSAY OF CALCIUM: CPT | Performed by: HOSPITALIST

## 2022-02-20 PROCEDURE — 85027 COMPLETE CBC AUTOMATED: CPT | Performed by: HOSPITALIST

## 2022-02-20 PROCEDURE — 97161 PT EVAL LOW COMPLEX 20 MIN: CPT | Mod: GP | Performed by: PHYSICAL THERAPIST

## 2022-02-20 PROCEDURE — 250N000011 HC RX IP 250 OP 636: Performed by: INTERNAL MEDICINE

## 2022-02-20 PROCEDURE — C9113 INJ PANTOPRAZOLE SODIUM, VIA: HCPCS | Performed by: INTERNAL MEDICINE

## 2022-02-20 PROCEDURE — 71045 X-RAY EXAM CHEST 1 VIEW: CPT

## 2022-02-20 PROCEDURE — 84145 PROCALCITONIN (PCT): CPT | Performed by: HOSPITALIST

## 2022-02-20 RX ORDER — FUROSEMIDE 10 MG/ML
40 INJECTION INTRAMUSCULAR; INTRAVENOUS ONCE
Status: COMPLETED | OUTPATIENT
Start: 2022-02-20 | End: 2022-02-20

## 2022-02-20 RX ADMIN — PANTOPRAZOLE SODIUM 40 MG: 40 INJECTION, POWDER, FOR SOLUTION INTRAVENOUS at 22:07

## 2022-02-20 RX ADMIN — SERTRALINE HYDROCHLORIDE 100 MG: 20 SOLUTION ORAL at 07:55

## 2022-02-20 RX ADMIN — DORZOLAMIDE HYDROCHLORIDE AND TIMOLOL MALEATE 1 DROP: 20; 5 SOLUTION OPHTHALMIC at 08:00

## 2022-02-20 RX ADMIN — QUETIAPINE 12.5 MG: 25 TABLET ORAL at 21:12

## 2022-02-20 RX ADMIN — BRIMONIDINE TARTRATE 1 DROP: 2 SOLUTION/ DROPS OPHTHALMIC at 21:52

## 2022-02-20 RX ADMIN — DORZOLAMIDE HYDROCHLORIDE AND TIMOLOL MALEATE 1 DROP: 20; 5 SOLUTION OPHTHALMIC at 21:51

## 2022-02-20 RX ADMIN — BRIMONIDINE TARTRATE 1 DROP: 2 SOLUTION/ DROPS OPHTHALMIC at 08:00

## 2022-02-20 RX ADMIN — ROSUVASTATIN CALCIUM 5 MG: 5 TABLET, FILM COATED ORAL at 07:55

## 2022-02-20 RX ADMIN — SUCRALFATE 1 G: 1 SUSPENSION ORAL at 06:08

## 2022-02-20 RX ADMIN — SUCRALFATE 1 G: 1 SUSPENSION ORAL at 15:59

## 2022-02-20 RX ADMIN — LATANOPROST 1 DROP: 50 SOLUTION OPHTHALMIC at 21:54

## 2022-02-20 RX ADMIN — GABAPENTIN 200 MG: 100 CAPSULE ORAL at 20:56

## 2022-02-20 RX ADMIN — HALOPERIDOL LACTATE 5 MG: 5 INJECTION, SOLUTION INTRAMUSCULAR at 19:23

## 2022-02-20 RX ADMIN — SUCRALFATE 1 G: 1 SUSPENSION ORAL at 10:46

## 2022-02-20 RX ADMIN — LEVOTHYROXINE SODIUM 150 MCG: 0.15 TABLET ORAL at 16:47

## 2022-02-20 RX ADMIN — SUCRALFATE 1 G: 1 SUSPENSION ORAL at 22:12

## 2022-02-20 RX ADMIN — HALOPERIDOL LACTATE 5 MG: 5 INJECTION, SOLUTION INTRAMUSCULAR at 12:54

## 2022-02-20 RX ADMIN — FUROSEMIDE 40 MG: 10 INJECTION INTRAMUSCULAR; INTRAVENOUS at 15:58

## 2022-02-20 RX ADMIN — FUROSEMIDE 40 MG: 10 INJECTION INTRAMUSCULAR; INTRAVENOUS at 22:47

## 2022-02-20 RX ADMIN — PANTOPRAZOLE SODIUM 40 MG: 40 INJECTION, POWDER, FOR SOLUTION INTRAVENOUS at 07:55

## 2022-02-20 ASSESSMENT — ACTIVITIES OF DAILY LIVING (ADL)
ADLS_ACUITY_SCORE: 31
ADLS_ACUITY_SCORE: 33
ADLS_ACUITY_SCORE: 31
ADLS_ACUITY_SCORE: 31
ADLS_ACUITY_SCORE: 29
ADLS_ACUITY_SCORE: 31
ADLS_ACUITY_SCORE: 33
ADLS_ACUITY_SCORE: 31
ADLS_ACUITY_SCORE: 33
ADLS_ACUITY_SCORE: 31
ADLS_ACUITY_SCORE: 33
ADLS_ACUITY_SCORE: 31
ADLS_ACUITY_SCORE: 31
ADLS_ACUITY_SCORE: 33

## 2022-02-20 NOTE — PLAN OF CARE
DATE & TIME: 2/20 Night    Cognitive Concerns/ Orientation : Alert but confused, oriented to self    BEHAVIOR & AGGRESSION TOOL COLOR: Green, anxious   ABNL VS/O2: VSS on 4 L oxymask   MOBILITY: Lift, repositioned   PAIN MANAGEMENT: no complaints of pain   DIET: TF, pureed diet with moderately thick liquids  BOWEL/BLADDER: Incontinent of urine. Purewick in place. Last BM unknown  ABNL LAB/BG: BGM 82, 122  DRAIN/DEVICES: PIV SL, PEG, Purewick   TELEMETRY RHYTHM: Afib   SKIN: pale   TESTS/PROCEDURES: n/a   D/C DATE: pending  OTHER IMPORTANT INFO: Increased TF to 30 at 0200 and increased to goal rate of 40/hr at 0600.  Patient sleepy at start of shift and progressively more anxious as she became more awake throughout the night. Frequently calling out and pulling off oxymask. Lung sounds rhonchi.

## 2022-02-20 NOTE — PLAN OF CARE
Tube feeding increased to 20 mL/hr per order (increase by increments on 10 mL/hr q4h until goal of 40 mL/hr is met). Pt is tolerating tube feeding well.

## 2022-02-20 NOTE — PROGRESS NOTES
"Assumed cares [id number]. NAVA full orientation. D/Ox3. Pt extremely anxious, stating she wanted to \"get up and go home.\" Pulling off oxygen mask with O2 sats dropping to low 80s when on room air. Distraction with television movie moderately successful to start. As pt disorientation and anxiety increase, PRN haldol given. Pt calmed down.      Daughter came to visit, expressing support of med intervention stating she didn't want her mother to be anxious.  Dtr worked at AL facility pt was at before TCU. Update was given to dtr and granddaughter (on speaker call).    O2 decreased to 1.5L with O2 sats staying in mid 90s. BL upper lobs rhonchi. Gurgling evident with inspiratory breaths. Pt reported 1 episode of dizziness while resting after awakening.     Continuous feed @ goal rate of 40 ml/hr.     Pt total cares. Lift. Purewick and brief in place.     BP (!) 142/55 (BP Location: Left arm)   Pulse 75   Temp 99.6  F (37.6  C) (Axillary)   Resp 20   SpO2 94%   There is no height or weight on file to calculate BMI.  "

## 2022-02-20 NOTE — PLAN OF CARE
"Patient disoriented x 4. Anxious, tearful stating \"I don't want to go there alone!\", cries out \"help me!\" multiple times.  Needs a lot of redirection and support. Total cares, lift device. Purewick in place.   Weaned to 2 L oxymask. Sats dip to low mid 80's on RA. LS: rhonchi. Sounds \"wet, gurgly\". Diet: pureed; level 3 liquids.  Continuous feed @ goal rate of 40 ml/hr. 130 ml water flush Q 6 hrs. Dressing clean, dry, intact.  Patient states she feels \"dizzy\" at times. VSS. BP (!) 150/69 (BP Location: Left arm, Patient Position: Supine)   Pulse 83   Temp 98.8  F (37.1  C) (Axillary)   Resp 20   SpO2 98%     Plan: pending placement  Darling Roberson RN on 2/20/2022 at 10:13 AM      Sepsis alert triggered: tachypnea 26 RR and WBC 12.6. LA order placed.  Darling Roberson RN on 2/20/2022 at 11:03 AM    "

## 2022-02-20 NOTE — PROVIDER NOTIFICATION
MD Notification    Notified Person: Tele med MD    Notified Person Name: Mauricio Freitas    Notification Date/Time: 2/20/22 at 0000    Notification Interaction: paged    Purpose of Notification: Patient has not had Hgb check since noon at which time it was 8.7 but has had a significant drop over the last 24 hours. Would midnight Hgb be prudent.     Orders Received: No new orders unless patient is symptomatic.     Comments: Orders already in for AM CBC. Will monitor for s/s of active bleeding

## 2022-02-20 NOTE — CONSULTS
Pharmacy consulted to check medications for compatibility with tube feeds.   All current ordered meds are compatible with tube feeds. Pharmacy will continue to monitor for new medications if ordered.  Thank you,  Ashley Schoen PharmD

## 2022-02-20 NOTE — PROGRESS NOTES
Patient alert to self. She is confused and repeatedly asks what happened to her and where she is. Patient has Gtube and is receiving tube feeding. Patient does not like to be moved as she states it hurts her. When at rest, pain is denied. Patient did not get out of bed but has been repositioned every 2-3 hours. Purewick in place. Patient's extremities are very edematous. Patient is currently resting.       /54 (BP Location: Left arm)   Pulse 75   Temp 98.1  F (36.7  C) (Axillary)   Resp 18   SpO2 97%

## 2022-02-20 NOTE — PROGRESS NOTES
"[facility] Medicine Progress Note  Date of Service: 02/20/2022    Assessment & Plan          Jamia Coley is a 89 year old female admitted on 2/18/2022.  Patient with dementia who has resided in assisted living facility.  Unfortunately she had a infarct of the central/lateral clark in early January of this year.  This left her dependent for all ADLs and unable to ambulate initially.  Due to dysphagia, a feeding tube was placed and patient was discharged to TCU where she has been recovering since.  Her feeding tube was not functioning and so this was replaced 2/17 and then she presented to the hospital 2/18 with bleeding from the gastrostomy site.    PEG tube site bleeding  Acute blood loss anemia  PEG tube was initially placed on 1/12/2022, but was replaced on 2/17/2022 due to tube clogging.  Hemoglobin decreased from 12.5-10.9 in the emergency department.  On January 28, 2022 hemoglobin was 13.2.  EGD on 2/18/2022 with \"Bleeding ulcer adjacent to intact gastrostomy tube.  This was controlled with 7mL of 1:10,000 epinephrine, 2 MRI conditional hemostatic clips, electrocautery\":  -Continue IV Protonix and Carafate.    Possible aspiration  Acute hypoxic respiratory failure  In the PACU 2/18, patient was requiring increased suctioning.  She was admitted to the intensive care unit overnight due to increased care needs.  Antibiotics were not initiated.  Chest x-ray without any evidence of infiltrate.  Oxygen was able to be weaned off by the afternoon of 2/19/2022.  However, she then had worsening oxygenation requirements that evening up to 4 L.  My suspicion is that this is secondary to volume replacement triggering a CHF exacerbation.  She has developed a leukocytosis of 12.92/20 but this is after receiving steroids evening of 2/18 while in the operating room.  -Diuresis as outlined below under CHF  -Check chest x-ray and procalcitonin; will consider antibiotics if these are " "abnormal    Acute on chronic systolic heart failure  Hypertension  Now hypotension  Patient was hypotensive morning of  secondary to her ACE inhibitor in the setting of hypovolemia from her bleeding.  She was given 500 cc LR bolus and this ameliorated her hypotension.  However she is now developing evidence of hypervolemia with bilateral rhonchi and worsening peripheral edema and acute hypoxia up to 4 L O2 as outlined above.  At the time of her stroke, patient was diagnosed with systolic heart failure with a newly low EF of 25% with global hypokinesis of the LV noted 1/3/2022.  The etiology of her cardiomyopathy was not identified.  She has been maintained on furosemide 40 mg once daily, this was held on admission due to hypotension as above.  -Check BNP  -Furosemide 40 mg IV x1  -She is not on a beta-blocker, unclear why not  -Hold her lisinopril given hypotension requiring IV fluid bolus   -Given that she will be hospitalized here tomorrow we will check an echocardiogram.  I do not anticipate this will  much but it will impact her prognosis if her EF is not improving.  -Could consider outpatient follow-up with cardiology for further investigation of cardiomyopathy if family desires.    ADDENDUM: BNP  4603. Give a 2nd ose of furosemide tonight. Hold on antibiotics untess procal returns elevated.     History of CVA  Right hemiparesis  The patient is dependent for all ADLs and transfers.  Per daughter, she recently ambulated 8 feet at the transitional care.  Prior to her stroke in January, she was ambulatory and physically functional although had significant dementia.    Goals of care  Discussed with kayce Renee.  She wants Jamia to be comfortable.  She says that Jamia has had multiple family members who had fatal strokes but they all  right away.  She says she would be happy if Jamia fell asleep and did not wake up again, but she does not want to treat Jamia \"like an " "animal and drop her off the vet.\"     After the stroke, the patient's daughter elected to proceed with feeding tube placement.  (There is a palliative care note from her hospitalization at [facility] on 1/3/2022 that seems to imply that they had advised against feeding tube placement.) The patient has had a slow rehab progress at [facility], but has been able to ambulate a few feet on her own recently.   She is DNR/DNI.    -Will request palliative care consultation, Víctor is really struggling watching her mom's slow progress since the stroke.    Dementia with behavioral dyscontrol  Depression  Anxiety  While at [facility], she had debilitating anxiety which she had prior to stroke but seems to have worsened since then.  Seem to be more calm yesterday and so her scheduled gabapentin and Seroquel were held to evaluate avoid oversedation but then significantly anxious today, requiring Haldol as needed.  - Continue prior to admission sertraline.    -Continue as needed IV and IM Haldol.  --Resume scheduled gabapentin, Seroquel    Dysphagia  Per daughter patient has actually been eating up to 50 to 50% of her meals as of late but she remains on tube feeds at this time.  -Will reinitiate tube feeding with her home routine Jevity 1.5 Kiet continuous feeding at 40 cc/hr (will start with 10 cc an hour and advance by 10 every 4 hours until goal), as well as a dysphagia 1 pureed diet with honey thick liquids and free water flushes 130cc every 6 hours.       Atrial fibrillation  The patient is not on rate control medication.  -Hold prior to admission apixaban due to bleeding.    Hypothyroidism  Continue prior to admission levothyroxine.    Glaucoma  Continue prior to admission Alphagan, Cosopt, and Xalatan.      Diet: Adult Formula Drip Feeding: Continuous Jevity 1.5; Gastrostomy; Goal Rate: 40; mL/hr; Medication - Feeding Tube Flush Frequency: At least 15-30 mL water before and after medication administration and with tube " clogging; Initiate at 10 cc/hr and in...  Pureed Diet (level 4) Liquidized/Moderately Thick (level 3)    DVT Prophylaxis: Contraindicated secondary to bleeding  Richardson Catheter: Not present  Central Lines: None  Code Status: No CPR- Do NOT Intubate           Discussion: Now dealing with some hypoxia.    Disposition: Anticipate discharge 1 to 2 days    Attestation:  I have reviewed today's vital signs, notes, medications, labs and imaging.    Ok Jauregui MD       Interval History   Patient requiring some oxygen    Physical Exam   Temp:  [98.1  F (36.7  C)-99.6  F (37.6  C)] 99.6  F (37.6  C)  Pulse:  [70-88] 75  Resp:  [18-26] 20  BP: (106-152)/(45-70) 142/55  SpO2:  [56 %-100 %] 94 %    Weights: There were no vitals filed for this visit. There is no height or weight on file to calculate BMI.    Constitutional: Anxious appearing elderly female  CV: Regular  Respiratory: Wet rhonchorous with diminished sounds at the bases  GI: Soft, nontender.  Gastrostomy in place without any surrounding bleeding.  Skin: Warm and dry  Musculoskeletal: Trace lower extremity edema.    Data   Recent Labs   Lab 02/20/22  1437 02/20/22  1036 02/20/22  0514 02/19/22  2235 02/19/22  1221 02/19/22  0530 02/18/22  1516 02/18/22  1202   WBC  --   --  12.9*  --   --  10.4  --  9.6   HGB  --   --  10.5*  --  8.7* 9.6*  9.6*   < > 12.5   MCV  --   --  109*  --   --  103*  --  105*   PLT  --   --  211  --   --  197  --  238   INR  --   --   --   --   --   --   --  1.27*   NA  --   --  141  --   --  142  --  138   POTASSIUM  --   --  4.1  --   --  4.1  --  4.4   CHLORIDE  --   --  110*  --   --  111*  --  105   CO2  --   --  28  --   --  28  --  28   BUN  --   --  30  --   --  36*  --  41*   CR  --   --  0.70  --   --  0.67  --  0.63   ANIONGAP  --   --  3  --   --  3  --  5   KYLE  --   --  7.9*  --   --  7.7*  --  8.9   * 124* 122*   < >  --  131*   < > 107*   ALBUMIN  --   --   --   --   --   --   --  3.0*   PROTTOTAL  --   --   --    --   --   --   --  6.6*   BILITOTAL  --   --   --   --   --   --   --  0.7   ALKPHOS  --   --   --   --   --   --   --  71   ALT  --   --   --   --   --   --   --  32   AST  --   --   --   --   --   --   --  16    < > = values in this interval not displayed.       Recent Labs   Lab 02/20/22  1437 02/20/22  1036 02/20/22  0514 02/20/22  0215 02/19/22  2235 02/19/22  0530   * 124* 122* 82 108* 131*        Unresulted Labs Ordered in the Past 30 Days of this Admission     Date and Time Order Name Status Description    2/18/2022  2:02 PM Other Laboratory; [facility] [place] Lab Testing (Laboratory Miscellaneous Order) In process            Imaging  No results found for this or any previous visit (from the past 24 hour(s)).     I reviewed all new labs and imaging results over the last 24 hours. I personally reviewed     Medications       brimonidine  1 drop Left Eye BID     dorzolamide-timolol  1 drop Left Eye BID     furosemide  40 mg Intravenous Once     [Held by provider] gabapentin  200 mg Per G Tube TID     latanoprost  1 drop Left Eye At Bedtime     levothyroxine  150 mcg Per G Tube QPM     [Held by provider] lisinopril  20 mg Per G Tube Daily     pantoprazole (PROTONIX) IV  40 mg Intravenous Q12H     [Held by provider] QUEtiapine  12.5 mg Per G Tube BID     rosuvastatin  5 mg Per G Tube Daily     sertraline  100 mg Per G Tube Daily     sucralfate  1 g Per G Tube 4x Daily AC & HS       Ok Jauregui MD

## 2022-02-20 NOTE — PROGRESS NOTES
02/20/22 0834   Quick Adds   Type of Visit Initial PT Evaluation   Living Environment   Living Environment Comments pt unable to give info due to poor memory (she says she lives alone in a house), brought in from TCU, per notes was at [facility] and ambulation with cues for re-direction before had a stroke   Self-Care   Current Activity Tolerance fair   Activity/Exercise/Self-Care Comment per SW note pt was working with therapy at [facility] and family state she had ambulated 8 ft   General Information   Onset of Illness/Injury or Date of Surgery 02/19/22   Referring Physician Ok Jauregui MD   Patient/Family Therapy Goals Statement (PT) unable to state goals   Pertinent History of Current Problem (include personal factors and/or comorbidities that impact the POC) had L pontine and R occipital stroke recently, was at TCU, had Gtube placed and was noted to have blood in it and was sent in for evaluation for GIB, Hgb today 10.5, was 8.7 yesterday, pt with dementia and anxiety   Existing Precautions/Restrictions fall   General Observations when therapist arrived pt was yelling out help me and had taken her oxymask off and Spo2 was 78%, reassurance given, pt asking where she is and where she is being taken every couple of minutes, redirects but then gets anxious again and keeps asking, no family present   Cognition   Affect/Mental Status (Cognition) anxious;confused   Orientation Status (Cognition) oriented to;person   Cognitive Status Comments conts to remove her oxymask during the session   Integumentary/Edema   Integumentary/Edema Comments R UE edema   Range of Motion (ROM)   ROM Comment DF to neutral with PROM   Strength (Manual Muscle Testing)   Strength (Manual Muscle Testing) Deficits observed during functional mobility   Strength Comments unable to do indep SLR but once EOB moves L for LAQ indep but R needs assist, does not move R LE or R UE with just verbal cues, needs tactile cues and at times assist to  initiate the movement, R hand wiggles fingers and lifts hand off her thigh but not AROm for shoulder movement   Bed Mobility   Comment, (Bed Mobility) able to move legs towards EOB with just promtping, then needed mod A to fully get them off the bed, able to help bring her trunk up but needed mod A to get trunk to sitting position, holds self up min A x 1   Transfers   Comment, (Transfers) once EOB pt with increasing agitation and not easily directed, worried we are taking her somewhere, max A x 1 sit to supine and Ax2 to boost up in bed   Balance   Balance Comments min A sitting balance   Sensory Examination   Sensory Perception Comments once sitting reports feet are tingling    Clinical Impression   Criteria for Skilled Therapeutic Intervention Yes, treatment indicated   PT Diagnosis (PT) impaired functional mobility   Influenced by the following impairments weakness, impaired balance   Functional limitations due to impairments impaired bed mobility and functional mobility   Clinical Presentation (PT Evaluation Complexity) Evolving/Changing   Clinical Presentation Rationale clinical judgement   Clinical Decision Making (Complexity) low complexity   Planned Therapy Interventions (PT) balance training;bed mobility training;gait training;ROM (range of motion);strengthening;transfer training   Risk & Benefits of therapy have been explained evaluation/treatment results reviewed;care plan/treatment goals reviewed;risks/benefits reviewed;current/potential barriers reviewed;patient;participants voiced agreement with care plan   PT Discharge Planning   PT Discharge Recommendation (DC Rec) Transitional Care Facility   PT Rationale for DC Rec mod A x 1 to sit up, was previously walking prior to recent stroke, was doing TCU and per notes family report she was making progress, unsure how much more progress will be made but [facility]   PT Brief overview  of current status anxious and needs frequent redirection, sat up with Ax1 but more agitated upon sitting so did not try standing, has R weakness   Plan of Care Review   Plan of Care Reviewed With patient   Total Evaluation Time   Total Evaluation Time (Minutes) 15   Physical Therapy Goals   PT Frequency 5x/week   PT Predicated Duration/Target Date for Goal Attainment 02/27/22   PT Goals Bed Mobility;Transfers   PT: Bed Mobility Minimal assist;Supine to/from sit   PT: Transfers Moderate assist;Bed to/from chair;Sit to/from stand;Assistive device

## 2022-02-21 ENCOUNTER — APPOINTMENT (OUTPATIENT)
Dept: GENERAL RADIOLOGY | Facility: CLINIC | Age: 87
DRG: 377 | End: 2022-02-21
Attending: INTERNAL MEDICINE
Payer: MEDICARE

## 2022-02-21 ENCOUNTER — APPOINTMENT (OUTPATIENT)
Dept: CARDIOLOGY | Facility: CLINIC | Age: 87
DRG: 377 | End: 2022-02-21
Attending: HOSPITALIST
Payer: MEDICARE

## 2022-02-21 ENCOUNTER — APPOINTMENT (OUTPATIENT)
Dept: PHYSICAL THERAPY | Facility: CLINIC | Age: 87
DRG: 377 | End: 2022-02-21
Payer: MEDICARE

## 2022-02-21 PROBLEM — I42.9 CARDIOMYOPATHY (H): Chronic | Status: ACTIVE | Noted: 2022-01-03

## 2022-02-21 PROBLEM — Z86.73 HISTORY OF CVA (CEREBROVASCULAR ACCIDENT): Chronic | Status: ACTIVE | Noted: 2022-01-03

## 2022-02-21 PROBLEM — I42.9 CARDIOMYOPATHY (H): Chronic | Status: RESOLVED | Noted: 2022-01-03 | Resolved: 2022-02-21

## 2022-02-21 PROBLEM — I42.9 CARDIOMYOPATHY (H): Chronic | Status: ACTIVE | Noted: 2022-02-21

## 2022-02-21 PROBLEM — F32.A DEPRESSION: Chronic | Status: ACTIVE | Noted: 2020-10-09

## 2022-02-21 PROBLEM — R13.10 DYSPHAGIA: Status: ACTIVE | Noted: 2022-02-21

## 2022-02-21 PROBLEM — R13.10 DYSPHAGIA: Chronic | Status: ACTIVE | Noted: 2022-02-21

## 2022-02-21 PROBLEM — F03.90 DEMENTIA (H): Chronic | Status: ACTIVE | Noted: 2022-01-10

## 2022-02-21 PROBLEM — U07.1 COVID-19: Status: RESOLVED | Noted: 2020-11-27 | Resolved: 2022-02-21

## 2022-02-21 PROBLEM — M81.0 OSTEOPOROSIS: Chronic | Status: ACTIVE | Noted: 2022-01-10

## 2022-02-21 LAB
ANION GAP SERPL CALCULATED.3IONS-SCNC: 1 MMOL/L (ref 3–14)
BUN SERPL-MCNC: 18 MG/DL (ref 7–30)
CALCIUM SERPL-MCNC: 7.4 MG/DL (ref 8.5–10.1)
CHLORIDE BLD-SCNC: 105 MMOL/L (ref 94–109)
CO2 SERPL-SCNC: 36 MMOL/L (ref 20–32)
CREAT SERPL-MCNC: 0.6 MG/DL (ref 0.52–1.04)
ERYTHROCYTE [DISTWIDTH] IN BLOOD BY AUTOMATED COUNT: 15.8 % (ref 10–15)
GFR SERPL CREATININE-BSD FRML MDRD: 85 ML/MIN/1.73M2
GLUCOSE BLD-MCNC: 107 MG/DL (ref 70–99)
GLUCOSE BLDC GLUCOMTR-MCNC: 100 MG/DL (ref 70–99)
GLUCOSE BLDC GLUCOMTR-MCNC: 140 MG/DL (ref 70–99)
HCT VFR BLD AUTO: 29.7 % (ref 35–47)
HGB BLD-MCNC: 9.2 G/DL (ref 11.7–15.7)
LVEF ECHO: NORMAL
MAGNESIUM SERPL-MCNC: 1.9 MG/DL (ref 1.8–2.6)
MCH RBC QN AUTO: 32.9 PG (ref 26.5–33)
MCHC RBC AUTO-ENTMCNC: 31 G/DL (ref 31.5–36.5)
MCV RBC AUTO: 106 FL (ref 78–100)
PLATELET # BLD AUTO: 183 10E3/UL (ref 150–450)
POTASSIUM BLD-SCNC: 3.3 MMOL/L (ref 3.4–5.3)
POTASSIUM BLD-SCNC: 3.4 MMOL/L (ref 3.4–5.3)
POTASSIUM BLD-SCNC: 3.8 MMOL/L (ref 3.4–5.3)
RBC # BLD AUTO: 2.8 10E6/UL (ref 3.8–5.2)
SODIUM SERPL-SCNC: 142 MMOL/L (ref 133–144)
T4 FREE SERPL-MCNC: 0.96 NG/DL (ref 0.76–1.46)
TSH SERPL DL<=0.005 MIU/L-ACNC: 4.39 MU/L (ref 0.4–4)
WBC # BLD AUTO: 8.1 10E3/UL (ref 4–11)

## 2022-02-21 PROCEDURE — 99232 SBSQ HOSP IP/OBS MODERATE 35: CPT | Performed by: INTERNAL MEDICINE

## 2022-02-21 PROCEDURE — 250N000013 HC RX MED GY IP 250 OP 250 PS 637: Performed by: INTERNAL MEDICINE

## 2022-02-21 PROCEDURE — 85027 COMPLETE CBC AUTOMATED: CPT | Performed by: HOSPITALIST

## 2022-02-21 PROCEDURE — 250N000011 HC RX IP 250 OP 636: Performed by: INTERNAL MEDICINE

## 2022-02-21 PROCEDURE — 93306 TTE W/DOPPLER COMPLETE: CPT

## 2022-02-21 PROCEDURE — 99223 1ST HOSP IP/OBS HIGH 75: CPT | Performed by: FAMILY MEDICINE

## 2022-02-21 PROCEDURE — 80048 BASIC METABOLIC PNL TOTAL CA: CPT | Performed by: HOSPITALIST

## 2022-02-21 PROCEDURE — C9113 INJ PANTOPRAZOLE SODIUM, VIA: HCPCS | Performed by: INTERNAL MEDICINE

## 2022-02-21 PROCEDURE — 97110 THERAPEUTIC EXERCISES: CPT | Mod: GP | Performed by: PHYSICAL THERAPIST

## 2022-02-21 PROCEDURE — 71045 X-RAY EXAM CHEST 1 VIEW: CPT

## 2022-02-21 PROCEDURE — 84439 ASSAY OF FREE THYROXINE: CPT | Performed by: INTERNAL MEDICINE

## 2022-02-21 PROCEDURE — 36415 COLL VENOUS BLD VENIPUNCTURE: CPT | Performed by: HOSPITALIST

## 2022-02-21 PROCEDURE — 120N000001 HC R&B MED SURG/OB

## 2022-02-21 PROCEDURE — 83735 ASSAY OF MAGNESIUM: CPT | Performed by: INTERNAL MEDICINE

## 2022-02-21 PROCEDURE — 84443 ASSAY THYROID STIM HORMONE: CPT | Performed by: INTERNAL MEDICINE

## 2022-02-21 PROCEDURE — 84132 ASSAY OF SERUM POTASSIUM: CPT | Performed by: INTERNAL MEDICINE

## 2022-02-21 PROCEDURE — 36415 COLL VENOUS BLD VENIPUNCTURE: CPT | Performed by: INTERNAL MEDICINE

## 2022-02-21 PROCEDURE — 93306 TTE W/DOPPLER COMPLETE: CPT | Mod: 26 | Performed by: INTERNAL MEDICINE

## 2022-02-21 RX ORDER — POTASSIUM CHLORIDE 1500 MG/1
40 TABLET, EXTENDED RELEASE ORAL ONCE
Status: COMPLETED | OUTPATIENT
Start: 2022-02-21 | End: 2022-02-21

## 2022-02-21 RX ORDER — FUROSEMIDE 10 MG/ML
40 INJECTION INTRAMUSCULAR; INTRAVENOUS ONCE
Status: COMPLETED | OUTPATIENT
Start: 2022-02-21 | End: 2022-02-21

## 2022-02-21 RX ORDER — POTASSIUM CHLORIDE 20MEQ/15ML
40 LIQUID (ML) ORAL ONCE
Status: COMPLETED | OUTPATIENT
Start: 2022-02-21 | End: 2022-02-21

## 2022-02-21 RX ADMIN — POTASSIUM CHLORIDE 40 MEQ: 20 TABLET, EXTENDED RELEASE ORAL at 06:25

## 2022-02-21 RX ADMIN — QUETIAPINE 12.5 MG: 25 TABLET ORAL at 20:48

## 2022-02-21 RX ADMIN — SUCRALFATE 1 G: 1 SUSPENSION ORAL at 06:25

## 2022-02-21 RX ADMIN — GABAPENTIN 200 MG: 100 CAPSULE ORAL at 20:48

## 2022-02-21 RX ADMIN — LEVOTHYROXINE SODIUM 150 MCG: 0.15 TABLET ORAL at 17:03

## 2022-02-21 RX ADMIN — POTASSIUM CHLORIDE 40 MEQ: 1.5 SOLUTION ORAL at 13:42

## 2022-02-21 RX ADMIN — GABAPENTIN 200 MG: 100 CAPSULE ORAL at 13:41

## 2022-02-21 RX ADMIN — GABAPENTIN 200 MG: 100 CAPSULE ORAL at 07:49

## 2022-02-21 RX ADMIN — SERTRALINE HYDROCHLORIDE 100 MG: 20 SOLUTION ORAL at 07:49

## 2022-02-21 RX ADMIN — PANTOPRAZOLE SODIUM 40 MG: 40 INJECTION, POWDER, FOR SOLUTION INTRAVENOUS at 20:46

## 2022-02-21 RX ADMIN — ROSUVASTATIN CALCIUM 5 MG: 5 TABLET, FILM COATED ORAL at 07:49

## 2022-02-21 RX ADMIN — QUETIAPINE 12.5 MG: 25 TABLET ORAL at 07:49

## 2022-02-21 RX ADMIN — DORZOLAMIDE HYDROCHLORIDE AND TIMOLOL MALEATE 1 DROP: 20; 5 SOLUTION OPHTHALMIC at 20:55

## 2022-02-21 RX ADMIN — SUCRALFATE 1 G: 1 SUSPENSION ORAL at 22:32

## 2022-02-21 RX ADMIN — SUCRALFATE 1 G: 1 SUSPENSION ORAL at 17:01

## 2022-02-21 RX ADMIN — FUROSEMIDE 40 MG: 10 INJECTION, SOLUTION INTRAMUSCULAR; INTRAVENOUS at 14:52

## 2022-02-21 RX ADMIN — LATANOPROST 1 DROP: 50 SOLUTION OPHTHALMIC at 22:35

## 2022-02-21 RX ADMIN — PANTOPRAZOLE SODIUM 40 MG: 40 INJECTION, POWDER, FOR SOLUTION INTRAVENOUS at 07:50

## 2022-02-21 RX ADMIN — SUCRALFATE 1 G: 1 SUSPENSION ORAL at 11:06

## 2022-02-21 ASSESSMENT — ACTIVITIES OF DAILY LIVING (ADL)
ADLS_ACUITY_SCORE: 33
ADLS_ACUITY_SCORE: 29
ADLS_ACUITY_SCORE: 39
ADLS_ACUITY_SCORE: 35
ADLS_ACUITY_SCORE: 39
ADLS_ACUITY_SCORE: 39
ADLS_ACUITY_SCORE: 35
ADLS_ACUITY_SCORE: 33
ADLS_ACUITY_SCORE: 39
ADLS_ACUITY_SCORE: 35
ADLS_ACUITY_SCORE: 33
ADLS_ACUITY_SCORE: 35
ADLS_ACUITY_SCORE: 39
ADLS_ACUITY_SCORE: 39
ADLS_ACUITY_SCORE: 29
ADLS_ACUITY_SCORE: 35
ADLS_ACUITY_SCORE: 39
ADLS_ACUITY_SCORE: 39
ADLS_ACUITY_SCORE: 33
ADLS_ACUITY_SCORE: 39
ADLS_ACUITY_SCORE: 33
ADLS_ACUITY_SCORE: 39
ADLS_ACUITY_SCORE: 35
ADLS_ACUITY_SCORE: 39

## 2022-02-21 NOTE — PROGRESS NOTES
Patient did well as she was up to chair most of the morning and anxiety was much improved. Daughter will visit this afternoon. Butler monitoring put in place after 1:1 discontinued, but does need increased supervision due to removing NC from nostrils and she also picks at her gastrostomy tube dressings.  Patient remains disoriented and has been upset about not being able to move. She worked well with PT.  Vitals stable, but she is requiring 1-2L per NC and desats to approx 75% on room air.   Tube feedings continue at 40 mL/hr with 130 free water flushes Q3H. She is able to eat per dysphagia diet, but has poor appetite. She did attempt to feed self when tray was set up for breakfast.   Purewic in place with malodorous, but clear urine. No BM this shift.   Skin intact; mepilex to coccyx and bilat thighs.   Potassium replaced for the second time today; lab recheck ordered for 1730 tonight per replacement protocol.

## 2022-02-21 NOTE — PROGRESS NOTES
"[facility]    Medicine Progress Note - Hospitalist Service    Date of Admission:  2/18/2022    Assessment & Plan            Jamia Coley is a 89 year old female admitted on 2/18/2022.  Patient with dementia who has resided in assisted living facility.  Unfortunately she had a infarct of the central/lateral clark in early January of this year.  This left her dependent for all ADLs and unable to ambulate initially.  Due to dysphagia, a feeding tube was placed and patient was discharged to TCU where she has been recovering since.  Her feeding tube was not functioning and so this was replaced 2/17 and then she presented to the hospital 2/18 with bleeding from the gastrostomy site.    PEG tube site bleeding  Acute blood loss anemia  PEG tube was initially placed on 1/12/2022, but was replaced on 2/17/2022 due to tube clogging.  Hemoglobin decreased from 12.5-10.9 in the emergency department.  On January 28, 2022 hemoglobin was 13.2.  EGD on 2/18/2022 with \"Bleeding ulcer adjacent to intact gastrostomy tube.  This was controlled with 7mL of 1:10,000 epinephrine, 2 MRI conditional hemostatic clips, electrocautery\":  -Continue IV Protonix and Carafate.    Possible aspiration  Acute hypoxic respiratory failure 2/18/22  In the PACU 2/18, patient was requiring increased suctioning.  She was admitted to the intensive care unit overnight due to increased care needs.  Antibiotics were not initiated.  Chest x-ray without any evidence of infiltrate.  Oxygen was able to be weaned off by the afternoon of 2/19/2022.  However, she then had worsening oxygenation requirements that evening up to 4 LPM.  Suspicion is that this is secondary to volume replacement triggering a CHF exacerbation.  She developed a leukocytosis of 12.9 2/20 but this is after receiving steroids evening of 2/18 while in the operating room.  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  She was given 500 cc LR bolus and this ameliorated her hypotension.  However 2/20/2022 developed evidence of hypervolemia with bilateral rhonchi and worsening peripheral edema and acute hypoxia up to 4 L O2 as outlined above.  At the time of her stroke, patient was diagnosed with systolic heart failure with a newly low EF of 25% with global hypokinesis of the LV noted 1/3/2022.  The etiology of her cardiomyopathy was not identified.  She has been maintained on furosemide 40 mg once daily, this was held on admission due to hypotension as above.  BNP  4603. Procalcitonin 0.15.  Recceived Furosemide 40 mg IV x2 2/21/2022. Held her lisinopril given hypotension requiring IV fluid bolus 2/19. Repeat Echo 2/21/2022 with normalized EF    O2 needs improved to 2 LPM, still requiring O2, weight appears decreased, BPs are stable  She is not on a beta-blocker   - Continue to hold usual Lisinopril  20  - Continue to hold usual Lasix 40  - Give lasix 40 IVx1   - No CXR done yesterday, ordered   - I/O, daily weights    History of CVA  Right hemiparesis  The patient is dependent for all ADLs and transfers.  Per daughter, she recently ambulated 8 feet at the transitional care.  Prior to her stroke in January, she was ambulatory and physically functional although had significant dementia.    Dementia with behavioral issues  Depression  Anxiety  While at TCU, she had debilitating anxiety which she had prior to stroke but seems to have worsened since then.  Seem to be more calm 2/19/2022 and so her scheduled gabapentin and Seroquel were held to evaluate avoid oversedation but then significantly anxious 2/20/2022, requiring Haldol as needed.  - Continue prior to admission sertraline.    - Continue as needed IV and IM Haldol.  - Resume scheduled  "gabapentin, Seroquel    Dysphagia  Per daughter patient has actually been eating up to 50 to 50% of her meals as of late but she remains on tube feeds at this time.  - Reinitiated tube feeding with her home routine Jevity 1.5 Kiet continuous feeding at 40 cc/hr (will start with 10 cc an hour and advance by 10 every 4 hours until goal), as well as a dysphagia 1 pureed diet with honey thick liquids and free water flushes 130cc every 6 hours.     - Speech consult    Atrial fibrillation  Patient is in atrial fibrillation by EKG on admission and echo 2022   The patient is not on rate control medication, rate is controlled.   -Hold prior to admission apixaban due to bleeding.    Hypothyroidism  Continue prior to admission levothyroxine.  - Check TSH    Glaucoma  Continue prior to admission Alphagan, Cosopt, and Xalatan.    Goals of care  MD discussed with daughter Víctor 2022.  She wants Jamia to be comfortable.  She says that Jamia has had multiple family members who had fatal strokes but they all  right away.  She says she would be happy if Jamia fell asleep and did not wake up again, but she does not want to treat Jamia \"like an animal and drop her off the vet.\"     After the stroke, the patient's daughter elected to proceed with feeding tube placement.  (There is a palliative care note from her hospitalization at [facility] on 1/3/2022 that seems to imply that they had advised against feeding tube placement.) The patient has had a slow rehab progress at TCU, but has been able to ambulate a few feet on her own recently.Víctor is really struggling watching her mom's slow progress since the stroke. She is DNR/DNI.    -Palliative care consultation         Diet: Adult Formula Drip Feeding: Continuous Jevity 1.5; Gastrostomy; Goal Rate: 40; mL/hr; Medication - Feeding Tube Flush Frequency: At least 15-30 mL water before and after medication administration and with tube clogging; " "Initiate at 10 cc/hr and in...  Pureed Diet (level 4) Liquidized/Moderately Thick (level 3)    DVT Prophylaxis: VTE Prophylaxis contraindicated due to agitation and GI bleeding,   Richardson Catheter: Not present  Central Lines: None  Cardiac Monitoring: ACTIVE order. Indication: ICU  Code Status: No CPR- Do NOT Intubate      Disposition Plan   Expected Discharge: 02/22/2022     Anticipated discharge location: inpatient rehabilitation facility ([facility]hold with Mary Ann[facility]    Delays:     Specialist Consult (enter specialist & decision needed in comments)            The patient's care was discussed with the Bedside Nurse.    Julián Saenz MD  Hospitalist Service  [facility]  Securely message with the Vocera Web Console (learn more here)  Text page via [email or web address] Paging/Directory         Clinically Significant Risk Factors Present on Admission             # Overweight: Estimated body mass index is 25.04 kg/m  as calculated from the following:    Height as of an earlier encounter on 2/18/22: 1.676 m (5' 6\").    Weight as of this encounter: 70.4 kg (155 lb 1.9 oz).      ______________________________________________________________________    Interval History       Intake/Output Summary (Last 24 hours) at 2/21/2022 1350  Last data filed at 2/21/2022 0900  Gross per 24 hour   Intake 1450 ml   Output 2675 ml   Net -1225 ml     Vitals:    02/21/22 0807   Weight: 70.4 kg (155 lb 1.9 oz)     Wt Readings from Last 4 Encounters:   02/21/22 70.4 kg (155 lb 1.9 oz)   02/18/22 73.8 kg (162 lb 11.2 oz)   02/16/22 74.7 kg (164 lb 11.2 oz)   02/09/22 73.9 kg (163 lb)         Data reviewed today: I reviewed all medications, new labs and imaging results over the last 24 hours. I personally reviewed no images or EKG's today.    Physical Exam   Vital Signs: Temp: 98.3  F (36.8  C) Temp src: Oral BP: 132/52 Pulse: 82   Resp: 22 SpO2: 92 % O2 Device: Nasal cannula Oxygen Delivery: 2 LPM  Weight: 155 " lbs 1.85 oz  Constitutional: awake, alert, cooperative, no apparent distress, and appears stated age  Respiratory: rales and upper air sounds anteriorly    Data     Heart Failure Labs  Outpatient:   Lab Results   Component Value Date    NTBNP 1,732 (H) 02/07/2022    NTBNP 2,041 (H) 01/28/2022       Inpatient:   Lab Results   Component Value Date    NTBNPI 4,603 (H) 02/20/2022       CMPRecent Labs   Lab 02/21/22  1046 02/21/22  0520 02/21/22  0234 02/20/22  1437 02/20/22  1036 02/20/22  0514 02/19/22  2235 02/19/22  0530 02/18/22 2050 02/18/22  1202   NA  --  142  --   --   --  141  --  142  --  138   POTASSIUM 3.4 3.3*  --   --   --  4.1  --  4.1  --  4.4   CHLORIDE  --  105  --   --   --  110*  --  111*  --  105   CO2  --  36*  --   --   --  28  --  28  --  28   ANIONGAP  --  1*  --   --   --  3  --  3  --  5   GLC  --  107* 100* 108* 124* 122*   < > 131*   < > 107*   BUN  --  18  --   --   --  30  --  36*  --  41*   CR  --  0.60  --   --   --  0.70  --  0.67  --  0.63   GFRESTIMATED  --  85  --   --   --  82  --  83  --  84   KYLE  --  7.4*  --   --   --  7.9*  --  7.7*  --  8.9   PROTTOTAL  --   --   --   --   --   --   --   --   --  6.6*   ALBUMIN  --   --   --   --   --   --   --   --   --  3.0*   BILITOTAL  --   --   --   --   --   --   --   --   --  0.7   ALKPHOS  --   --   --   --   --   --   --   --   --  71   AST  --   --   --   --   --   --   --   --   --  16   ALT  --   --   --   --   --   --   --   --   --  32    < > = values in this interval not displayed.     CBC  Recent Labs   Lab 02/21/22  0520 02/20/22  0514 02/19/22  1221 02/19/22  0530 02/18/22  1516 02/18/22  1202   WBC 8.1 12.9*  --  10.4  --  9.6   RBC 2.80* 3.13*  --  2.90*  --  3.75*   HGB 9.2* 10.5* 8.7* 9.6*  9.6*   < > 12.5   HCT 29.7* 34.1*  --  29.9*  --  39.5   * 109*  --  103*  --  105*   MCH 32.9 33.5*  --  33.1*  --  33.3*   MCHC 31.0* 30.8*  --  32.1  --  31.6   RDW 15.8* 16.2*  --  15.9*  --  15.8*    211  --  197   --  238    < > = values in this interval not displayed.     INR  Recent Labs   Lab 22  1202   INR 1.27*       Results for orders placed or performed during the hospital encounter of 22   XR Chest Port 1 View    Narrative    EXAM: XR CHEST PORT 1 VIEW  LOCATION: [facility]  DATE/TIME: 2022 5:47 PM    INDICATION: decreased sats after EGD  COMPARISON: 2022.      Impression    IMPRESSION: Enlarged heart. Calcified plaque of the aorta. Normal pulmonary vascularity. Mild atelectasis/scarring at the lung bases. Lungs otherwise clear. No pleural fluid or pneumothorax. Demineralized skeleton. Degenerative changes of the shoulders.     XR Chest Port 1 View    Narrative    EXAM: XR CHEST PORT 1 VIEW  LOCATION: [facility]  DATE/TIME: 2022 3:47 PM    INDICATION: hypoxia  COMPARISON: 2020 and older studies.      Impression    IMPRESSION: Progressive patchy opacities in both bases likely reflects progressive atelectasis. No effusions or signs of failure. Heart is slightly enlarged but unchanged. Bones are quite demineralized.     Echocardiogram Complete     Value    LVEF  50-55%    Narrative    [id number]  [id number]  [id number]  [id number]^ROCIO^KARISHMA^ALY     [facility]  Echocardiography Laboratory  [address] [place] [address]     Name: AUSTEN SHARP  MRN: [id number]  : 1932  Study Date: 2022 08:17 AM  Age: 89 yrs  Gender: Female  Patient Location: [facility]  Reason For Study: CHF  Ordering Physician: KARISHMA CHAN  Referring Physician: Britni Bhagat  Performed By: Sharmin Laboy RDCS     BSA: 1.8 m2  Height: 66 in  Weight: 163 lb  HR: 90  BP: 150/59 mmHg  ______________________________________________________________________________  Procedure  Complete Portable Echo Adult.  ______________________________________________________________________________  Interpretation Summary     1. The left  ventricle is normal in size. The visual ejection fraction is 50-  55%.  2. The right ventricle is normal in structure, function and size.  3. No valve disease.     No previous echo for comparison.  ______________________________________________________________________________  Left Ventricle  The left ventricle is normal in size. There is normal left ventricular wall  thickness. The visual ejection fraction is 50-55%. Diastolic function not  assessed due to atrial fibrillation. Normal left ventricular wall motion.     Right Ventricle  The right ventricle is normal in structure, function and size.     Atria  Normal left atrial size. Right atrial size is normal. There is no atrial shunt  seen.     Mitral Valve  There is trace to mild mitral regurgitation.     Tricuspid Valve  There is trace to mild tricuspid regurgitation. The right ventricular systolic  pressure is approximated at 37mmHg plus the right atrial pressure.     Aortic Valve  There is mild trileaflet aortic sclerosis. There is trace aortic  regurgitation.     Pulmonic Valve  The pulmonic valve is normal in structure and function.     Vessels  Normal ascending, transverse (arch), and descending aorta. The inferior vena  cava was normal in size with preserved respiratory variability.     Pericardium  There is no pericardial effusion.     Rhythm  The rhythm was atrial fibrillation.  ______________________________________________________________________________  MMode/2D Measurements & Calculations  IVSd: 1.1 cm     LVIDd: 4.5 cm  LVIDs: 3.1 cm  LVPWd: 0.98 cm  FS: 32.0 %  LV mass(C)d: 161.6 grams  LV mass(C)dI: 88.1 grams/m2  Ao root diam: 2.8 cm  LA dimension: 3.1 cm  LA/Ao: 1.1  LVOT diam: 2.1 cm  LVOT area: 3.6 cm2  LA Volume (BP): 60.0 ml  LA Volume Index (BP): 32.8 ml/m2  RWT: 0.43     Doppler Measurements & Calculations  MV E max jn: 101.5 cm/sec  MV dec time: 0.16 sec  Ao V2 max: 198.9 cm/sec  Ao max PG: 15.8 mmHg  Ao V2 mean: 117.6 cm/sec  Ao mean  P.5 mmHg  Ao V2 VTI: 32.0 cm  GEETA(I,D): 2.2 cm2  GEETA(V,D): 2.1 cm2  LV V1 max P.3 mmHg  LV V1 max: 114.7 cm/sec  LV V1 VTI: 19.2 cm  SV(LVOT): 69.6 ml  SI(LVOT): 38.0 ml/m2  TV V2 max: 304.1 cm/sec  TV max P.0 mmHg  AV Jhon Ratio (DI): 0.58  GEETA Index (cm2/m2): 1.2  E/E' av.8  Lateral E/e': 7.8  Medial E/e': 11.8     ______________________________________________________________________________  Report approved by: Bhumi Ruiz 2022 11:21 AM

## 2022-02-21 NOTE — PROGRESS NOTES
Writer is accessing patient chart for review while functioning as MedSurg Charge RN  Jamie Bell RN    Patient's daughter (Víctor) called and asked for update on patient.  Víctor was asking specifically if the XRay that ws taken today in the patient's room had come back and if that indicated pneumonia or not. Writer was able to tell Víctor that the Xray did not show any new changes or effusions, per the impression.  Writer did tell Víctor that the patient did have a 1:1 sitter and that the patient was currently sleeping after having an IM injection of Haldol about 30 minutes ago. Víctor expressed relief and gratitude for this information. Víctor will call back tomorrow around 9 or 10am for further updates.    Jamie Bell RN

## 2022-02-21 NOTE — PROGRESS NOTES
Patient has been drowsy this morning. She was woken up for echo and did become slightly irritated with staff, but lifted to recliner and was happy to be out of bed after echo was done.   She is following commands, but remains disoriented to place, time, situation.   She reports frequently that she can't move and can't walk. No attempts to get out of bed or chair this morning.  1:1 staff removed from room at this time. Will continue to reassess the need for closer monitoring.

## 2022-02-21 NOTE — PROVIDER NOTIFICATION
MD Notification    Notified Person: Telemed MD    Notified Person Name: Mauricio Freitas    Notification Date/Time: 2/21 at 0600     Notification Interaction: paged    Purpose of Notification: K 3.3     Orders Received: MD entered orders for replacement    Comments:

## 2022-02-21 NOTE — CONSULTS
"[facility] - Palliative Care Consultation Note    Patient: Jamia Coley  Date of Admission:  2/18/2022    Requesting Clinician / Team: Dr. Jauregui  Reason for consult: Goals of care  Patient and family support    Assessment/Recommendations:  1)   GOALS OF CARE per daughter    Víctor feels her mom would like more time to see what kid of improvement she could make.  When Víctor directly queried her mom if she wanted to 'go to heaven and be with dad,'  Deacon replied, \"No, I want to stay here.'  I discussed a range of options for Víctor and suggested she think about them carefully.  They range from a full court press with more rehab at [facility] to see where her mom might progress with her functional capacity to considering hospice at this time, and considering whether she would want her mom to return to the acute care hospital in the future. (She thinks she does, especially if 'she 'only' had a pneumonia or something like that.'  Víctor also hopes her mom will be able to take in enough nutritionally and she can stop using the G tube and then decide to never re-start tube feedings if she needed more nutritional support in the future.    Code Status:  DNR/DNI    2)    ADVANCED CARE PLANNING    Patient has completed health care directive:  yes    Documented health care agent:  Daughter, Víctor    Surrogate decision maker if no appointed agent:  n/a    3)    SYMPTOM MANAGEMENT     We are not currently helping to manage symptoms in this patient    No unmet symptom burden identified    4)    PSYCHOSOCIAL/SPIRITUAL SUPPORT    Kandi Renee will benefit from emotional support as her mother's chronic illness has been a strain on her (see below) and it is exacerbated with this recent, acute problems.    Dariela was raised a Latter-day and Víctor thinks she would be OK with the Sacrament of Healing.  I placed a spiritual care consult    These recommendations have been discussed with Dr. Saenz.      Thank you for the opportunity to " participate in the care of this patient and family.      During regular M-F work hours -- if you are not sure who specifically to contact -- please contact us by calling [phone].        Palliative Care Assessment      Prognosis, Goals, and/or Advance Care Planning were addressed today:  YES    Mood, coping, and/or meaning in the context of serious illness were addressed today: YES    Summary of Palliative Encounter:  I discussed the reason for Palliative Care Referral and our role in symptom management, patient family communication, understanding their choices for medical treatment, and providing  guidance in making difficult decisions in the framework of focusing on patient comfort and quality of life.  Reviewed current conditions and treatments including  Recent ulcer/GI bleed secondary to G tube and dementia.    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    I met with Dariela in her room and we had a limited conversation due to her dementia and hearing difficulties even when using a pocket talker.  We were joined by her daughter Víctor.  Dariela was clear that isn't ready to 'die yet' when Víctor asked if she wants to 'go to [facility] and be with dad.'    Dariela had been given a dose of haldol just prior to our meeting and soon fell asleep.  Víctor and I stepped out and continued our conversation.  Víctor has a lot of stress in being her mom's primary care giver.  She works in a nursing facility.  Víctor told me she had a cardiac arrest and was taken by helicopter to [facility] and diagnosed with Takutsabu cardiomyopathy.  She then reported that she and her mom are 'joined at the hip' and difficult as things have been she isn't sure if her mom's goals are comfort or restorative focused.  She thinks more restorative at this time as her mom has made some significant neurologic recovery from her CVA in early January. She is also eating more and her tube feeds are being titrated  down.  She also worries that the recent difficulties with the G tube are 'one more thing' for her mom to try to over come and she wonders when she will run out of ability to rebound.      I outlined for Víctor that there will several decision  making steps in the future for her mom and I tried to describe what some of them might be including development of an acute infectious process and then needing to decide if she will return to the acute care hospital (Víctor thinks this might be OK and she's not sure she could deny her mom antibiotics); it could also include her mom developing other complications from her CVA or that at some point she might decide to stop using the G tube and just let her mom take in what nutrition she can orally or a decision that her mom would want a comfort focus to her care and work with hospice.  Víctor hopes her mom would 'just pass away in her sleep.'  She still has a lot of processing to do about her desire for her mom to live and her understanding that her life is likely winding down though she is impressed by her partial neurological recovery and wonders if that isn't a sign to keep pushing for more restorative goals.  Víctor says she struggled with whether to place a feeding tube and feels vindicated given her mom's recovery to date and ability to eat 50+% of most meals.    I told Víctor she does not need to make any decisions today and she should consider these scenarios and discuss them with the clinical team at her mom's rehab unit.  She expressed gratitude for our conversation and the care her mom has been receiving.    Medical Situation: Jamia Coley is a 89 year old female admitted on 2/18/2022.  Patient with dementia who has resided in assisted living facility.  Unfortunately she had a infarct of the central/lateral clark in early January of this year.  This left her dependent for all ADLs and unable to ambulate initially.  Due to dysphagia, a feeding tube was placed and  patient was discharged to TCU where she has been recovering since.  Her feeding tube was not functioning and so this was replaced 2/17 and then she presented to the hospital 2/18 with bleeding from the gastrostomy site.    Previous Palliative Care Encounters:  One at [facility] around G tube placement in early January    Functional Status:     Functional Status two weeks prior to hospitalization:   PPS (0 = death; 100 = normal):   60%- 1. Reduced; 2. Unable to do hobby/housework, significant disease; 3. Occasional assistance necessary; 4. Normal or reduced; 5. Full or confusion  This is prior to the CVA in early January    Functional status Current:    PPS: (0 = death; 100 = normal):   20%- 1. Totally bed bound; 2. Unable to do any activity, extensive disease; 3. Total care; 4. Minimal to sips; 5. Full or drowsy +/- confusion     Prognosis, Goals, & Planning:   Prognosis (quality & quantity): guarded and uncertain--it is uncertain what Dariela's neurologic recovery will be.  Basis for estimate:  Clinical judgment  High risk of death within one year? YES     Patient's decision making preferences: unable to assess    Capacity evaluation:    Dariela  DOES NOT have capacity to make a decision regarding COMPLEX GOALS OF CARE based on the following:    INABILITY to understand relevant information about his/her condition.    INABILITY to demonstrate understanding of his/her illness and care needs.    INABILITY to demonstrate reasoning to make decisions regarding his/her illness.    ABILITY to communicate his/her options for treatment.         I have concerns about the patient/family's health literacy today: NO            Patient has a completed Health Care Directive: Yes, and on file.      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     Hobbies: cooking for dozens of people at their [place] in [place], [place]     Patient is 'famous for...her cooking and never being fazed by the demands    Spirituality: raised Bahai,  a Pentecostalism and since she couldn't drive, was unable to attend Catholic     Alcohol: rare     Tobacco: rare     Drugs: none      Living situation: independent assisted living    Current in-home services: assistance with preparing for bed and with meds      History of Present Illness:  History gathered today from: daughter Víctor and Epic record    Jamia Coley is a 89 year old female admitted on 2022.  Patient with dementia who has resided in assisted living facility.  Unfortunately she had a infarct of the central/lateral clark in early January of this year.  This left her dependent for all ADLs and unable to ambulate initially.  Due to dysphagia, a feeding tube was placed and patient was discharged to TCU where she has been recovering since.  Her feeding tube was not functioning and so this was replaced  and then she presented to the hospital at [facility]  with bleeding from the gastrostomy site.    Key Palliative Symptom Data:  # Pain severity the last 12 hours: none  # Dyspnea severity the last 12 hours: none  # Nausea severity the last 12 hours: none  # Anxiety severity the last 12 hours: moderate    ROS:  Comprehensive ROS is reviewed and is negative except as here & per HPI: denies depression,     Past Medical History:  Past Medical History:   Diagnosis Date     ACS (acute coronary syndrome) (H) 10/01/2012     Anxiety      Cardiomyopathy (H) 1/3/2022    EF 25 to 30%, echo 1/3/2022. EF normalized 2022      Contact dermatitis and other eczema due to other specified agent 11/15/2012     COVID-19 11/27/2020     Dementia (H)      Hypertension      Syncope 09/30/2012     Thyroid disease      Transient ischemic attack (TIA), and cerebral infarction without residual deficits(V12.54) 11/30/2010        Past Surgical History:  Past Surgical History:   Procedure Laterality Date     GYN SURGERY           Family History:  No family history on file.      Allergies:  Allergies   Allergen Reactions     Penicillins Anaphylaxis, Itching and Rash     Donepezil Unknown     Galantamine Other (See Comments)     Bradycardia     Rivastigmine Dizziness        Medications:  I have reviewed this patient's medication profile and medications from this hospitalization.       brimonidine  1 drop Left Eye BID     dorzolamide-timolol  1 drop Left Eye BID     gabapentin  200 mg Per G Tube TID     latanoprost  1 drop Left Eye At Bedtime     levothyroxine  150 mcg Per G Tube QPM     [Held by provider] lisinopril  20 mg Per G Tube Daily     pantoprazole (PROTONIX) IV  40 mg Intravenous Q12H     QUEtiapine  12.5 mg Per G Tube BID     rosuvastatin  5 mg Per G Tube Daily     sertraline  100 mg Per G Tube Daily     sucralfate  1 g Per G Tube 4x Daily AC & HS        PRN MEDS:   glucose **OR** dextrose **OR** glucagon, haloperidol lactate, hydrALAZINE, labetalol, metoprolol, naloxone **OR** naloxone **OR** naloxone **OR** naloxone, ondansetron **OR** ondansetron, QUEtiapine     Morphine Equivalent Daily Dose: ZERO    Physical Exam:   Temp  Min: 97.8  F (36.6  C)  Max: 98.6  F (37  C)    Pulse  Min: 81  Max: 82    Resp  Min: 18  Max: 22    BP  Min: 130/59  Max: 150/59    SpO2  Min: 78 %  Max: 98 %        General Appearance: older woman who struggled to hear me until I placed a pocket talker on her; then she was more engaged in the conversation  Head: Normocephalic, right facial droop, atraumatic.   Eyes: Conjunctivae/corneas clear.   Throat: Lips, mucosa, and  tongue moist; teeth and gums normal.   Resp: unlabored; rare wet sounding cough  CV: RRR to palpation at wrist  Skin: Warm and dry, no rashes in exposed areas.   Neurologic: A & O X 2; very weak right side  Psych:  Poor short term memory; does not appear depressed;      Intake/Output Summary (Last 24 hours) at 2/21/2022 1544  Last data filed at 2/21/2022 1357  Gross per 24 hour   Intake 1760 ml   Output 3125 ml   Net -1365 ml          Data reviewed:  Reviewed recent pertinent imaging, comments:   Today's chest xray show mild cardiomegaly;  Increased basilar opacities, prominent pulmonary vasculature and demineralization in the skeletal elements.    Reviewed recent labs, comments:   Lab Results: personally reviewed    Data   Recent Labs   Lab 02/21/22  1046 02/21/22  0520 02/18/22  1516 02/18/22  1202   WBC  --  8.1   < > 9.6   HGB  --  9.2*   < > 12.5   PLT  --  183   < > 238   POTASSIUM 3.4 3.3*   < > 4.4   CHLORIDE  --  105   < > 105   CO2  --  36*   < > 28   BUN  --  18   < > 41*   CR  --  0.60   < > 0.63   KYLE  --  7.4*   < > 8.9   GLC  --  107*   < > 107*   ALBUMIN  --   --   --  3.0*   PROTTOTAL  --   --   --  6.6*   BILITOTAL  --   --   --  0.7   ALKPHOS  --   --   --  71   ALT  --   --   --  32   AST  --   --   --  16    < > = values in this interval not displayed.     Lab Results   Component Value Date/Time    ALBUMIN 3.0 (L) 02/18/2022 12:02 PM    ALBUMIN 4.6 09/30/2012 08:27 PM       Weights:   Vitals:    02/21/22 0807 02/21/22 1400   Weight: 70.4 kg (155 lb 1.9 oz) 72 kg (158 lb 11.7 oz)    Body mass index is 25.62 kg/m .        Information gathered today from: family/loved ones, medical team members, unit team members and chart review in Epic.    TTS: I have personally spent a total of 85 minutes  today on the unit in review of medical record, consultation with the medical providers and assessment of patient today, with more than 50% of this time spent in counseling about goals of care, coordination of  care, and conversation in a family meeting with daughter and HCA,Víctor, by the bedside  re:  prognosis, symptom management, emotional support, risks and benefits of management options, and development of plan of care.     Bay Fabian MD MS FAAFP  [facility]  Office [phone]  Fax [phone]    During regular M-F work hours -- if you are not sure who specifically to contact -- please contact us by calling [phone].

## 2022-02-21 NOTE — PROGRESS NOTES
Patient alert but disoriented x 4 this shift. Was calm and cooperative while daughter was visiting, but became restless and agitated when she left, yelling out for help, taking oxygen mask off, and trying to get out of bed. PRN Haldol given IM at 1923. 800 mL urine collected via purewick, patient also had a fully saturated brief and bedding. Kristin care provided. Mepilex applied to sacral region, and left and right thighs.       Ashleigh Cohen RN on 2/20/2022 at 9:43 PM

## 2022-02-21 NOTE — PLAN OF CARE
DATE & TIME: 2/20 Night shift     Cognitive Concerns/ Orientation : Hard of hearing. Alert but confused, unable to assess orientation.   BEHAVIOR & AGGRESSION TOOL COLOR: Yellow: Agitated/Resistive when awakened for cares. Swearing at staff.   ABNL VS/O2: HR bradycardic at times, other VSS on 2 L oxymask   MOBILITY: Total care, lift  PAIN MANAGEMENT: No nonverbals of pain   DIET: TF at 40/hr (new bottle and tubing hung at 0230) with 130 ml flush Q6H.  Pureed diet with honey thickened liquids but 0 oral intake this shift  BOWEL/BLADDER: Incontinent, purewick in place with good urine output after lasix on evening shift. Last BM was prior to admission on 2/18  ABNL LAB/BG: , BNP 4600, WBC 12.9, Hgb 10.5  DRAIN/DEVICES: New PIV placed on evening shift is SL, PEG with continuous feeding, Purewick   TELEMETRY RHYTHM: Afib with CVR  SKIN: Pale.  Mepilex to coccyx and bilateral hips CDI  TESTS/PROCEDURES: ECHO today, palliative consult today  D/C DATE: pending   OTHER IMPORTANT INFO: Rhonchi auscultated. Sounds audibly congested. Edema of right arm and bilateral lower extremities.   Appeared to sleep well most of the night. Would awaken to cares and be agitated but then would proceed to fall back to sleep once cares were completed.     Addendum: K 3.3 this morning. On call tele med MD notified. K+ replacement ordered and given.

## 2022-02-21 NOTE — PROGRESS NOTES
CLINICAL NUTRITION SERVICES - ASSESSMENT NOTE     Nutrition Prescription    RECOMMENDATIONS FOR MDs/PROVIDERS TO ORDER:  None at this time    Malnutrition Status:    Moderate malnutrition in the context of acute on chronic illness    Recommendations already ordered by Registered Dietitian (RD):  Please send magic cup once daily with evening meals-Rolo    Future/Additional Recommendations:  Monitor patient weight, intakes, and tolerance to supplement  Monitor tolerance to TF and schedule/rate modifications     REASON FOR ASSESSMENT  Jamia Coley is a/an 89 year old female assessed by the dietitian for Admission Nutrition Risk Screen for positive    NUTRITION HISTORY  Per chart review  -Patient alert but disorientated   -Patient is currently on continuous feeding regimen of Jevity 1.5 40 ml/hr, Patient also is on a pureed diet and honey thick liquids.   -Patient feeding tube placed d/t poor diet intake (dysphagia) on 1/12/22. Patient had the tube replaced on 2/17. Patient presented on 2/18 d/t bleeding from gastrostomy site.  -Patient has a medical hx of acute on chronic systolic heart failure  -Per daughter patient has been eating 50-50% of her meals as of late but remains on feeds at this time.    Patient not appropriate for interview at this time. Per daughter report.  -Patient was eating % on her own with pureed prior to this admission  -Per daughter report patient was consuming magic cups int the hospital.  -Daughter wan unable to speak on Jamia's oral intake this admission  -Per daughter she thinks her mom would be open to receiving a magic cup during this admission.    CURRENT NUTRITION ORDERS  Diet: Orders Placed This Encounter      Pureed Diet (level 4) Liquidized/Moderately Thick (level 3)  Patient is also receiving Jevity 1.5 via TF @ 40 mL/hr    Intake/Tolerance: Patient has no recorded intakes in her chart    LABS  Labs reviewed  Patient potassium low today (3.3 mmol/L)-Being  replaced    MEDICATIONS  Medications reviewed  Scheduled: Levothyroxine, Protonix, Klor-Con, Crestor, Carafate  Continuous: None  PRN: No nutrition pertinent med's given on 2/21  ANTHROPOMETRICS  Height: 0 cm (Data Unavailable)  Most Recent Weight:      IBW: 59 kg (130 lb)  BMI: Overweight BMI 25-29.9  Weight History:   Wt Readings from Last 15 Encounters:   02/21/22 70.4 kg (155 lb 1.9 oz)   02/18/22 73.8 kg (162 lb 11.2 oz)   02/16/22 74.7 kg (164 lb 11.2 oz)   02/09/22 73.9 kg (163 lb)   02/07/22 70.9 kg (156 lb 6.4 oz)   02/04/22 69.1 kg (152 lb 4.8 oz)   02/03/22 69.5 kg (153 lb 3.2 oz)   02/01/22 68.7 kg (151 lb 6.4 oz)   01/28/22 67.5 kg (148 lb 12.8 oz)   01/27/22 67.5 kg (148 lb 12.8 oz)   01/24/22 67.7 kg (149 lb 4.8 oz)   01/19/22 67.1 kg (148 lb)   10/01/12 78 kg (171 lb 15.3 oz)   5 % weight loss within one month; significant. May be related to fluid fluctuation or previous over feeding.  -Per RN on 2/7; noted weight gain. patient was eating % of her meals and was ate 80 mL/hr X 12 hrs. At this time patient is about where she is at currently.     Dosing Weight: 62.7 kg-adjusted BW used    ASSESSED NUTRITION NEEDS  Estimated Energy Needs:1,254-1,567 kcals/day (20 - 25 kcals/kg-adjusted BW)  Justification: Overweight  Estimated Protein Needs:62.7-75 grams protein/day (1 - 1.2 grams of pro/kg)  Justification: Increased needs  Estimated Fluid Needs: 1,254-1,567 mL/day (1 mL/kcal)   Justification: Per provider pending fluid status    PHYSICAL FINDINGS  See malnutrition section below.  Mild to moderate edema noted    MALNUTRITION  % Intake: adequate from TF regimen; does not meet criteria  % Weight Loss: 5% weight loss within one month  Subcutaneous Fat Loss: Unable to assess  Muscle Loss: Unable to assess  Fluid Accumulation/Edema: Mild- Moderate  Malnutrition Diagnosis: Moderate malnutrition in the context of acute on chronic illness    NUTRITION DIAGNOSIS   Moderate malnutrition in the context of 5%  weight loss within one month and moderate edema.    INTERVENTIONS  Implementation  Collaboration with other providers- IDT rounds  Medical food supplement therapy-Please send a magic cup once daily-Vanilla        Goals  Total avg nutritional intake to meet a minimum of 1,254 kcal/kg and 63 g PRO/kg daily (per dosing wt 62.7 kg).     Monitoring/Evaluation  Progress toward goals will be monitored and evaluated per protocol.    Corrie Kowalski RDN, OLIVIA  Clinical Dietitian  Office: [phone]  Weekend pager: [phone]

## 2022-02-22 LAB
ALBUMIN SERPL-MCNC: 2.3 G/DL (ref 3.4–5)
ALP SERPL-CCNC: 53 U/L (ref 40–150)
ALT SERPL W P-5'-P-CCNC: 62 U/L (ref 0–50)
ANION GAP SERPL CALCULATED.3IONS-SCNC: 2 MMOL/L (ref 3–14)
AST SERPL W P-5'-P-CCNC: 64 U/L (ref 0–45)
BILIRUB SERPL-MCNC: 0.6 MG/DL (ref 0.2–1.3)
BUN SERPL-MCNC: 19 MG/DL (ref 7–30)
CALCIUM SERPL-MCNC: 7.5 MG/DL (ref 8.5–10.1)
CHLORIDE BLD-SCNC: 103 MMOL/L (ref 94–109)
CO2 SERPL-SCNC: 34 MMOL/L (ref 20–32)
CREAT SERPL-MCNC: 0.55 MG/DL (ref 0.52–1.04)
ERYTHROCYTE [DISTWIDTH] IN BLOOD BY AUTOMATED COUNT: 15.6 % (ref 10–15)
GFR SERPL CREATININE-BSD FRML MDRD: 87 ML/MIN/1.73M2
GLUCOSE BLD-MCNC: 129 MG/DL (ref 70–99)
HCT VFR BLD AUTO: 29.9 % (ref 35–47)
HGB BLD-MCNC: 9.2 G/DL (ref 11.7–15.7)
MCH RBC QN AUTO: 33 PG (ref 26.5–33)
MCHC RBC AUTO-ENTMCNC: 30.8 G/DL (ref 31.5–36.5)
MCV RBC AUTO: 107 FL (ref 78–100)
PLATELET # BLD AUTO: 174 10E3/UL (ref 150–450)
POTASSIUM BLD-SCNC: 3.5 MMOL/L (ref 3.4–5.3)
PROCALCITONIN SERPL-MCNC: 0.09 NG/ML
PROT SERPL-MCNC: 5.7 G/DL (ref 6.8–8.8)
RBC # BLD AUTO: 2.79 10E6/UL (ref 3.8–5.2)
SODIUM SERPL-SCNC: 139 MMOL/L (ref 133–144)
WBC # BLD AUTO: 8.1 10E3/UL (ref 4–11)

## 2022-02-22 PROCEDURE — 120N000001 HC R&B MED SURG/OB

## 2022-02-22 PROCEDURE — 80053 COMPREHEN METABOLIC PANEL: CPT | Performed by: INTERNAL MEDICINE

## 2022-02-22 PROCEDURE — 82040 ASSAY OF SERUM ALBUMIN: CPT | Performed by: INTERNAL MEDICINE

## 2022-02-22 PROCEDURE — 84145 PROCALCITONIN (PCT): CPT | Performed by: INTERNAL MEDICINE

## 2022-02-22 PROCEDURE — C9113 INJ PANTOPRAZOLE SODIUM, VIA: HCPCS | Performed by: INTERNAL MEDICINE

## 2022-02-22 PROCEDURE — 250N000013 HC RX MED GY IP 250 OP 250 PS 637: Performed by: INTERNAL MEDICINE

## 2022-02-22 PROCEDURE — 85027 COMPLETE CBC AUTOMATED: CPT | Performed by: INTERNAL MEDICINE

## 2022-02-22 PROCEDURE — 250N000011 HC RX IP 250 OP 636: Performed by: INTERNAL MEDICINE

## 2022-02-22 PROCEDURE — 99233 SBSQ HOSP IP/OBS HIGH 50: CPT | Performed by: INTERNAL MEDICINE

## 2022-02-22 PROCEDURE — 36415 COLL VENOUS BLD VENIPUNCTURE: CPT | Performed by: INTERNAL MEDICINE

## 2022-02-22 RX ORDER — GABAPENTIN 300 MG/1
300 CAPSULE ORAL 3 TIMES DAILY
Status: DISCONTINUED | OUTPATIENT
Start: 2022-02-22 | End: 2022-02-28 | Stop reason: HOSPADM

## 2022-02-22 RX ORDER — HALOPERIDOL 5 MG/ML
5 INJECTION INTRAMUSCULAR EVERY 6 HOURS PRN
Status: DISCONTINUED | OUTPATIENT
Start: 2022-02-22 | End: 2022-02-28 | Stop reason: HOSPADM

## 2022-02-22 RX ORDER — LISINOPRIL 5 MG/1
5 TABLET ORAL DAILY
Status: DISCONTINUED | OUTPATIENT
Start: 2022-02-22 | End: 2022-02-23

## 2022-02-22 RX ADMIN — GABAPENTIN 200 MG: 100 CAPSULE ORAL at 08:45

## 2022-02-22 RX ADMIN — SUCRALFATE 1 G: 1 SUSPENSION ORAL at 16:40

## 2022-02-22 RX ADMIN — DORZOLAMIDE HYDROCHLORIDE AND TIMOLOL MALEATE 1 DROP: 20; 5 SOLUTION OPHTHALMIC at 20:08

## 2022-02-22 RX ADMIN — BRIMONIDINE TARTRATE 1 DROP: 2 SOLUTION/ DROPS OPHTHALMIC at 20:08

## 2022-02-22 RX ADMIN — PANTOPRAZOLE SODIUM 40 MG: 40 INJECTION, POWDER, FOR SOLUTION INTRAVENOUS at 20:08

## 2022-02-22 RX ADMIN — HALOPERIDOL LACTATE 5 MG: 5 INJECTION, SOLUTION INTRAMUSCULAR at 10:00

## 2022-02-22 RX ADMIN — QUETIAPINE 12.5 MG: 25 TABLET ORAL at 20:08

## 2022-02-22 RX ADMIN — LATANOPROST 1 DROP: 50 SOLUTION OPHTHALMIC at 22:54

## 2022-02-22 RX ADMIN — ROSUVASTATIN CALCIUM 5 MG: 5 TABLET, FILM COATED ORAL at 08:45

## 2022-02-22 RX ADMIN — HALOPERIDOL LACTATE 5 MG: 5 INJECTION, SOLUTION INTRAMUSCULAR at 04:09

## 2022-02-22 RX ADMIN — PANTOPRAZOLE SODIUM 40 MG: 40 INJECTION, POWDER, FOR SOLUTION INTRAVENOUS at 08:48

## 2022-02-22 RX ADMIN — SUCRALFATE 1 G: 1 SUSPENSION ORAL at 22:54

## 2022-02-22 RX ADMIN — SUCRALFATE 1 G: 1 SUSPENSION ORAL at 11:48

## 2022-02-22 RX ADMIN — GABAPENTIN 300 MG: 300 CAPSULE ORAL at 14:47

## 2022-02-22 RX ADMIN — GABAPENTIN 300 MG: 300 CAPSULE ORAL at 20:08

## 2022-02-22 RX ADMIN — SERTRALINE HYDROCHLORIDE 100 MG: 20 SOLUTION ORAL at 09:05

## 2022-02-22 RX ADMIN — LEVOTHYROXINE SODIUM 150 MCG: 0.15 TABLET ORAL at 16:40

## 2022-02-22 RX ADMIN — LISINOPRIL 5 MG: 5 TABLET ORAL at 14:46

## 2022-02-22 RX ADMIN — QUETIAPINE 12.5 MG: 25 TABLET ORAL at 09:05

## 2022-02-22 RX ADMIN — Medication 6.25 MG: at 14:51

## 2022-02-22 ASSESSMENT — ACTIVITIES OF DAILY LIVING (ADL)
ADLS_ACUITY_SCORE: 37
ADLS_ACUITY_SCORE: 37
ADLS_ACUITY_SCORE: 41
ADLS_ACUITY_SCORE: 37
ADLS_ACUITY_SCORE: 37
ADLS_ACUITY_SCORE: 39
ADLS_ACUITY_SCORE: 37
ADLS_ACUITY_SCORE: 39
ADLS_ACUITY_SCORE: 37
ADLS_ACUITY_SCORE: 39
ADLS_ACUITY_SCORE: 39
ADLS_ACUITY_SCORE: 37
ADLS_ACUITY_SCORE: 39
ADLS_ACUITY_SCORE: 37
ADLS_ACUITY_SCORE: 39
ADLS_ACUITY_SCORE: 37

## 2022-02-22 NOTE — PROGRESS NOTES
"Patient yelling \"help me\", \"get me up\", \"lay me down\" since 0800.  Assisted into chair via ceiling lift and gave morning medications via PEG.  Refused eye drops by stating NO and putting her hand up to nurse.  She refused to eat breakfast and refused to work with speech therapy.  Shortly after assisted into chair, patient trying to wiggle out onto the floor and yelling that she just wants to crawl around onto the floor.  Assisted back into bed via ceiling lift where she fell asleep for about a 30 minute interval.  Continues to scream and be disruptive, medicated with IV haldol and nursing assistant at bedside providing lavender hand massages.  Patient likes country music, country music playing.  Currently she is calm and not yelling.   "

## 2022-02-22 NOTE — PROGRESS NOTES
SPIRITUAL HEALTH SERVICES  [facility] - Med/Surg    Referral Source: Palliative Care Dr Fabian    - Daughter Víctor, in her discussion with Dr Fabian, asked if it could be arranged for her mother, Dariela, could receive the Sacrament of Healing.  She felt she would appreciate that.    - I contacted  [facility] in [place].  Spiritual Care Coordinator, Ashanti CAMPBELL, stated that one of their priests will be arriving today to perform the Sacrament of Healing for patient.    Plan:  will remain available for any ongoing support needs during LOS.    Sly Spivey M.A., [facility]  Lead   [facility]  Office: [phone]

## 2022-02-22 NOTE — PLAN OF CARE
"    Patient repositioned for comfort every 2 hours. Is incontinent, had BM and has incontinence pads and purewick in place. Lungs have crackles at bilateral bases, has rattle in the back of her throat: refuses suction or intervention. Patient asleep until 0430, awoke confused, \"I don't believe you that I'm in the hospital.\" -- yelling with agitation, \"help me!\" Gave 5 mg Haldol IV at 0400.     Jevity running per GT at 40 mLs/hr with 130 mLs of water every 6 hours.           Goal Outcome Evaluation:    Plan of Care Reviewed With: patient     Outcome Evaluation: will continue with care plan goals.             "

## 2022-02-22 NOTE — PROGRESS NOTES
"[facility]    Medicine Progress Note - Hospitalist Service    Date of Admission:  2/18/2022    Assessment & Plan          Jamia Coley is a 89 year old female admitted on 2/18/2022.  Patient with dementia who has resided in assisted living facility.  Unfortunately she had a infarct of the central/lateral clark in early January of this year.  This left her dependent for all ADLs and unable to ambulate initially.  Due to dysphagia, a feeding tube was placed and patient was discharged to TCU where she has been recovering since.  Her feeding tube was not functioning and so this was replaced 2/17 and then she presented to the hospital 2/18 with bleeding from the gastrostomy site.    PEG tube site bleeding  Acute blood loss anemia  PEG tube was initially placed on 1/12/2022, but was replaced on 2/17/2022 due to tube clogging.  Hemoglobin decreased from 12.5-10.9 in the emergency department.  On January 28, 2022 hemoglobin was 13.2.  EGD on 2/18/2022 with \"Bleeding ulcer adjacent to intact gastrostomy tube.  This was controlled with 7mL of 1:10,000 epinephrine, 2 MRI conditional hemostatic clips, electrocautery\":    HGB stable  -Continue IV Protonix and Carafate.    Possible aspiration  Acute hypoxic respiratory failure 2/18/22  In the PACU 2/18, patient was requiring increased suctioning.  She was admitted to the intensive care unit overnight due to increased care needs.  Antibiotics were not initiated.  Chest x-ray without any evidence of infiltrate.  Oxygen was able to be weaned off by the afternoon of 2/19/2022.  However, she then had worsening oxygenation requirements that evening up to 4 LPM.  Suspicion is that this is secondary to volume replacement triggering a CHF exacerbation.  She developed a leukocytosis of 12.9 2/20 but this is after receiving steroids evening of 2/18 while in the operating room.  - See below    Acute on chronic diastolc heart failure  History of " Addended by: CESAR ATKINS on: 11/2/2020 12:10 PM     Modules accepted: Orders     cardiomyopathy by echo 1/2022, resolved  Hypertension then hypotension 2/20/2022   Acute hypoxic respiratory failure 2/20/22  Hypertension  Patient was hypotensive morning of 2/19 secondary to her ACE inhibitor in the setting of hypovolemia from her bleeding.  She was given 500 cc LR bolus and this ameliorated her hypotension.  However 2/20/2022 developed evidence of hypervolemia with bilateral rhonchi and worsening peripheral edema and acute hypoxia up to 4 L O2 as outlined above.      At the time of her stroke 1/2022, patient was diagnosed with systolic heart failure with a newly low EF of 25% with global hypokinesis of the LV noted 1/3/2022.  The etiology of her cardiomyopathy was not identified.  She has been maintained on furosemide 40 mg once daily, this was held on admission due to hypotension    BNP  4603.  Procalcitonin 0.15. CXR 2/20/22 - Progressive patchy opacities in both bases likely reflects progressive atelectasis. No effusions or signs of failure. Heart is slightly enlarged but unchanged. Bones are quite demineralized. Received Furosemide 40 mg IV x2 2/21/2022. Held her lisinopril given hypotension requiring IV fluid bolus 2/19. Repeat Echo 2/21/2022 with normalized EF. Gave additional lasix 40 IV x1 2/21/2022     O2 needs stable 2 LPM, BPs stable  Cannot rule out ongoing aspiration events   Weight not done, I/O not being tracked  - Check O2 on RA  - Will not give additional lasix IV today due to increasing bicarb/alkalosis  - Check VBG in AM   - Check procalcitonin, CRP  - Restart Lisinopril 5mg (usual 20)  - Continue to hold usual Lasix 40  - I/O, daily weights if able    Dementia with behavioral issues  Depression  Anxiety  While at TCU, she had debilitating anxiety which she had prior to stroke but seems to have worsened since then.  Seem to be more calm 2/19/2022 and so her scheduled gabapentin 200 TID, Seroquel 12.5 BID, seroquel 6.25 po q6 prn were held to evaluate avoid oversedation but then  "significantly anxious 2022, was started on Haldol as needed.    Agitated last night, this morning  Received haldol x2 in past 24 hours for agitation, now somnolent  - Continue prior to admission sertraline 100 mg.    - Increase scheduled gabapentin to 300 TID  - Continue Seroquel 12.5 BID  - Continue seroquel 6.25 q 6 pft (not receiving) -  Give first  - Continue  Haldol 5 mg IV q 6 prn.    History of CVA  Right hemiparesis  The patient is dependent for all ADLs and transfers.  Per daughter, she recently ambulated 8 feet at the transitional care.  Prior to her stroke in January, she was ambulatory and physically functional although had significant dementia.    Dysphagia  Per daughter patient has actually been eating up to 50 to 50% of her meals as of late but she remains on tube feeds at this time.  - Reinitiated tube feeding with her home routine Jevity 1.5 Kiet continuous feeding at 40 cc/hr (will start with 10 cc an hour and advance by 10 every 4 hours until goal), as well as a dysphagia 1 pureed diet with honey thick liquids and free water flushes 130cc every 6 hours.     - Speech consult, patient refused 2022     Atrial fibrillation  Patient is in atrial fibrillation by EKG on admission and echo 2022   The patient is not on rate control medication, but rate is controlled.   -Hold prior to admission apixaban due to bleeding.    Hypothyroidism  TSH 4.39  - Continue prior to admission levothyroxine.    Glaucoma  - Continue prior to admission Alphagan, Cosopt, and Xalatan.    Goals of care  MD discussed with kayce Renee 2022.  She wants Jamia to be comfortable.  She says that Jamia has had multiple family members who had fatal strokes but they all  right away.  She says she would be happy if Jamia fell asleep and did not wake up again, but she does not want to treat Jmaia \"like an animal and drop her off the vet.\"     After the stroke, the patient's daughter elected to " "proceed with feeding tube placement.  (There is a palliative care note from her hospitalization at [facility] ER on 1/3/2022 that seems to imply that they had advised against feeding tube placement.) The patient has had a slow rehab progress at TCU, but has been able to ambulate a few feet on her own recently.Víctor is really struggling watching her mom's slow progress since the stroke. She is DNR/DNI.      Palliative care consultation 2/21/2022. Patients daughter reports she would like for patient to be able to do rehab and see how far she may progress. Barriers to transfer to TCU include agitation    # Overweight: Estimated body mass index is 25.62 kg/m  as calculated from the following:    Height as of an earlier encounter on 2/18/22: 1.676 m (5' 6\").    Weight as of this encounter: 72 kg (158 lb 11.7 oz).    # Moderate Malnutrition: based on nutrition assessment  % Intake: adequate from TF regimen; does not meet criteria  % Weight Loss: 5% weight loss within one month  Subcutaneous Fat Loss: Unable to assess  Muscle Loss: Unable to assess  Fluid Accumulation/Edema: Mild- Moderate  Malnutrition Diagnosis: Moderate malnutrition in the context of acute on chronic illness         Diet: Adult Formula Drip Feeding: Continuous Jevity 1.5; Gastrostomy; Goal Rate: 40; mL/hr; Medication - Feeding Tube Flush Frequency: At least 15-30 mL water before and after medication administration and with tube clogging; Initiate at 10 cc/hr and in...  Pureed Diet (level 4) Liquidized/Moderately Thick (level 3)  Snacks/Supplements Adult: Magic Cup; With Meals    DVT Prophylaxis: VTE Prophylaxis contraindicated due to agitation and GI bleeding  Richardson Catheter: Not present  Central Lines: None  Cardiac Monitoring: None  Code Status: No CPR- Do NOT Intubate      Disposition Plan   Expected Discharge: 02/23/2022     Anticipated discharge location: inpatient rehabilitation facility (Confirmed bedhold with Mary Ann, staff at [facility])  "   Delays:     Specialist Consult (enter specialist & decision needed in comments)            The patient's care was discussed with the Bedside Nurse.    Julián Saenz MD  Hospitalist Service  [facility]  Securely message with the Vocera Web Console (learn more here)  Text page via [employer] Paging/Directory         Clinically Significant Risk Factors Present on Admission             t     ______________________________________________________________________    Interval History   Agiiated overnight and this morning      Intake/Output Summary (Last 24 hours) at 2/22/2022 1228  Last data filed at 2/22/2022 0900  Gross per 24 hour   Intake 170 ml   Output 2450 ml   Net -2280 ml     Wt Readings from Last 4 Encounters:   02/21/22 72 kg (158 lb 11.7 oz)   02/18/22 73.8 kg (162 lb 11.2 oz)   02/16/22 74.7 kg (164 lb 11.2 oz)   02/09/22 73.9 kg (163 lb)         Data reviewed today: I reviewed all medications, new labs and imaging results over the last 24 hours. I personally reviewed no images or EKG's today.    Physical Exam   Vital Signs: Temp: 98  F (36.7  C) Temp src: Axillary BP: 134/61 Pulse: 85   Resp: 20 SpO2: 92 % O2 Device: Nasal cannula Oxygen Delivery: 2 LPM  Weight: 158 lbs 11.7 oz  Constitutional: sleeping  Respiratory: No increased work of breathing, good air exchange, clear to auscultation anteriorly    Data     Lab Results   Component Value Date    TSH 4.39 02/21/2022    TSH 2.21 10/01/2012        CMPRecent Labs   Lab 02/22/22  0523 02/21/22 2038 02/21/22  1741 02/21/22  1046 02/21/22  0520 02/21/22  0234 02/20/22  1036 02/20/22  0514 02/19/22  2235 02/19/22  0530 02/18/22 2050 02/18/22  1202     --   --   --  142  --   --  141  --  142  --  138   POTASSIUM 3.5  --  3.8 3.4 3.3*  --   --  4.1  --  4.1  --  4.4   CHLORIDE 103  --   --   --  105  --   --  110*  --  111*  --  105   CO2 34*  --   --   --  36*  --   --  28  --  28  --  28   ANIONGAP 2*  --   --   --  1*  --   --  3   --  3  --  5   * 140*  --   --  107* 100*   < > 122*   < > 131*   < > 107*   BUN 19  --   --   --  18  --   --  30  --  36*  --  41*   CR 0.55  --   --   --  0.60  --   --  0.70  --  0.67  --  0.63   GFRESTIMATED 87  --   --   --  85  --   --  82  --  83  --  84   KYLE 7.5*  --   --   --  7.4*  --   --  7.9*  --  7.7*  --  8.9   MAG  --   --   --  1.9  --   --   --   --   --   --   --   --    PROTTOTAL 5.7*  --   --   --   --   --   --   --   --   --   --  6.6*   ALBUMIN 2.3*  --   --   --   --   --   --   --   --   --   --  3.0*   BILITOTAL 0.6  --   --   --   --   --   --   --   --   --   --  0.7   ALKPHOS 53  --   --   --   --   --   --   --   --   --   --  71   AST 64*  --   --   --   --   --   --   --   --   --   --  16   ALT 62*  --   --   --   --   --   --   --   --   --   --  32    < > = values in this interval not displayed.     CBC  Recent Labs   Lab 02/22/22  0523 02/21/22  0520 02/20/22  0514 02/19/22  1221 02/19/22  0530   WBC 8.1 8.1 12.9*  --  10.4   RBC 2.79* 2.80* 3.13*  --  2.90*   HGB 9.2* 9.2* 10.5* 8.7* 9.6*  9.6*   HCT 29.9* 29.7* 34.1*  --  29.9*   * 106* 109*  --  103*   MCH 33.0 32.9 33.5*  --  33.1*   MCHC 30.8* 31.0* 30.8*  --  32.1   RDW 15.6* 15.8* 16.2*  --  15.9*    183 211  --  197     INR  Recent Labs   Lab 02/18/22  1202   INR 1.27*

## 2022-02-22 NOTE — PROGRESS NOTES
Discussed pt status with MD while rounding. Removed 02, pt 02 sats 87% on RA, @1L, sats 89%, @1.5L 02 sats 91%.

## 2022-02-22 NOTE — PLAN OF CARE
Goal Outcome Evaluation:    Plan of Care Reviewed With: patient, but unable to assess if there is understanding   Patient turned thoughout the shift. Patient grabbed right arm when moving but patient released arm once moved and body relaxed. Refused brimonidine eye drop, but compliant with all other medications. Confused on place, time, why they are here, nurse reminded patient. Ate 3 bites of dinner, nurse feed. Purewic in place.

## 2022-02-23 ENCOUNTER — APPOINTMENT (OUTPATIENT)
Dept: CARDIOLOGY | Facility: CLINIC | Age: 87
DRG: 377 | End: 2022-02-23
Attending: INTERNAL MEDICINE
Payer: MEDICARE

## 2022-02-23 PROBLEM — J96.01 ACUTE RESPIRATORY FAILURE WITH HYPOXIA (H): Status: ACTIVE | Noted: 2022-02-20

## 2022-02-23 PROBLEM — J96.01 ACUTE RESPIRATORY FAILURE WITH HYPOXIA (H): Status: ACTIVE | Noted: 2022-02-23

## 2022-02-23 LAB
ALBUMIN SERPL-MCNC: 2.3 G/DL (ref 3.4–5)
ALP SERPL-CCNC: 56 U/L (ref 40–150)
ALT SERPL W P-5'-P-CCNC: 113 U/L (ref 0–50)
ANION GAP SERPL CALCULATED.3IONS-SCNC: <1 MMOL/L (ref 3–14)
AST SERPL W P-5'-P-CCNC: 118 U/L (ref 0–45)
BASE EXCESS BLDV CALC-SCNC: 8.1 MMOL/L (ref -7.7–1.9)
BILIRUB SERPL-MCNC: 0.8 MG/DL (ref 0.2–1.3)
BUN SERPL-MCNC: 18 MG/DL (ref 7–30)
C PNEUM DNA SPEC QL NAA+PROBE: NOT DETECTED
CALCIUM SERPL-MCNC: 7.7 MG/DL (ref 8.5–10.1)
CHLORIDE BLD-SCNC: 100 MMOL/L (ref 94–109)
CK SERPL-CCNC: 23 U/L (ref 30–225)
CO2 SERPL-SCNC: 34 MMOL/L (ref 20–32)
CREAT SERPL-MCNC: 0.51 MG/DL (ref 0.52–1.04)
CRP SERPL-MCNC: 85.2 MG/L (ref 0–8)
ERYTHROCYTE [DISTWIDTH] IN BLOOD BY AUTOMATED COUNT: 15.5 % (ref 10–15)
FLUAV H1 2009 PAND RNA SPEC QL NAA+PROBE: NOT DETECTED
FLUAV H1 RNA SPEC QL NAA+PROBE: NOT DETECTED
FLUAV H3 RNA SPEC QL NAA+PROBE: NOT DETECTED
FLUAV RNA SPEC QL NAA+PROBE: NEGATIVE
FLUAV RNA SPEC QL NAA+PROBE: NOT DETECTED
FLUBV RNA RESP QL NAA+PROBE: NEGATIVE
FLUBV RNA SPEC QL NAA+PROBE: NOT DETECTED
GFR SERPL CREATININE-BSD FRML MDRD: 89 ML/MIN/1.73M2
GGT SERPL-CCNC: 18 U/L (ref 0–40)
GLUCOSE BLD-MCNC: 127 MG/DL (ref 70–99)
GLUCOSE BLDC GLUCOMTR-MCNC: 96 MG/DL (ref 70–99)
HADV DNA SPEC QL NAA+PROBE: NOT DETECTED
HCO3 BLDV-SCNC: 34 MMOL/L (ref 21–28)
HCOV PNL SPEC NAA+PROBE: NOT DETECTED
HCT VFR BLD AUTO: 29.3 % (ref 35–47)
HGB BLD-MCNC: 9.1 G/DL (ref 11.7–15.7)
HMPV RNA SPEC QL NAA+PROBE: NOT DETECTED
HPIV1 RNA SPEC QL NAA+PROBE: NOT DETECTED
HPIV2 RNA SPEC QL NAA+PROBE: NOT DETECTED
HPIV3 RNA SPEC QL NAA+PROBE: NOT DETECTED
HPIV4 RNA SPEC QL NAA+PROBE: NOT DETECTED
LVEF ECHO: NORMAL
Lab: NORMAL
M PNEUMO DNA SPEC QL NAA+PROBE: NOT DETECTED
MCH RBC QN AUTO: 32.7 PG (ref 26.5–33)
MCHC RBC AUTO-ENTMCNC: 31.1 G/DL (ref 31.5–36.5)
MCV RBC AUTO: 105 FL (ref 78–100)
O2/TOTAL GAS SETTING VFR VENT: 24 %
PCO2 BLDV: 51 MM HG (ref 40–50)
PERFORMING LABORATORY: NORMAL
PH BLDV: 7.44 [PH] (ref 7.32–7.43)
PHOSPHATE SERPL-MCNC: 2 MG/DL (ref 2.5–4.5)
PHOSPHATE SERPL-MCNC: 2.2 MG/DL (ref 2.5–4.5)
PLATELET # BLD AUTO: 191 10E3/UL (ref 150–450)
PO2 BLDV: 47 MM HG (ref 25–47)
POTASSIUM BLD-SCNC: 3.8 MMOL/L (ref 3.4–5.3)
PROT SERPL-MCNC: 5.8 G/DL (ref 6.8–8.8)
RBC # BLD AUTO: 2.78 10E6/UL (ref 3.8–5.2)
RSV RNA SPEC QL NAA+PROBE: NOT DETECTED
RSV RNA SPEC QL NAA+PROBE: NOT DETECTED
RV+EV RNA SPEC QL NAA+PROBE: NOT DETECTED
SARS-COV-2 RNA RESP QL NAA+PROBE: NEGATIVE
SODIUM SERPL-SCNC: 134 MMOL/L (ref 133–144)
SPECIMEN STATUS: NORMAL
TEST NAME: NORMAL
TROPONIN I SERPL HS-MCNC: 7 NG/L
WBC # BLD AUTO: 11.2 10E3/UL (ref 4–11)

## 2022-02-23 PROCEDURE — 99232 SBSQ HOSP IP/OBS MODERATE 35: CPT | Performed by: INTERNAL MEDICINE

## 2022-02-23 PROCEDURE — 93321 DOPPLER ECHO F-UP/LMTD STD: CPT

## 2022-02-23 PROCEDURE — 86140 C-REACTIVE PROTEIN: CPT | Performed by: INTERNAL MEDICINE

## 2022-02-23 PROCEDURE — 250N000009 HC RX 250: Performed by: INTERNAL MEDICINE

## 2022-02-23 PROCEDURE — 82550 ASSAY OF CK (CPK): CPT | Performed by: INTERNAL MEDICINE

## 2022-02-23 PROCEDURE — 82977 ASSAY OF GGT: CPT | Performed by: INTERNAL MEDICINE

## 2022-02-23 PROCEDURE — 82803 BLOOD GASES ANY COMBINATION: CPT | Performed by: INTERNAL MEDICINE

## 2022-02-23 PROCEDURE — 250N000013 HC RX MED GY IP 250 OP 250 PS 637: Performed by: INTERNAL MEDICINE

## 2022-02-23 PROCEDURE — C9113 INJ PANTOPRAZOLE SODIUM, VIA: HCPCS | Performed by: INTERNAL MEDICINE

## 2022-02-23 PROCEDURE — 93325 DOPPLER ECHO COLOR FLOW MAPG: CPT | Mod: 26 | Performed by: INTERNAL MEDICINE

## 2022-02-23 PROCEDURE — 93321 DOPPLER ECHO F-UP/LMTD STD: CPT | Mod: 26 | Performed by: INTERNAL MEDICINE

## 2022-02-23 PROCEDURE — 36416 COLLJ CAPILLARY BLOOD SPEC: CPT | Performed by: INTERNAL MEDICINE

## 2022-02-23 PROCEDURE — 250N000011 HC RX IP 250 OP 636: Performed by: INTERNAL MEDICINE

## 2022-02-23 PROCEDURE — 85014 HEMATOCRIT: CPT | Performed by: INTERNAL MEDICINE

## 2022-02-23 PROCEDURE — 87486 CHLMYD PNEUM DNA AMP PROBE: CPT | Performed by: INTERNAL MEDICINE

## 2022-02-23 PROCEDURE — 82040 ASSAY OF SERUM ALBUMIN: CPT | Performed by: INTERNAL MEDICINE

## 2022-02-23 PROCEDURE — 120N000001 HC R&B MED SURG/OB

## 2022-02-23 PROCEDURE — 258N000003 HC RX IP 258 OP 636: Performed by: INTERNAL MEDICINE

## 2022-02-23 PROCEDURE — 84100 ASSAY OF PHOSPHORUS: CPT | Performed by: INTERNAL MEDICINE

## 2022-02-23 PROCEDURE — 99231 SBSQ HOSP IP/OBS SF/LOW 25: CPT | Performed by: FAMILY MEDICINE

## 2022-02-23 PROCEDURE — 93325 DOPPLER ECHO COLOR FLOW MAPG: CPT

## 2022-02-23 PROCEDURE — 87636 SARSCOV2 & INF A&B AMP PRB: CPT | Performed by: INTERNAL MEDICINE

## 2022-02-23 PROCEDURE — 84484 ASSAY OF TROPONIN QUANT: CPT | Performed by: INTERNAL MEDICINE

## 2022-02-23 PROCEDURE — 36415 COLL VENOUS BLD VENIPUNCTURE: CPT | Performed by: INTERNAL MEDICINE

## 2022-02-23 PROCEDURE — 80053 COMPREHEN METABOLIC PANEL: CPT | Performed by: INTERNAL MEDICINE

## 2022-02-23 PROCEDURE — 93308 TTE F-UP OR LMTD: CPT | Mod: 26 | Performed by: INTERNAL MEDICINE

## 2022-02-23 PROCEDURE — 87633 RESP VIRUS 12-25 TARGETS: CPT | Performed by: INTERNAL MEDICINE

## 2022-02-23 RX ORDER — LISINOPRIL 10 MG/1
10 TABLET ORAL DAILY
Status: DISCONTINUED | OUTPATIENT
Start: 2022-02-24 | End: 2022-02-24

## 2022-02-23 RX ORDER — LISINOPRIL 10 MG/1
10 TABLET ORAL DAILY
Status: DISCONTINUED | OUTPATIENT
Start: 2022-02-24 | End: 2022-02-23

## 2022-02-23 RX ORDER — LISINOPRIL 5 MG/1
5 TABLET ORAL ONCE
Status: COMPLETED | OUTPATIENT
Start: 2022-02-23 | End: 2022-02-23

## 2022-02-23 RX ADMIN — DORZOLAMIDE HYDROCHLORIDE AND TIMOLOL MALEATE 1 DROP: 20; 5 SOLUTION OPHTHALMIC at 08:25

## 2022-02-23 RX ADMIN — QUETIAPINE 12.5 MG: 25 TABLET ORAL at 08:24

## 2022-02-23 RX ADMIN — SODIUM PHOSPHATE, MONOBASIC, MONOHYDRATE AND SODIUM PHOSPHATE, DIBASIC, ANHYDROUS 9 MMOL: 276; 142 INJECTION, SOLUTION INTRAVENOUS at 14:26

## 2022-02-23 RX ADMIN — Medication 18.75 MG: at 21:20

## 2022-02-23 RX ADMIN — PANTOPRAZOLE SODIUM 40 MG: 40 INJECTION, POWDER, FOR SOLUTION INTRAVENOUS at 08:25

## 2022-02-23 RX ADMIN — ROSUVASTATIN CALCIUM 5 MG: 5 TABLET, FILM COATED ORAL at 08:24

## 2022-02-23 RX ADMIN — SUCRALFATE 1 G: 1 SUSPENSION ORAL at 21:19

## 2022-02-23 RX ADMIN — GABAPENTIN 300 MG: 300 CAPSULE ORAL at 21:19

## 2022-02-23 RX ADMIN — GABAPENTIN 300 MG: 300 CAPSULE ORAL at 08:24

## 2022-02-23 RX ADMIN — LISINOPRIL 5 MG: 5 TABLET ORAL at 08:24

## 2022-02-23 RX ADMIN — SUCRALFATE 1 G: 1 SUSPENSION ORAL at 17:13

## 2022-02-23 RX ADMIN — GABAPENTIN 300 MG: 300 CAPSULE ORAL at 14:26

## 2022-02-23 RX ADMIN — PANTOPRAZOLE SODIUM 40 MG: 40 INJECTION, POWDER, FOR SOLUTION INTRAVENOUS at 21:30

## 2022-02-23 RX ADMIN — BRIMONIDINE TARTRATE 1 DROP: 2 SOLUTION/ DROPS OPHTHALMIC at 08:25

## 2022-02-23 RX ADMIN — SUCRALFATE 1 G: 1 SUSPENSION ORAL at 10:37

## 2022-02-23 RX ADMIN — LEVOTHYROXINE SODIUM 150 MCG: 0.15 TABLET ORAL at 17:13

## 2022-02-23 RX ADMIN — SERTRALINE HYDROCHLORIDE 100 MG: 20 SOLUTION ORAL at 08:29

## 2022-02-23 RX ADMIN — LISINOPRIL 5 MG: 5 TABLET ORAL at 10:37

## 2022-02-23 RX ADMIN — SUCRALFATE 1 G: 1 SUSPENSION ORAL at 06:52

## 2022-02-23 ASSESSMENT — ACTIVITIES OF DAILY LIVING (ADL)
ADLS_ACUITY_SCORE: 39
ADLS_ACUITY_SCORE: 39
ADLS_ACUITY_SCORE: 37
ADLS_ACUITY_SCORE: 37
ADLS_ACUITY_SCORE: 39
ADLS_ACUITY_SCORE: 37
ADLS_ACUITY_SCORE: 39
ADLS_ACUITY_SCORE: 37
ADLS_ACUITY_SCORE: 39
ADLS_ACUITY_SCORE: 37
ADLS_ACUITY_SCORE: 39
ADLS_ACUITY_SCORE: 39
ADLS_ACUITY_SCORE: 37
ADLS_ACUITY_SCORE: 39
ADLS_ACUITY_SCORE: 37
ADLS_ACUITY_SCORE: 39
ADLS_ACUITY_SCORE: 37
ADLS_ACUITY_SCORE: 39
ADLS_ACUITY_SCORE: 39

## 2022-02-23 NOTE — PLAN OF CARE
Goal Outcome Evaluation:    Plan of Care Reviewed With: patient and daughter Víctor.    Outcome Evaluation: Transfers with ceiling lift, refused therapies today. Has taken a few bites of dinner tonight, coughing noted. Has continuous tube feeding. Up to chair x2. Stimulation provided during the day to promote sleeping at night. PRNs have been effective today to reduce anxiety/yelling out. Skin intact. Remains on 1.5L 02. Purewick in place to assist with accurate I&O. Daily weights.

## 2022-02-23 NOTE — PROGRESS NOTES
[facility] PALLIATIVE CARE PROGRESS NOTE        Chief Complaint:  Confusion/anxiety/ s/p CVA in central-lateral clark      Key Palliative Symptom Data:  # Pain severity the last 12 hours: none  # Dyspnea severity the last 12 hours: none  # Nausea severity the last 12 hours: none  # Anxiety severity the last 12 hours: low     Oral Morphine Equivalent Dose:  ZERO    Prognosis, Goals, or Advance Care Planning was addressed today with: Yes.    Mood, coping, and/or meaning in the context of serious illness were addressed today: No.    Chart Review/discussion with clinical unit members: Kae in Care Management    Palliative Encounter Summary/Comments:  I saw Dariela this morning.  She was asleep when I stopped by.  We had a nice conversation as long as I spoke into her right ear.  She denied any significant symptomatology at this time including anxiety.  She did relate that a  had been to see her (I had heard she received the Anointing of the Sick.)  I tried to ask her about her goals of care but she was too confused to provide any guidance on her preferences.    At 1440 I called Dariela's daughter and [facility], Wendi, but only got her vm.  I left a message with a promise to try her again tomorrow to check in on her decision making about her mom discharging to TCU or LTC.      TTS: I have personally spent a total of 15 minutes  today on the unit in review of medical record, consultation with the medical providers and assessment of patient today, with more than 50% of this time spent in counseling for goals of care, coordination of care,  re:  symptom management, risks and benefits of management options, emotional support and development of plan of care.    Bay Fabian MD MS FAAFP  [facility] Palliative Care Service  Office [phone]  Fax [phone]

## 2022-02-23 NOTE — PLAN OF CARE
"Pt is alert to self, disorientated to place, time, or situation. Writer noted pt appears agitated, anxious, and fearful with any cares -- frequent reassurance and explanations provided. Pt yelled and screamed briefly when she was being changed for incontinents of bladder, she stopped once brief change was finished and repositioned in bed.     Pt will scream if lights are turned off in room or if the door or curtains are closed. Pt stated \"I don't want to be alone\". Door kept open and lights kept on overnight per pt's request. Pt likes to listen to classical country music and is frequently listened to it overnight -- pt noted to be more relaxed when listening to music she likes.     Lung sounds are diminished with crackles in the lower bases, pt has an infrequent non-productive cough. Denies any SOB or chest pain. Afebrile, O2 sat 93% on 1.5 lpm via NC.     PEG tube in place and continuous enteral feed at 40 ml/hr with free water flushes 130 ml every 6 hours per order -- tubing and feeding supplies changed at 0400.     Purewick to in place, pt has had approximately 300 ml output. Urine odor is very strong. Pt moves with a lift, requires assist x2 with all cares due to trying to resist.    Bed alarm on for safety.  remains in place.  "

## 2022-02-23 NOTE — PROGRESS NOTES
[facility] HEALTH SERVICES  [facility] - Med/Surg    Referral Source: Daughter's request    - I checked in with Dariela's RN, Callie, who was working as a  for her.  She confirmed that a  had stopped this morning and performed the Anointing of the Sick.    - No further needs were indicated at this time.     Plan:  will remain available for any ongoing support needs during LOS.    Sly Spivey M.A., [facility]  Lead   [facility]  Office: [phone]

## 2022-02-23 NOTE — PROGRESS NOTES
Speech Therapy    Patient is approached for swallow evaluation one two occasions today. Patient is not responsive and barely opens eyes to tactile/verbal stimulation. Not appropriate for evaluation today and will follow up tomorrow.

## 2022-02-23 NOTE — PROGRESS NOTES
"[facility]    Medicine Progress Note - Hospitalist Service    Date of Admission:  2/18/2022    Assessment & Plan            Jamia Coley is a 89 year old female admitted on 2/18/2022.  Patient with dementia who has resided in assisted living facility.  Unfortunately she had a infarct of the central/lateral clark in early January of this year.  This left her dependent for all ADLs and unable to ambulate initially.  Due to dysphagia, a feeding tube was placed and patient was discharged to TCU where she has been recovering since.  Her feeding tube was not functioning and so this was replaced 2/17 and then she presented to the hospital 2/18 with bleeding from the gastrostomy site.    PEG tube site bleeding  Acute blood loss anemia  PEG tube was initially placed on 1/12/2022, but was replaced on 2/17/2022 due to tube clogging.  Hemoglobin decreased from 12.5-10.9 in the emergency department.  On January 28, 2022 hemoglobin was 13.2.  EGD on 2/18/2022 with \"Bleeding ulcer adjacent to intact gastrostomy tube.  This was controlled with 7mL of 1:10,000 epinephrine, 2 MRI conditional hemostatic clips, electrocautery\":    HGB stable  -Continue IV Protonix and Carafate.    Possible aspiration  Acute hypoxic respiratory failure 2/18/22  In the PACU 2/18, patient was requiring increased suctioning.  She was admitted to the intensive care unit overnight due to increased care needs.  Antibiotics were not initiated.  Chest x-ray without any evidence of infiltrate.  Oxygen was able to be weaned off by the afternoon of 2/19/2022.  However, she then had worsening oxygenation requirements that evening up to 4 LPM.  Suspicion is that this is secondary to volume replacement triggering a CHF exacerbation.  She developed a leukocytosis of 12.9 2/20 but this is after receiving steroids evening of 2/18 while in the operating room.  - See below    Acute on chronic diastolc heart failure  History of " cardiomyopathy by echo 1/2022, resolved  Hypertension then hypotension 2/20/2022   Acute hypoxic respiratory failure 2/20/22  Hypertension  Patient was hypotensive morning of 2/19 secondary to her ACE inhibitor in the setting of hypovolemia from her bleeding.  She was given 500 cc LR bolus and this ameliorated her hypotension.  However 2/20/2022 developed evidence of hypervolemia with bilateral rhonchi and worsening peripheral edema and acute hypoxia up to 4 L O2 as outlined above.      At the time of her stroke 1/2022, patient was diagnosed with systolic heart failure with a newly low EF of 25% with global hypokinesis of the LV noted 1/3/2022.  The etiology of her cardiomyopathy was not identified.  She has been maintained on furosemide 40 mg once daily, this was held on admission due to hypotension    BNP  4603.  Procalcitonin 0.15. CXR 2/20/22 - Progressive patchy opacities in both bases likely reflects progressive atelectasis. No effusions or signs of failure. Heart is slightly enlarged but unchanged. Bones are quite demineralized. Received Furosemide 40 mg IV x2 2/21/2022. Held her lisinopril given hypotension requiring IV fluid bolus 2/19. Repeat Echo 2/21/2022 with normalized EF. Gave additional lasix 40 IV x1 2/21/2022     Did not give additional lasix IV 2/22/2022 due to increasing bicarb/alkalosis. Restarted Lisinopril 5mg (usual 20) 2/22/2022.     Procalcitonin 0.09   CRP 85    O2 needs stable 2 LPM, BPs stable, afebrile  Etiology unclear as diuresis did not resolve hypoxemia and has developed alkalosis  Has rales on exam, but No JVD  Cannot rule out ongoing aspiration events   Weight stable, below baseline   - Check viral PCR, repeat Covid, influenza PCR  - No IV lasix today  - Increase lisinopril to 10 mg   - Continue to hold usual Lasix 40  - Quick-look repeat Echo to confirm EF has really improved that much   - I/O, daily weights if able    Dementia with behavioral issues  Depression  Anxiety  While  at TCU, she had debilitating anxiety which she had prior to stroke but seems to have worsened since then.  Seem to be more calm 2/19/2022 and so her scheduled gabapentin 200 TID, Seroquel 12.5 BID, seroquel 6.25 po q6 prn were held to evaluate avoid oversedation but then significantly anxious 2/20/2022, was started on Haldol as needed.    Per daughter patient does not like to be in a room by herself alone during the day, gets fearful. Does better sitting out with the nurses [place]    Agitated last night, this morning sleepy again  Received haldol x2 in past 24 hours for agitation, now somnolent  - Continue prior to admission sertraline 100 mg.    - Increased scheduled gabapentin to 300 TID 2/22/2022   - Change Seroquel form 12.5 BID to 12.5 in AM and 18.725 at bedtime   - Continue seroquel 6.25 q 6 pft prn  - Continue  Haldol 5 mg IV q 6 prn.    Elevated LFTs, mild  CPK, GGT normal  - Check troponin  - Trend    History of CVA  Right hemiparesis  The patient is dependent for all ADLs and transfers.  Per daughter, she recently ambulated 8 feet at the transitional care.  Prior to her stroke in January, she was ambulatory and physically functional although had significant dementia.    Dysphagia  Nutrition PEG tube  Per daughter patient has actually been eating up to 50 to 50% of her meals as of late but she remains on tube feeds at this time.  - Reinitiated tube feeding with her home routine Jevity 1.5 Kiet continuous feeding at 40 cc/hr (will start with 10 cc an hour and advance by 10 every 4 hours until goal), as well as a dysphagia 1 pureed diet with honey thick liquids and free water flushes 130cc every 6 hours.     - Speech consult, patient refused 2/22/2022   - Nutrition consult    Atrial fibrillation  Patient is in atrial fibrillation by EKG on admission and echo 2/21/2022   The patient is not on rate control medication, but rate is controlled.   -Hold prior to admission apixaban due to  "bleeding.    Hypothyroidism  TSH 4.39  - Continue prior to admission levothyroxine.    Glaucoma  - Continue prior to admission Alphagan, Cosopt, and Xalatan.    Goals of care  MD discussed with daughter Víctor 2022.  She wants Jamia to be comfortable.  She says that Jamia has had multiple family members who had fatal strokes but they all  right away.  She says she would be happy if Jamia fell asleep and did not wake up again, but she does not want to treat Jamia \"like an animal and drop her off the vet.\"     After the stroke, the patient's daughter elected to proceed with feeding tube placement.  (There is a palliative care note from her hospitalization at [facility] on 1/3/2022 that seems to imply that they had advised against feeding tube placement.) The patient has had a slow rehab progress at TCU, but has been able to ambulate a few feet on her own recently.Víctor is really struggling watching her mom's slow progress since the stroke. She is DNR/DNI.      Palliative care consultation 2022. Patients daughter reports she would like for patient to be able to do rehab and see how far she may progress. Barriers to transfer to TCU include agitation    # Overweight: Estimated body mass index is 25.62 kg/m  as calculated from the following:    Height as of an earlier encounter on 22: 1.676 m (5' 6\").    Weight as of this encounter: 72 kg (158 lb 11.7 oz).    # Moderate Malnutrition: based on nutrition assessment  % Intake: adequate from TF regimen; does not meet criteria  % Weight Loss: 5% weight loss within one month  Subcutaneous Fat Loss: Unable to assess  Muscle Loss: Unable to assess  Fluid Accumulation/Edema: Mild- Moderate  Malnutrition Diagnosis: Moderate malnutrition in the context of acute on chronic illness      Called Tutu Víctor (Daughter) [phone] and updated       Diet: Adult Formula Drip Feeding: Continuous Jevity 1.5; Gastrostomy; Goal Rate: 40; " mL/hr; Medication - Feeding Tube Flush Frequency: At least 15-30 mL water before and after medication administration and with tube clogging; Initiate at 10 cc/hr and in...  Pureed Diet (level 4) Liquidized/Moderately Thick (level 3)  Snacks/Supplements Adult: Magic Cup; With Meals    DVT Prophylaxis: VTE Prophylaxis contraindicated due to agitation and GI bleed  Richardson Catheter: Not present  Central Lines: None  Cardiac Monitoring: None  Code Status: No CPR- Do NOT Intubate      Disposition Plan   Expected Discharge: 02/23/2022     Anticipated discharge location: inpatient rehabilitation facility (Confirmed bedhold with Mary Ann, staff at [facility])    Delays:     Specialist Consult (enter specialist & decision needed in comments)     Hypoxemia, behaviors, now LFTS       The patient's care was discussed with the Bedside Nurse, Patient and Patient's Family.    Julián Saenz MD  Hospitalist Service  [facility]  Securely message with the Vocera Web Console (learn more here)  Text page via [employer] Paging/Directory         Clinically Significant Risk Factors Present on Admission                    ______________________________________________________________________    Interval History   No acute events      Intake/Output Summary (Last 24 hours) at 2/23/2022 0957  Last data filed at 2/23/2022 0800  Gross per 24 hour   Intake 1160 ml   Output 1075 ml   Net 85 ml        Vitals:    02/21/22 0807 02/21/22 1400 02/22/22 1430   Weight: 70.4 kg (155 lb 1.9 oz) 72 kg (158 lb 11.7 oz) 71 kg (156 lb 8.4 oz)     Wt Readings from Last 4 Encounters:   02/22/22 71 kg (156 lb 8.4 oz)   02/18/22 73.8 kg (162 lb 11.2 oz)   02/16/22 74.7 kg (164 lb 11.2 oz)   02/09/22 73.9 kg (163 lb)         Data reviewed today: I reviewed all medications, new labs and imaging results over the last 24 hours. I personally reviewed no images or EKG's today.    Physical Exam   Vital Signs: Temp: 98.2  F (36.8  C) Temp src: Axillary BP:  (!) 166/72 Pulse: 94   Resp: 18 SpO2: 91 % O2 Device: Nasal cannula Oxygen Delivery: 1.5 LPM  Weight: 156 lbs 8.43 oz  Constitutional: awake, alert, not oriented  Respiratory: Rales bilateral bases  Cardiovascular: regular rate and rhythm?   GI: soft and non-distended  Skin: no rashes  Musculoskeletal: no lower extremity pitting edema present    Data     CMPRecent Labs   Lab 02/23/22  0438 02/22/22  0523 02/21/22 2038 02/21/22  1741 02/21/22  1046 02/21/22  0520 02/20/22  1036 02/20/22  0514 02/18/22 2050 02/18/22  1202    139  --   --   --  142  --  141   < > 138   POTASSIUM 3.8 3.5  --  3.8 3.4 3.3*  --  4.1   < > 4.4   CHLORIDE 100 103  --   --   --  105  --  110*   < > 105   CO2 34* 34*  --   --   --  36*  --  28   < > 28   ANIONGAP <1* 2*  --   --   --  1*  --  3   < > 5   * 129* 140*  --   --  107*   < > 122*   < > 107*   BUN 18 19  --   --   --  18  --  30   < > 41*   CR 0.51* 0.55  --   --   --  0.60  --  0.70   < > 0.63   GFRESTIMATED 89 87  --   --   --  85  --  82   < > 84   KYLE 7.7* 7.5*  --   --   --  7.4*  --  7.9*   < > 8.9   MAG  --   --   --   --  1.9  --   --   --   --   --    PROTTOTAL 5.8* 5.7*  --   --   --   --   --   --   --  6.6*   ALBUMIN 2.3* 2.3*  --   --   --   --   --   --   --  3.0*   BILITOTAL 0.8 0.6  --   --   --   --   --   --   --  0.7   ALKPHOS 56 53  --   --   --   --   --   --   --  71   * 64*  --   --   --   --   --   --   --  16   * 62*  --   --   --   --   --   --   --  32    < > = values in this interval not displayed.     CBC  Recent Labs   Lab 02/23/22  0438 02/22/22  0523 02/21/22  0520 02/20/22  0514   WBC 11.2* 8.1 8.1 12.9*   RBC 2.78* 2.79* 2.80* 3.13*   HGB 9.1* 9.2* 9.2* 10.5*   HCT 29.3* 29.9* 29.7* 34.1*   * 107* 106* 109*   MCH 32.7 33.0 32.9 33.5*   MCHC 31.1* 30.8* 31.0* 30.8*   RDW 15.5* 15.6* 15.8* 16.2*    174 183 211     INR  Recent Labs   Lab 02/18/22  1202   INR 1.27*       Venous Blood Gas  Recent Labs   Lab  02/23/22  0438   PHV 7.44*   PCO2V 51*   PO2V 47   HCO3V 34*   AMAURY 8.1*   O2PER 24

## 2022-02-23 NOTE — PROGRESS NOTES
Care Management Follow Up    Length of Stay (days): 5    Expected Discharge Date: 02/25/2022     Concerns to be Addressed: discharge planning     Patient plan of care discussed at interdisciplinary rounds: Yes    Anticipated Discharge Disposition: Long Term Care  Disposition Comments: Sent LTC referrals 2-23-22  Anticipated Discharge Services: Transportation Services  Anticipated Discharge DME: None    Patient/family educated on Medicare website which has current facility and service quality ratings: yes  Education Provided on the Discharge Plan:  yes  Patient/Family in Agreement with the Plan: yes    Referrals Placed by CM/SW: External Care Coordination, Hospice, Post Acute Facilities, Transportation  Private pay costs discussed: private room/amenity fees and transportation costs    Additional Information:  Per IDT rounds today, MD team states that pt is not medically stable for discharge today [facility] based on pt's behaviors.    MANDY then received phone call from pt's daughter, Víctor, stating she spoke with the MD team & believes that her mom will never be able to participate in TCU cares again & hopes to transition the pt to LTC & will consider enrollment with hospice upon discharge.    Víctor lives 3 miles from the SNF in [place], [place] and shared she prefers this location, but if they will not accept her for cares will only consider [facility] as a second option as her mom is already at that facility.    SW educated on Medicare.gov in relation to how hospice works in a LTC facility.  Víctor strongly encouraged by this & only wants her mom to be comfortable moving forward.  MANDY called & sent referrals for LTC to:  --[facility] (Phone: [phone] Fax: [phone])-- Spoke with Courtney in admissions, reviewing pt for cares & sending to DON for review as well.  Will call back with decision.  --[facility] (Main: [phone] Admissions: [phone] Fax:  [phone])- spoke with Geovanna, admissions coordinator for beds.  She is waiting to hear from facility re: bed availability for pt.     MANDY amezcua MD and Dr Fabian, Palliative Care MD about my conversation with Víctor.  Care Management team to follow for discharge plans.    Kae De La Fuente, RICARDO

## 2022-02-23 NOTE — PROGRESS NOTES
CLINICAL NUTRITION SERVICES  -  BRIEF NOTE     Nutrition Prescription    RECOMMENDATIONS FOR MDs/PROVIDERS TO ORDER:  Recommend starting patient on cyclic feeding schedule to help encourage oral intake through out the day.    Malnutrition Status:    Moderate malnutrition in the context of acute on chronic illness. See full assessment on 2/21    Recommendations already ordered by Registered Dietitian (RD):  Recommend the following TF regimen as below:    1) Jevity 1.5 via PEG at 80 mL/hr x 12 hrs to help encourage daytime oral intake    If K+ and Mag are WNL and Phos >/= 2.0. 80 mL/hr x 12 hrs. Recommend TF times of 8 pm to 8 am.     At goal rate, formula provides total volume of 960 mL, 1,440 kcal (23 kcal/kg), 61 gm Pro (.9 gm/kg), 207 gm CHO, 48 gm fat, 21 gm fiber, and 730 mL free water per DW of 62.7 kg.    2) Free water flushes of 130 mL q 6 hrs. Total fluid (free water + flushes + IVF) = 1,250 mL per day.     3) HOB should be elevated at least 30-40 degrees when supine.    4) RD to order baseline Phos labs     Future/Additional Recommendations:  -Recommend MD decrease or discontinue IVF as TF advances to prevent overhydration.  -RD to adjust free water flushes pending IVF and per MD recommendation pending fluid status.  -Monitor tolerance to cyclic feedings and increase in feeding volume       NUTRITION HISTORY  Per chart review  -Patient disorientated to place, time or situation  -RD already following patient see full assessment 2/21  -RD consulted to help in management of TF   -Patient currently at goal rate of Jevity 40 mL/hr, 130 mL free fluid flushes Q 4. Patient has oral diet orders placed for pureed diet, liquidized/moderately thick. Patient is receiving magic cup at evening meal.   -Patient nutrition pertinent labs are WNL. No updated phos, RD requested baseline labs.  -Patient intakes are few bites, generally 0%.     INTERVENTIONS  Implementation  Collaboration with other providers-IDT rounds  Enteral  Nutrition - Modify rate and schedule see above    Monitoring/Evaluation  Progress toward goals will be monitored and evaluated per protocol.    Corrie Kowalski RDN, LD  Clinical Dietitian  Office: [phone]  Weekend pager: [phone]

## 2022-02-23 NOTE — PROGRESS NOTES
DATE & TIME: 2/23 Days    Ax0x 1- self   Mobility: CL- Q2hr turns. Pt was up in chair from 8am-11am   puree diet -pt is on tube feeding. Coughing while eating food and barely eats anything. Tube feeding will resume at 10pm tonight at 80mL/hr  Voiding: incont. Did not use purewick due to stooling   Last BM: 2/23- pt has black tarry stool due to GI bleed. Pt is incont.    PIV: L forearm    Pain: denies   Skin: coccyx- blanchable.   VS: BP (!) 146/56   Pulse 81   Temp 99.4  F (37.4  C) (Oral)   Resp 18   Wt 71 kg (156 lb 8.4 oz)   SpO2 91%   BMI 25.26 kg/m      Tele: no  O2 status: 1L- tried weaning but pt would drop to 88-89% on RA. Stable at 91% on 1L NC      Barbara Hawthorne RN on 2/23/2022 at 4:33 PM

## 2022-02-24 ENCOUNTER — APPOINTMENT (OUTPATIENT)
Dept: SPEECH THERAPY | Facility: CLINIC | Age: 87
DRG: 377 | End: 2022-02-24
Payer: MEDICARE

## 2022-02-24 LAB
ALBUMIN SERPL-MCNC: 2.6 G/DL (ref 3.4–5)
ALP SERPL-CCNC: 71 U/L (ref 40–150)
ALT SERPL W P-5'-P-CCNC: 270 U/L (ref 0–50)
ANION GAP SERPL CALCULATED.3IONS-SCNC: 5 MMOL/L (ref 3–14)
AST SERPL W P-5'-P-CCNC: 295 U/L (ref 0–45)
BILIRUB SERPL-MCNC: 0.5 MG/DL (ref 0.2–1.3)
BUN SERPL-MCNC: 18 MG/DL (ref 7–30)
CALCIUM SERPL-MCNC: 8.3 MG/DL (ref 8.5–10.1)
CHLORIDE BLD-SCNC: 101 MMOL/L (ref 94–109)
CO2 SERPL-SCNC: 34 MMOL/L (ref 20–32)
CREAT SERPL-MCNC: 0.55 MG/DL (ref 0.52–1.04)
ERYTHROCYTE [DISTWIDTH] IN BLOOD BY AUTOMATED COUNT: 15.3 % (ref 10–15)
GFR SERPL CREATININE-BSD FRML MDRD: 87 ML/MIN/1.73M2
GLUCOSE BLD-MCNC: 106 MG/DL (ref 70–99)
GLUCOSE BLDC GLUCOMTR-MCNC: 125 MG/DL (ref 70–99)
HCT VFR BLD AUTO: 34.2 % (ref 35–47)
HGB BLD-MCNC: 10.7 G/DL (ref 11.7–15.7)
MCH RBC QN AUTO: 33.1 PG (ref 26.5–33)
MCHC RBC AUTO-ENTMCNC: 31.3 G/DL (ref 31.5–36.5)
MCV RBC AUTO: 106 FL (ref 78–100)
PHOSPHATE SERPL-MCNC: 2.8 MG/DL (ref 2.5–4.5)
PLATELET # BLD AUTO: 240 10E3/UL (ref 150–450)
POTASSIUM BLD-SCNC: 3.3 MMOL/L (ref 3.4–5.3)
PROT SERPL-MCNC: 6.5 G/DL (ref 6.8–8.8)
RBC # BLD AUTO: 3.23 10E6/UL (ref 3.8–5.2)
SODIUM SERPL-SCNC: 140 MMOL/L (ref 133–144)
WBC # BLD AUTO: 10.5 10E3/UL (ref 4–11)

## 2022-02-24 PROCEDURE — 250N000013 HC RX MED GY IP 250 OP 250 PS 637: Performed by: INTERNAL MEDICINE

## 2022-02-24 PROCEDURE — 250N000011 HC RX IP 250 OP 636: Performed by: INTERNAL MEDICINE

## 2022-02-24 PROCEDURE — C9113 INJ PANTOPRAZOLE SODIUM, VIA: HCPCS | Performed by: INTERNAL MEDICINE

## 2022-02-24 PROCEDURE — 99232 SBSQ HOSP IP/OBS MODERATE 35: CPT | Performed by: INTERNAL MEDICINE

## 2022-02-24 PROCEDURE — 120N000001 HC R&B MED SURG/OB

## 2022-02-24 PROCEDURE — 85027 COMPLETE CBC AUTOMATED: CPT | Performed by: INTERNAL MEDICINE

## 2022-02-24 PROCEDURE — 80053 COMPREHEN METABOLIC PANEL: CPT | Performed by: INTERNAL MEDICINE

## 2022-02-24 PROCEDURE — 99232 SBSQ HOSP IP/OBS MODERATE 35: CPT | Performed by: FAMILY MEDICINE

## 2022-02-24 PROCEDURE — 84100 ASSAY OF PHOSPHORUS: CPT | Performed by: INTERNAL MEDICINE

## 2022-02-24 PROCEDURE — 36415 COLL VENOUS BLD VENIPUNCTURE: CPT | Performed by: INTERNAL MEDICINE

## 2022-02-24 PROCEDURE — 92610 EVALUATE SWALLOWING FUNCTION: CPT | Mod: GN | Performed by: SPEECH-LANGUAGE PATHOLOGIST

## 2022-02-24 RX ORDER — DEXTROSE MONOHYDRATE 100 MG/ML
INJECTION, SOLUTION INTRAVENOUS CONTINUOUS PRN
Status: DISCONTINUED | OUTPATIENT
Start: 2022-02-24 | End: 2022-02-28 | Stop reason: HOSPADM

## 2022-02-24 RX ORDER — POTASSIUM CHLORIDE 20MEQ/15ML
40 LIQUID (ML) ORAL ONCE
Status: COMPLETED | OUTPATIENT
Start: 2022-02-24 | End: 2022-02-24

## 2022-02-24 RX ADMIN — PANTOPRAZOLE SODIUM 40 MG: 40 INJECTION, POWDER, FOR SOLUTION INTRAVENOUS at 07:44

## 2022-02-24 RX ADMIN — SUCRALFATE 1 G: 1 SUSPENSION ORAL at 15:12

## 2022-02-24 RX ADMIN — GABAPENTIN 300 MG: 300 CAPSULE ORAL at 15:12

## 2022-02-24 RX ADMIN — DORZOLAMIDE HYDROCHLORIDE AND TIMOLOL MALEATE 1 DROP: 20; 5 SOLUTION OPHTHALMIC at 07:44

## 2022-02-24 RX ADMIN — SUCRALFATE 1 G: 1 SUSPENSION ORAL at 21:40

## 2022-02-24 RX ADMIN — SUCRALFATE 1 G: 1 SUSPENSION ORAL at 05:55

## 2022-02-24 RX ADMIN — LEVOTHYROXINE SODIUM 150 MCG: 0.15 TABLET ORAL at 17:14

## 2022-02-24 RX ADMIN — LISINOPRIL 10 MG: 10 TABLET ORAL at 07:44

## 2022-02-24 RX ADMIN — BRIMONIDINE TARTRATE 1 DROP: 2 SOLUTION/ DROPS OPHTHALMIC at 07:44

## 2022-02-24 RX ADMIN — LANSOPRAZOLE 30 MG: 15 TABLET, ORALLY DISINTEGRATING, DELAYED RELEASE ORAL at 15:12

## 2022-02-24 RX ADMIN — ROSUVASTATIN CALCIUM 5 MG: 5 TABLET, FILM COATED ORAL at 07:44

## 2022-02-24 RX ADMIN — POTASSIUM CHLORIDE 40 MEQ: 1.5 SOLUTION ORAL at 15:12

## 2022-02-24 RX ADMIN — GABAPENTIN 300 MG: 300 CAPSULE ORAL at 21:39

## 2022-02-24 RX ADMIN — Medication 18.75 MG: at 21:39

## 2022-02-24 RX ADMIN — QUETIAPINE 12.5 MG: 25 TABLET ORAL at 07:44

## 2022-02-24 RX ADMIN — GABAPENTIN 300 MG: 300 CAPSULE ORAL at 07:44

## 2022-02-24 RX ADMIN — SUCRALFATE 1 G: 1 SUSPENSION ORAL at 11:30

## 2022-02-24 RX ADMIN — SERTRALINE HYDROCHLORIDE 100 MG: 20 SOLUTION ORAL at 07:47

## 2022-02-24 ASSESSMENT — ACTIVITIES OF DAILY LIVING (ADL)
ADLS_ACUITY_SCORE: 39
ADLS_ACUITY_SCORE: 41
ADLS_ACUITY_SCORE: 39
ADLS_ACUITY_SCORE: 41
ADLS_ACUITY_SCORE: 41
ADLS_ACUITY_SCORE: 39
ADLS_ACUITY_SCORE: 39
ADLS_ACUITY_SCORE: 41
ADLS_ACUITY_SCORE: 41
ADLS_ACUITY_SCORE: 39
ADLS_ACUITY_SCORE: 39
ADLS_ACUITY_SCORE: 41
ADLS_ACUITY_SCORE: 39
ADLS_ACUITY_SCORE: 41
ADLS_ACUITY_SCORE: 39
ADLS_ACUITY_SCORE: 39

## 2022-02-24 NOTE — PLAN OF CARE
DATE & TIME: 2/23 7P-7A    Cognitive Concerns/ Orientation : Alert but confused, oriented to self . Hard of hearing, using the pocket talker to aid in communication   BEHAVIOR & AGGRESSION TOOL COLOR: Green, cooperative and calm   ABNL VS/O2: VSS on 1 L NC  MOBILITY: Lift, repositioned in bed. Stood at bedside with assist of 2, gait belt and walker but right side weak.   PAIN MANAGEMENT: Denies pain   DIET: Pureed with honey thick liquids. TF at 80/hr overnight with 130 ml flushes Q6H, started at 2130 - HOB 30 degrees  BOWEL/BLADDER: Incontinent liquid BM 2/24. Partially black/tarry, partially light brown/TF color. Purewick in place overnight but removed at 0600.   ABNL LAB/BG: Phos 2.0 (was replaced earlier and will recheck in AM), BGM 96 prior to starting TF, 125 after 4 hours. ALT and AST elevated, Hgb 9.1  DRAIN/DEVICES: PIV SL, PEG   TELEMETRY RHYTHM: n/a   SKIN: Pale. Mepilex on bilateral posterior thighs CDI. Bruises on bilateral hands  TESTS/PROCEDURES: Speech to attempt eval again   D/C DATE: pending   OTHER IMPORTANT INFO: +3 edema of right arm, +2 edema of bilateral hips. PCD on. Refused eye drops at HS.

## 2022-02-24 NOTE — PLAN OF CARE
Physical Therapy Discharge Summary    Reason for therapy discharge:    No further expectations of functional progress. Pt will be going to SNF and enrolling in hospice    Progress towards therapy goal(s). See goals on Care Plan in Epic electronic health record for goal details.  Goals not met.  Barriers to achieving goals:   limited tolerance for therapy.

## 2022-02-24 NOTE — PROGRESS NOTES
"[facility]    Hospitalist Progress Note    Date of Service (when I saw the patient): 02/24/2022    Assessment & Plan   Jamia Coley is a 89 year old female admitted on 2/18/2022.  Patient with dementia who has resided in assisted living facility.  Unfortunately she had a infarct of the central/lateral clark in early January of this year.  This left her dependent for all ADLs and unable to ambulate initially.  Due to dysphagia, a feeding tube was placed and patient was discharged to TCU where she has been recovering since.  Her feeding tube was not functioning and so this was replaced 2/17 and then she presented to the hospital 2/18 with bleeding from the gastrostomy site.     PEG tube site bleeding  Acute blood loss anemia  PEG tube was initially placed on 1/12/2022, but was replaced on 2/17/2022 due to tube clogging.  Hemoglobin decreased from 12.5-10.9 in the emergency department.  On January 28, 2022 hemoglobin was 13.2.  EGD on 2/18/2022 with \"Bleeding ulcer adjacent to intact gastrostomy tube.  This was controlled with 7mL of 1:10,000 epinephrine, 2 MRI conditional hemostatic clips, electrocautery\":     HGB stable today at 10.7  --Continue Carafate.   --Change IV Protonix to omeprazole suspension bid     Possible aspiration  Acute hypoxic respiratory failure 2/18/22  In the PACU 2/18, patient was requiring increased suctioning.  She was admitted to the intensive care unit overnight due to increased care needs.  Antibiotics were not initiated.  Chest x-ray without any evidence of infiltrate.  Oxygen was able to be weaned off by the afternoon of 2/19/2022.  However, she then had worsening oxygenation requirements that evening up to 4 LPM.  Suspicion is that this is secondary to volume replacement triggering a CHF exacerbation.  She developed a leukocytosis of 12.9 2/20 but this is after receiving steroids evening of 2/18 while in the operating room.  - See below     Acute on " chronic diastolc heart failure  History of cardiomyopathy by echo 1/2022, resolved  Hypertension then hypotension 2/20/2022   Acute hypoxic respiratory failure 2/20/22  Hypertension  Patient was hypotensive morning of 2/19 secondary to her ACE inhibitor in the setting of hypovolemia from her bleeding.  She was given 500 cc LR bolus and this ameliorated her hypotension.  However 2/20/2022 developed evidence of hypervolemia with bilateral rhonchi and worsening peripheral edema and acute hypoxia up to 4 L O2 as outlined above.       At the time of her stroke 1/2022, patient was diagnosed with systolic heart failure with a newly low EF of 25% with global hypokinesis of the LV noted 1/3/2022.  The etiology of her cardiomyopathy was not identified.  She has been maintained on furosemide 40 mg once daily, this was held on admission due to hypotension     BNP  4603.  Procalcitonin 0.15. CXR 2/20/22 - Progressive patchy opacities in both bases likely reflects progressive atelectasis. No effusions or signs of failure. Heart is slightly enlarged but unchanged. Bones are quite demineralized. Received Furosemide 40 mg IV x2 2/21/2022. Held her lisinopril given hypotension requiring IV fluid bolus 2/19. Repeat Echo 2/21/2022 with normalized EF. Gave additional lasix 40 IV x1 2/21/2022      Did not give additional lasix IV 2/22/2022 due to increasing bicarb/alkalosis. Restarted Lisinopril 5mg (usual 20) 2/22/2022.      Procalcitonin 0.09   CRP 85     O2 needs stable 2 LPM, BPs stable, afebrile  Etiology unclear as diuresis did not resolve hypoxemia and has developed alkalosis  Has rales on exam, but No JVD  Cannot rule out ongoing aspiration events   Weight stable, below baseline   - Check viral PCR, repeat Covid, influenza PCR  - No IV lasix today  - Increase lisinopril to 10 mg twice daily  - Continue to hold usual Lasix 40  - Echo demonstrates improved EF, now 50-55%, normal RV, PAP > 40 mmHg with RAP > 15 mmHg  - I/O, daily  weights if able  - Consider resuming lasix at discharge if wt increasing     Dementia with behavioral issues  Depression  Anxiety  While at TCU, she had debilitating anxiety which she had prior to stroke but seems to have worsened since then.  Seem to be more calm 2/19/2022 and so her scheduled gabapentin 200 TID, Seroquel 12.5 BID, seroquel 6.25 po q6 prn were held to evaluate avoid oversedation but then significantly anxious 2/20/2022, was started on Haldol as needed.     Per daughter patient does not like to be in a room by herself alone during the day, gets fearful. Does better sitting out with the nurses Flaca     Agitated last night, this morning sleepy again  Received haldol x2 in past 24 hours for agitation, now somnolent  - Continue prior to admission sertraline 100 mg.    - Increased scheduled gabapentin to 300 TID 2/22/2022   - Change Seroquel form 12.5 BID to 12.5 in AM and 18.725 at bedtime   - Continue seroquel 6.25 q 6 pft prn  - Continue Haldol 5 mg IV q 6 prn.     Elevated LFTs, mild  CPK, GGT normal  - Check troponin  - Trend     History of CVA  Right hemiparesis  The patient is dependent for all ADLs and transfers.  Per daughter, she recently ambulated 8 feet at the transitional care.  Prior to her stroke in January, she was ambulatory and physically functional although had significant dementia.     Dysphagia  Nutrition PEG tube  Per daughter patient has actually been eating up to 50 to 50% of her meals as of late but she remains on tube feeds at this time.  - Reinitiated tube feeding with her home routine Jevity 1.5 Kiet continuous feeding at 40 cc/hr (will start with 10 cc an hour and advance by 10 every 4 hours until goal), as well as a dysphagia 1 pureed diet with honey thick liquids and free water flushes 130cc every 6 hours.     - Speech consult on 2/24 unable to be completed due to lack of dentures, but recommending continued mod thick liquids and pureed diet despite continued cough present  "on  bites.  - Nutrition consult to convert Jevity 1.5 to Osmolite 1.5 at discharge     Atrial fibrillation  Patient is in atrial fibrillation by EKG on admission and echo 2022   The patient is not on rate control medication, but rate is controlled.   -Hold prior to admission apixaban due to bleeding.     Hypothyroidism  TSH 4.39  - Continue prior to admission levothyroxine.     Glaucoma  - Continue prior to admission Alphagan, Cosopt, and Xalatan.     Goals of care  MD discussed with daughter Víctor 2022.  She wants Jamia to be comfortable.  She says that Jamia has had multiple family members who had fatal strokes but they all  right away.  She says she would be happy if Jamia fell asleep and did not wake up again, but she does not want to treat Jamia \"like an animal and drop her off the vet.\"     After the stroke, the patient's daughter elected to proceed with feeding tube placement.  (There is a palliative care note from her hospitalization at [facility] on 1/3/2022 that seems to imply that they had advised against feeding tube placement.) The patient has had a slow rehab progress at TCU, but has been able to ambulate a few feet on her own recently.Víctor is really struggling watching her mom's slow progress since the stroke. She is DNR/DNI.       Palliative care consultation 2022. Patients daughter reports she would like for patient to be able to do rehab and see how far she may progress. Barriers to transfer to TCU include agitation     # Overweight: Estimated body mass index is 25.62 kg/m  as calculated from the following:    Height as of an earlier encounter on 22: 1.676 m (5' 6\").    Weight as of this encounter: 72 kg (158 lb 11.7 oz).     # Moderate Malnutrition: based on nutrition assessment  % Intake: adequate from TF regimen; does not meet criteria  % Weight Loss: 5% weight loss within one month  Subcutaneous Fat Loss: Unable to assess  Muscle " Loss: Unable to assess  Fluid Accumulation/Edema: Mild- Moderate  Malnutrition Diagnosis: Moderate malnutrition in the context of acute on chronic illness    Diet: Adult Formula Drip Feeding: Continuous Jevity 1.5; Gastrostomy; Goal Rate: 40; mL/hr; Medication - Feeding Tube Flush Frequency: At least 15-30 mL water before and after medication administration and with tube clogging; Initiate at 10 cc/hr and in...  Pureed Diet (level 4) Liquidized/Moderately Thick (level 3)  Snacks/Supplements Adult: Magic Cup; With Meals    DVT Prophylaxis: VTE Prophylaxis contraindicated due to agitation and GI bleed  Richardson Catheter: Not present  Central Lines: None  Cardiac Monitoring: None  Code Status: No CPR- Do NOT Intubate      Disposition: Expected discharge tomorrow with Hospice.  Talked with Víctor Cam (Daughter) [phone] and updated    Sly Griffin    Interval History   Patient sitting in her room.  She was anxious earlier today, but mood has improved after daughter arrived.  No acute complaints.    -Data reviewed today: I reviewed all new labs and imaging results over the last 24 hours. I personally reviewed no images or EKG's today.    Physical Exam   Temp: 98  F (36.7  C) Temp src: Oral BP: (!) 154/68 Pulse: 88   Resp: 20 SpO2: 94 % O2 Device: Nasal cannula Oxygen Delivery: 1 LPM  Vitals:    02/21/22 1400 02/22/22 1430 02/24/22 0600   Weight: 72 kg (158 lb 11.7 oz) 71 kg (156 lb 8.4 oz) 69.5 kg (153 lb 3.5 oz)     Vital Signs with Ranges  Temp:  [98  F (36.7  C)-98.2  F (36.8  C)] 98  F (36.7  C)  Pulse:  [79-98] 88  Resp:  [18-20] 20  BP: (134-159)/(55-72) 154/68  SpO2:  [91 %-94 %] 94 %  I/O last 3 completed shifts:  In: 942 [NG/GT:942]  Out: 400 [Urine:400]    Gen: Kyphotic debilitated elderly female, no acute distressed  HEENT: Atraumatic, normocephalic; sclera non-injected, anicterric; oral mucosa moist, no lesion, no exudate  Lungs: Clear to ausculation, no wheezes, no rhonchi, no rales  Heart:  Regular rate, regular rhythm, no gallops, no rubs, 2/6 ARIANE  GI: Bowel sound normal, no hepatosplenomegaly, no masses, non-tender, non-distended, no guarding, no rebound tenderness  Lymph: No lymphadenopathy, no edema  Skin: No rashes, no chronic venous stasis     Medications       brimonidine  1 drop Left Eye BID     dorzolamide-timolol  1 drop Left Eye BID     gabapentin  300 mg Per G Tube TID     latanoprost  1 drop Left Eye At Bedtime     levothyroxine  150 mcg Per G Tube QPM     lisinopril  10 mg Oral or Feeding Tube Daily     [Held by provider] lisinopril  20 mg Per G Tube Daily     pantoprazole (PROTONIX) IV  40 mg Intravenous Q12H     QUEtiapine  12.5 mg Per G Tube Daily     QUEtiapine  18.75 mg Per G Tube At Bedtime     rosuvastatin  5 mg Per G Tube Daily     sertraline  100 mg Per G Tube Daily     sucralfate  1 g Per G Tube 4x Daily AC & HS       Data   Recent Labs   Lab 02/24/22  0616 02/24/22  0107 02/23/22  2054 02/23/22  0438 02/22/22  0523 02/18/22  1516 02/18/22  1202   WBC 10.5  --   --  11.2* 8.1   < > 9.6   HGB 10.7*  --   --  9.1* 9.2*   < > 12.5   *  --   --  105* 107*   < > 105*     --   --  191 174   < > 238   INR  --   --   --   --   --   --  1.27*     --   --  134 139   < > 138   POTASSIUM 3.3*  --   --  3.8 3.5   < > 4.4   CHLORIDE 101  --   --  100 103   < > 105   CO2 34*  --   --  34* 34*   < > 28   BUN 18  --   --  18 19   < > 41*   CR 0.55  --   --  0.51* 0.55   < > 0.63   ANIONGAP 5  --   --  <1* 2*   < > 5   KYLE 8.3*  --   --  7.7* 7.5*   < > 8.9   * 125* 96 127* 129*   < > 107*   ALBUMIN 2.6*  --   --  2.3* 2.3*  --  3.0*   PROTTOTAL 6.5*  --   --  5.8* 5.7*  --  6.6*   BILITOTAL 0.5  --   --  0.8 0.6  --  0.7   ALKPHOS 71  --   --  56 53  --  71   *  --   --  113* 62*  --  32   *  --   --  118* 64*  --  16    < > = values in this interval not displayed.       No results found for this or any previous visit (from the past 24 hour(s)).

## 2022-02-24 NOTE — PROGRESS NOTES
Care Management Follow Up    Length of Stay (days): 6    Expected Discharge Date: 02/25/2022     Concerns to be Addressed: discharge planning     Patient plan of care discussed at interdisciplinary rounds: Yes    Anticipated Discharge Disposition: Long Term Care    Anticipated Discharge Services: Transportation Services; Hospice    Anticipated Discharge DME: None    Patient/family educated on Medicare website which has current facility and service quality ratings: yes  Education Provided on the Discharge Plan:  yes  Patient/Family in Agreement with the Plan: yes    Referrals Placed by CM/SW: External Care Coordination, Hospice, Post Acute Facilities, Transportation  Private pay costs discussed: private room/amenity fees and transportation costs    Additional Information:  Per IDT rounds today, MD team states that pt is medically stable for discharge once a LTC bed and hospice agency is secured, based on daughter's   Pt is accepted at [facility] (Phone: [phone] Fax: [phone]) if the pt will enroll with hospice at time of admission to the SNF due to feeding tube, aspirtation risks and overall decline in health.  MANDY shared that pt's daughter, Víctor, called this writer yesterday & requested information on hospice & stated she wants her mom to enroll with hospice and to only focus on comfort in the future.  MANDY met with pt and Víctor at bedside and informed about acceptance to SNF.  Víctor expressed gratitude and is thrilled with the location as it's only 3 miles from her home.  Discussed hospice agencies & Medicare choice.  [facility] was chosen.  MANDY placed call to [facility] (phone [phone]/fax: [phone]), spoke with Claudia in intake & they are able to accept pt for cares & will open tomorrow upon admission to [facility].  MANDY sent referral & MD to place orders.  Transportation discussed and [facility] stretcher needs to be used due to pt's new oxygen  with inability to maintain on own & inability to pivot transfer.  Víctor is aware of costs associated with [facility] transportation services.  SW completed the PCS form.    Plan:  2/25/22 at 11:00 AM--  Pt to discharge to [facility] [place] via [facility] stretcher with oxygen.  Pt will enroll with [facility] at time of admission to SNF.    RICARDO Calderon

## 2022-02-24 NOTE — PROGRESS NOTES
Impression: Oral [facility] exam unable to be completed. Patient owns dentures but Daughter believes they are still at TCU. PO trials of nectar thick result in vocal quality change, throat clear, and prolongued oral phase. Patient reports she is hungry. She eats 50% of lunch tray. She demonstrates cough after final bite attempted. She clears with moderately thick liquids. Cough suspected to have been the result of prior liquid trials. Cough present only in 1/~12 trials of puree. Recommend continuation of moderately thick liquids (3) and pureed diet (4) and follow up ST services at discharge location.   Bedside Swallow Study  02/24/22   General Information   Pertinent History of Current Problem had L pontine and R occipital stroke recently, was at TCU, had Gtube placed and was noted to have blood in it and was sent in for evaluation for GIB, Hgb today 10.5, was 8.7 yesterday, pt with dementia and anxiety. Per Daughter, Patient was on nectar thick liquids prior to admission.   General Observations Patient is seated upright in bed during lunch. Her daughter is in the room as well.   Past History of Dysphagia Patient currently has feeding tube. Was on nectar thick liquids and regular diet prior to most recent stroke.   Type of Evaluation   Type of Evaluation Swallow Evaluation   Oral Motor   Oral Musculature unable to assess due to poor participation/comprehension   Mucosal Quality sticky   Dentition (Oral Motor)   Dentition (Oral Motor) edentulous;other (see comments)  (has dentures but they are not present)   Lip Function (Oral Motor)   Comment, Lip Function (Oral Motor) Patient demonstrates sufficent strength to clear 80% of bolus puree from spoon.   Tongue Function (Oral Motor)   Comment, Tongue Function (Oral Motor) decreased strength and coordination during bolus management of liquids and puree   Jaw Function (Oral Motor)   Comment, Jaw Function (Oral Motor) WFL   Vocal Quality/Secretion Management (Oral Motor)   Vocal  Quality (Oral Motor) WFL   Secretion Management (Oral Motor) WNL   General Swallowing Observations   Current Diet/Method of Nutritional Intake (General Swallowing Observations, NIS) moderately thick liquids/liquidized (level 3);pureed (level 4)   Respiratory Support (General Swallowing Observations) nasal cannula   Swallowing Evaluation Clinical swallow evaluation   Clinical Swallow Evaluation   Feeding Assistance set up only required   Clinical Swallow Evaluation Textures Trialed mildly thick liquids;moderately thick liquids/liquidized;pureed   Clinical Swallow Eval: Mildly Thick Liquids   Mode of Presentation cup;self-fed   Volume Presented 3 oz   Oral Phase delayed AP movement;effortful AP movement   Pharyngeal Phase impaired;repeated swallows;wet vocal quality after swallow   Diagnostic Statement aspiration suspected   Clinical Swallow Eval: Moderately Thick Liquids   Mode of Presentation cup;self-fed   Volume Presented 3 oz   Oral Phase delayed AP movement;effortful AP movement   Pharyngeal Phase repeated swallows   Diagnostic Statement Patient demonstrates prolonged hold for first 2 swallows and multiple swallows. Following cough after bite of puree, Patient washes with moderately thick liquids. These next swallows result in no prolonged hold or other s/s of dysphagia.   Clinical Swallow Evaluation: Puree Solid Texture Trial   Mode of Presentation, Puree spoon;self-fed   Volume of Puree Presented 50% of breakfast tray   Oral Phase, Puree WFL;residue in oral cavity   Pharyngeal Phase, Puree intact;coughing/choking   Diagnostic Statement Patient reports she is hungry. She eat 50% of tray. She demonstrates cough after final bite attempted. She clears with moderately thick liquids. Cough suspected to have been the result of prior liquid trials. Cough present only in 1/~15 trials.   Swallowing Recommendations   Diet Consistency Recommendations moderately thick liquids/liquidized (level 3);pureed (level 4)    Supervision Level for Intake distant supervision needed   Swallowing Maneuver Recommendations alternate food and liquid intake   Monitoring/Assistance Required (Eating/Swallowing) stop eating activities when fatigue is present   Clinical Impression   Criteria for Skilled Therapeutic Interventions Met (SLP Heather) Evaluation only   Clinical Impression Comments Oral [facility] exam unable to be completed. Patient owns dentures but Daughter believes they are still at TCU. PO trials of nectar thick result in vocal quality change, throat clear, and prolongued oral phase. Patient reports she is hungry. She eats 50% of lunch tray. She demonstrates cough after final bite attempted. She clears with moderately thick liquids. Cough suspected to have been the result of prior liquid trials. Cough present only in 1/~12 trials of puree. Recommend continuation of moderately thick liquids (3) and pureed diet (4) and follow up ST services at discharge location.    Total Evaluation Time   Total Evaluation Time (Minutes) 30   Psychosocial Support   Trust Relationship/Rapport care explained;empathic listening provided;questions answered;questions encouraged

## 2022-02-24 NOTE — PROGRESS NOTES
CLINICAL NUTRITION SERVICES  -  BRIEF NOTE    NUTRITION HISTORY  -RD received a call from the MD to see a comparison of tube feeding formulas for this patient. If switching to Osmolite 1.5 instead of Jevity. See comparison outlined below     1) Current regimen of Jevity 1.5 via PEG at 80 mL/hr x 12 hrs to help encourage daytime oral intake.     At goal rate, formula provides total volume of 960 mL, 1,440 kcal (23 kcal/kg), 61 gm Pro (.9 gm/kg), 207 gm CHO, 48 gm fat, 21 gm fiber, and 730 mL free water per DW of 62.7 kg.     Free water flushes of 130 mL q 6 hrs. Total fluid (free water + flushes + IVF) = 1,250 mL per day.      2) Alternative regimen of Osmolite 1.5 via PEG at 80 mL/hr X 12 hr to help encourage daytime oral intake.    At goal rate, formula provides total volume of 960 mL, 1,440 kcal (23 kcal/kg), 60 gm Pro (.9 gm/kg), 195 gm CHO, 47 gm fat, 0 gm fiber, and 731 mL free water per DW of 62.7 kg.    Free water flushes of 130 mL q 6 hrs. Total fluid (free water + flushes + IVF) = 1,251 mL per day    Minimal difference in tube feeding composition aside from fiber and 12 gm less of carbohydrate-this is only a 48 kcal difference.    Monitoring/Evaluation  Progress toward goals will be monitored and evaluated per protocol.    Corrie Kowalski RDN, OLIVIA  Clinical Dietitian  Office: [phone]  Weekend pager: [phone]

## 2022-02-24 NOTE — PROGRESS NOTES
DATE & TIME: 2/24 Days    Ax0x 1-self   Mobility: CL- pt sat in chair x2 today   puree diet - pt is on tube feedings NOC   Voiding: incont. 3 wet briefs throughout shift   Last BM: 2/24- tarry incont stool.    PIV: L- saline locked   Pain: R arm pain- otherwise denies   Skin: edema in R arm, mepilex on back of thighs, using baby powder inbetween thighs due to raw skin.   VS: /65   Pulse 87   Temp 99.1  F (37.3  C) (Axillary)   Resp 20   Wt 69.5 kg (153 lb 3.5 oz)   SpO2 99%   BMI 24.73 kg/m      Tele: no  O2 status: 1L- tried to ween pt off and failed x2 today.   Labs: K protocol- replaced at 1500- recheck lab in place.     Barbara Hawthorne RN on 2/24/2022 at 5:02 PM

## 2022-02-24 NOTE — PROGRESS NOTES
[facility] PALLIATIVE CARE PROGRESS NOTE        Chief Complaint: GI bleed 2/2/ G tube; dementia; recent clark CVA    Assessment/Recommendations:  1)   GOALS OF CARE per daughter, HCA    Current plan is for Dariela to be discharged to a long term care facility tomorrow and her daughter will enroll her in [facility] after she gets settled there.    Code Status: DNR/DNI    2)    ADVANCED CARE PLANNING    Patient has completed health care directive:  yes    Documented health care agent:  Daughter Wendi and the HCA has been activated    Surrogate decision maker if no appointed agent:  n/a    3)    SYMPTOM MANAGEMENT      We are not currently helping to manage symptoms in this patient    No unmet symptom burden identified    4)    PSYCHOSOCIAL/SPIRITUAL SUPPORT    I provided some emotional support to Wendi today and shared some anticipatory guidance about feeding tubes at the end of  Life and how while people at dying they don't experience 'hunger' like the rest of us do.    These recommendations have been discussed with Dr. Griffin and Kae in Care Management.      Thank you for the opportunity to participate in the care of this patient and family.      During regular M-F work hours -- if you are not sure who specifically to contact -- please contact us by calling [phone].      Key Palliative Symptom Data:  # Pain severity the last 12 hours: none  # Dyspnea severity the last 12 hours: none  # Nausea severity the last 12 hours: none  # Anxiety severity the last 12 hours: none    Oral Morphine Equivalent Dose:  ZERO    Prognosis, Goals, or Advance Care Planning was addressed today with: Yes.    Mood, coping, and/or meaning in the context of serious illness were addressed today: Yes with daughter    Palliative Encounter Summary/Comments:  I saw Dariela this afternoon while Karmen from [facility] was working with her while she ate.  Her daughter Wendi was at the bedside.  Wendi is relieved and resolved to proceed with  a plan for her mom to discharge to a :TC facility tomorrow and then enroll her mom in [facility].  Wendi feels that this is in keeping with her mom's goals and she feels no guilt about this decision.  She did acknowledge that this whole episode from last January's CVA has been stressful and she plans to reach out to her PCP to see about adjusting her Zoloft dose.  We spent some time talking about some issues that may come up towards the end of her mom's life especially regarding her G tube.  While we were visiting, Dariela had an aspiration event and Wendi confided to me that her dad  of aspiration pneumonia in the setting of debilitating colon cancer and it scares her to see her mom choke.Wendi also did a nice jog of [employer] recalling for me how much her mom liked getting in Wendi's [employer]  and driving around the [place] of [facility].  Both Wendi and Dariela expressed gratitude for the care Dariela has received here.    TTS: I have personally spent a total of 25 minutes  today on the unit in review of medical record, consultation with the medical providers and assessment of patient today, with more than 50% of this time spent in counseling, coordination of care around discharge planning, and conversation with daughter at the bedside re:  symptom management,   emotional support and development of plan of care.    Bay Fabian MD MS FAAFP  [facility]  Office [phone]  Fax [phone]

## 2022-02-25 ENCOUNTER — APPOINTMENT (OUTPATIENT)
Dept: CT IMAGING | Facility: CLINIC | Age: 87
DRG: 377 | End: 2022-02-25
Attending: INTERNAL MEDICINE
Payer: MEDICARE

## 2022-02-25 LAB
ALBUMIN SERPL-MCNC: 2.4 G/DL (ref 3.4–5)
ALP SERPL-CCNC: 73 U/L (ref 40–150)
ALT SERPL W P-5'-P-CCNC: 366 U/L (ref 0–50)
ANION GAP SERPL CALCULATED.3IONS-SCNC: 5 MMOL/L (ref 3–14)
AST SERPL W P-5'-P-CCNC: 346 U/L (ref 0–45)
BILIRUB SERPL-MCNC: 0.5 MG/DL (ref 0.2–1.3)
BUN SERPL-MCNC: 19 MG/DL (ref 7–30)
CALCIUM SERPL-MCNC: 8.1 MG/DL (ref 8.5–10.1)
CHLORIDE BLD-SCNC: 103 MMOL/L (ref 94–109)
CO2 SERPL-SCNC: 30 MMOL/L (ref 20–32)
CREAT SERPL-MCNC: 0.53 MG/DL (ref 0.52–1.04)
ERYTHROCYTE [DISTWIDTH] IN BLOOD BY AUTOMATED COUNT: 15.3 % (ref 10–15)
GFR SERPL CREATININE-BSD FRML MDRD: 88 ML/MIN/1.73M2
GLUCOSE BLD-MCNC: 159 MG/DL (ref 70–99)
HCT VFR BLD AUTO: 32.4 % (ref 35–47)
HGB BLD-MCNC: 9.9 G/DL (ref 11.7–15.7)
LIPASE SERPL-CCNC: 127 U/L (ref 73–393)
MCH RBC QN AUTO: 32.5 PG (ref 26.5–33)
MCHC RBC AUTO-ENTMCNC: 30.6 G/DL (ref 31.5–36.5)
MCV RBC AUTO: 106 FL (ref 78–100)
PHOSPHATE SERPL-MCNC: 3 MG/DL (ref 2.5–4.5)
PLATELET # BLD AUTO: 255 10E3/UL (ref 150–450)
POTASSIUM BLD-SCNC: 4.2 MMOL/L (ref 3.4–5.3)
PROT SERPL-MCNC: 6.1 G/DL (ref 6.8–8.8)
RBC # BLD AUTO: 3.05 10E6/UL (ref 3.8–5.2)
SODIUM SERPL-SCNC: 138 MMOL/L (ref 133–144)
WBC # BLD AUTO: 10.7 10E3/UL (ref 4–11)

## 2022-02-25 PROCEDURE — 250N000013 HC RX MED GY IP 250 OP 250 PS 637: Performed by: INTERNAL MEDICINE

## 2022-02-25 PROCEDURE — 84100 ASSAY OF PHOSPHORUS: CPT | Performed by: INTERNAL MEDICINE

## 2022-02-25 PROCEDURE — 99232 SBSQ HOSP IP/OBS MODERATE 35: CPT | Performed by: INTERNAL MEDICINE

## 2022-02-25 PROCEDURE — 250N000009 HC RX 250: Performed by: INTERNAL MEDICINE

## 2022-02-25 PROCEDURE — 36415 COLL VENOUS BLD VENIPUNCTURE: CPT | Performed by: INTERNAL MEDICINE

## 2022-02-25 PROCEDURE — 74177 CT ABD & PELVIS W/CONTRAST: CPT | Mod: ME

## 2022-02-25 PROCEDURE — 85027 COMPLETE CBC AUTOMATED: CPT | Performed by: INTERNAL MEDICINE

## 2022-02-25 PROCEDURE — 120N000001 HC R&B MED SURG/OB

## 2022-02-25 PROCEDURE — 80053 COMPREHEN METABOLIC PANEL: CPT | Performed by: INTERNAL MEDICINE

## 2022-02-25 PROCEDURE — 83690 ASSAY OF LIPASE: CPT | Performed by: INTERNAL MEDICINE

## 2022-02-25 PROCEDURE — 250N000011 HC RX IP 250 OP 636: Performed by: INTERNAL MEDICINE

## 2022-02-25 RX ORDER — QUETIAPINE FUMARATE 25 MG/1
12.5 TABLET, FILM COATED ORAL DAILY
DISCHARGE
Start: 2022-02-25

## 2022-02-25 RX ORDER — FUROSEMIDE 20 MG
20 TABLET ORAL DAILY
DISCHARGE
Start: 2022-02-25

## 2022-02-25 RX ORDER — IOPAMIDOL 755 MG/ML
75 INJECTION, SOLUTION INTRAVASCULAR ONCE
Status: COMPLETED | OUTPATIENT
Start: 2022-02-25 | End: 2022-02-25

## 2022-02-25 RX ORDER — FUROSEMIDE 20 MG
20 TABLET ORAL DAILY
Status: DISCONTINUED | OUTPATIENT
Start: 2022-02-25 | End: 2022-02-28 | Stop reason: HOSPADM

## 2022-02-25 RX ORDER — SUCRALFATE ORAL 1 G/10ML
1 SUSPENSION ORAL
DISCHARGE
Start: 2022-02-25

## 2022-02-25 RX ORDER — LANSOPRAZOLE 30 MG/1
30 TABLET, ORALLY DISINTEGRATING, DELAYED RELEASE ORAL
DISCHARGE
Start: 2022-02-25

## 2022-02-25 RX ORDER — GABAPENTIN 300 MG/1
300 CAPSULE ORAL 3 TIMES DAILY
DISCHARGE
Start: 2022-02-25

## 2022-02-25 RX ORDER — QUETIAPINE FUMARATE 25 MG/1
18.73 TABLET, FILM COATED ORAL AT BEDTIME
DISCHARGE
Start: 2022-02-25

## 2022-02-25 RX ADMIN — SUCRALFATE 1 G: 1 SUSPENSION ORAL at 12:47

## 2022-02-25 RX ADMIN — QUETIAPINE 12.5 MG: 25 TABLET ORAL at 08:16

## 2022-02-25 RX ADMIN — SUCRALFATE 1 G: 1 SUSPENSION ORAL at 21:14

## 2022-02-25 RX ADMIN — GABAPENTIN 300 MG: 300 CAPSULE ORAL at 21:14

## 2022-02-25 RX ADMIN — BRIMONIDINE TARTRATE 1 DROP: 2 SOLUTION/ DROPS OPHTHALMIC at 08:14

## 2022-02-25 RX ADMIN — LATANOPROST 1 DROP: 50 SOLUTION OPHTHALMIC at 21:13

## 2022-02-25 RX ADMIN — SERTRALINE HYDROCHLORIDE 100 MG: 20 SOLUTION ORAL at 08:15

## 2022-02-25 RX ADMIN — IOPAMIDOL 75 ML: 755 INJECTION, SOLUTION INTRAVENOUS at 15:01

## 2022-02-25 RX ADMIN — FUROSEMIDE 20 MG: 20 TABLET ORAL at 16:27

## 2022-02-25 RX ADMIN — SODIUM CHLORIDE 59 ML: 9 INJECTION, SOLUTION INTRAVENOUS at 15:01

## 2022-02-25 RX ADMIN — DORZOLAMIDE HYDROCHLORIDE AND TIMOLOL MALEATE 1 DROP: 20; 5 SOLUTION OPHTHALMIC at 20:26

## 2022-02-25 RX ADMIN — GABAPENTIN 300 MG: 300 CAPSULE ORAL at 14:20

## 2022-02-25 RX ADMIN — BRIMONIDINE TARTRATE 1 DROP: 2 SOLUTION/ DROPS OPHTHALMIC at 20:26

## 2022-02-25 RX ADMIN — LANSOPRAZOLE 30 MG: 15 TABLET, ORALLY DISINTEGRATING, DELAYED RELEASE ORAL at 06:24

## 2022-02-25 RX ADMIN — LEVOTHYROXINE SODIUM 150 MCG: 0.15 TABLET ORAL at 16:27

## 2022-02-25 RX ADMIN — LISINOPRIL 20 MG: 20 TABLET ORAL at 08:15

## 2022-02-25 RX ADMIN — SUCRALFATE 1 G: 1 SUSPENSION ORAL at 06:23

## 2022-02-25 RX ADMIN — DORZOLAMIDE HYDROCHLORIDE AND TIMOLOL MALEATE 1 DROP: 20; 5 SOLUTION OPHTHALMIC at 08:15

## 2022-02-25 RX ADMIN — GABAPENTIN 300 MG: 300 CAPSULE ORAL at 08:15

## 2022-02-25 RX ADMIN — LANSOPRAZOLE 30 MG: 15 TABLET, ORALLY DISINTEGRATING, DELAYED RELEASE ORAL at 16:27

## 2022-02-25 RX ADMIN — SUCRALFATE 1 G: 1 SUSPENSION ORAL at 16:27

## 2022-02-25 RX ADMIN — Medication 18.75 MG: at 21:12

## 2022-02-25 ASSESSMENT — ACTIVITIES OF DAILY LIVING (ADL)
ADLS_ACUITY_SCORE: 41
ADLS_ACUITY_SCORE: 39
ADLS_ACUITY_SCORE: 39
ADLS_ACUITY_SCORE: 41
ADLS_ACUITY_SCORE: 41
ADLS_ACUITY_SCORE: 39
ADLS_ACUITY_SCORE: 41

## 2022-02-25 NOTE — PROGRESS NOTES
"[facility]    Hospitalist Progress Note    Date of Service (when I saw the patient): 02/25/2022    Assessment & Plan   Jamia Coley is a 89 year old female admitted on 2/18/2022.  Patient with dementia who has resided in assisted living facility.  Unfortunately she had a infarct of the central/lateral clark in early January of this year.  This left her dependent for all ADLs and unable to ambulate initially.  Due to dysphagia, a feeding tube was placed and patient was discharged to TCU where she has been recovering since.  Her feeding tube was not functioning and so this was replaced 2/17 and then she presented to the hospital 2/18 with bleeding from the gastrostomy site.     PEG tube site bleeding  Acute blood loss anemia  PEG tube was initially placed on 1/12/2022, but was replaced on 2/17/2022 due to tube clogging.  Hemoglobin decreased from 12.5-10.9 in the emergency department.  On January 28, 2022 hemoglobin was 13.2.  EGD on 2/18/2022 with \"Bleeding ulcer adjacent to intact gastrostomy tube.  This was controlled with 7mL of 1:10,000 epinephrine, 2 MRI conditional hemostatic clips, electrocautery\":  --No further overt bleeding  --Hgb stable today at 9.9  --Continue Carafate.   --Change IV Protonix to lansoprazole solutab bid     Possible aspiration  Acute hypoxic respiratory failure 2/18/22  In the PACU 2/18, patient was requiring increased suctioning.  She was admitted to the intensive care unit overnight due to increased care needs.  Antibiotics were not initiated.  Chest x-ray without any evidence of infiltrate.  Oxygen was able to be weaned off by the afternoon of 2/19/2022.  However, she then had worsening oxygenation requirements that evening up to 4 LPM.  Suspicion is that this is secondary to volume replacement triggering a CHF exacerbation.  She developed a leukocytosis of 12.9 2/20 but this is after receiving steroids evening of 2/18 while in the operating room.  - " See below     Acute on chronic diastolc heart failure  History of cardiomyopathy by echo 1/2022, resolved  Hypertension then hypotension 2/20/2022   Acute hypoxic respiratory failure 2/20/22  Hypertension  Patient was hypotensive morning of 2/19 secondary to her ACE inhibitor in the setting of hypovolemia from her bleeding.  She was given 500 cc LR bolus and this ameliorated her hypotension.  However 2/20/2022 developed evidence of hypervolemia with bilateral rhonchi and worsening peripheral edema and acute hypoxia up to 4 L O2 as outlined above.       At the time of her stroke 1/2022, patient was diagnosed with systolic heart failure with a newly low EF of 25% with global hypokinesis of the LV noted 1/3/2022.  The etiology of her cardiomyopathy was not identified.  She has been maintained on furosemide 40 mg once daily, this was held on admission due to hypotension     BNP  4603.  Procalcitonin 0.15. CXR 2/20/22 - Progressive patchy opacities in both bases likely reflects progressive atelectasis. No effusions or signs of failure. Heart is slightly enlarged but unchanged. Bones are quite demineralized. Received Furosemide 40 mg IV x2 2/21/2022. Held her lisinopril given hypotension requiring IV fluid bolus 2/19. Repeat Echo 2/21/2022 with normalized EF. Gave additional lasix 40 IV x1 2/21/2022      Did not give additional lasix IV 2/22/2022 due to increasing bicarb/alkalosis. Restarted Lisinopril 5mg (usual 20) 2/22/2022.      Procalcitonin 0.09   CRP 85     O2 needs stable 2 LPM, BPs stable, afebrile  Etiology unclear as diuresis did not resolve hypoxemia and has developed alkalosis  Has rales on exam, but No JVD  Cannot rule out ongoing aspiration events   Weight stable, below baseline   - Check viral PCR, repeat Covid, influenza PCR  - No IV lasix today  - Increase lisinopril to 10 mg twice daily  - Continue to hold usual Lasix 40  - Echo demonstrates improved EF, now 50-55%, normal RV, PAP > 40 mmHg with RAP >  15 mmHg  - I/O, daily weights if able  - Resume lasix 20 mg daily     Dementia with behavioral issues  Depression  Anxiety  While at TCU, she had debilitating anxiety which she had prior to stroke but seems to have worsened since then.  Seem to be more calm 2/19/2022 and so her scheduled gabapentin 200 TID, Seroquel 12.5 BID, seroquel 6.25 po q6 prn were held to evaluate avoid oversedation but then significantly anxious 2/20/2022, was started on Haldol as needed.     Per daughter patient does not like to be in a room by herself alone during the day, gets fearful. Does better sitting out with the nurses Flaca     Agitated last night, this morning sleepy again  Received haldol x2 in past 24 hours for agitation, now somnolent  - Continue prior to admission sertraline 100 mg.    - Increased scheduled gabapentin to 300 TID 2/22/2022   - Change Seroquel form 12.5 BID to 12.5 in AM and 18.725 at bedtime   - Continue seroquel 6.25 q 6 pft prn     Elevated LFTs, mild  CPK, GGT normal  - Check troponin  - Trend     History of CVA  Right hemiparesis  The patient is dependent for all ADLs and transfers.  Per daughter, she recently ambulated 8 feet at the transitional care.  Prior to her stroke in January, she was ambulatory and physically functional although had significant dementia.     Dysphagia  Nutrition PEG tube  Per daughter patient has actually been eating up to 50 to 50% of her meals as of late but she remains on tube feeds at this time.  - Reinitiated tube feeding with her home routine Jevity 1.5 Kiet continuous feeding at 40 cc/hr (will start with 10 cc an hour and advance by 10 every 4 hours until goal), as well as a dysphagia 1 pureed diet with honey thick liquids and free water flushes 130cc every 6 hours.     - Speech consult on 2/24 unable to be completed due to lack of dentures, but recommending continued mod thick liquids and pureed diet despite continued cough present on 1 of 12 bites.  - Nutrition consult to  "convert Jevity 1.5 to Osmolite 1.5 at discharge     Atrial fibrillation  Patient is in atrial fibrillation by EKG on admission and echo 2022   The patient is not on rate control medication, but rate is controlled.   -Hold prior to admission apixaban due to bleeding.     Hypothyroidism  TSH 4.39  - Continue prior to admission levothyroxine.     Glaucoma  - Continue prior to admission Alphagan, Cosopt, and Xalatan.     Goals of care  MD discussed with daughter Víctor 2022.  She wants Jamia to be comfortable.  She says that Jamia has had multiple family members who had fatal strokes but they all  right away.  She says she would be happy if Jamia fell asleep and did not wake up again, but she does not want to treat Jamia \"like an animal and drop her off the vet.\"     After the stroke, the patient's daughter elected to proceed with feeding tube placement.  (There is a palliative care note from her hospitalization at [facility] on 1/3/2022 that seems to imply that they had advised against feeding tube placement.) The patient has had a slow rehab progress at TCU, but has been able to ambulate a few feet on her own recently.Víctor is really struggling watching her mom's slow progress since the stroke. She is DNR/DNI.       Palliative care consultation 2022. Patients daughter reports she would like for patient to be able to do rehab and see how far she may progress. Barriers to transfer to TCU include agitation     # Overweight: Estimated body mass index is 25.62 kg/m  as calculated from the following:    Height as of an earlier encounter on 22: 1.676 m (5' 6\").    Weight as of this encounter: 72 kg (158 lb 11.7 oz).     # Moderate Malnutrition: based on nutrition assessment  % Intake: adequate from TF regimen; does not meet criteria  % Weight Loss: 5% weight loss within one month  Subcutaneous Fat Loss: Unable to assess  Muscle Loss: Unable to assess  Fluid " Accumulation/Edema: Mild- Moderate  Malnutrition Diagnosis: Moderate malnutrition in the context of acute on chronic illness    Diet: Adult Formula Drip Feeding: Continuous Jevity 1.5; Gastrostomy; Goal Rate: 40; mL/hr; Medication - Feeding Tube Flush Frequency: At least 15-30 mL water before and after medication administration and with tube clogging; Initiate at 10 cc/hr and in...  Pureed Diet (level 4) Liquidized/Moderately Thick (level 3)  Snacks/Supplements Adult: Magic Cup; With Meals    DVT Prophylaxis: VTE Prophylaxis contraindicated due to agitation and GI bleed  Richardson Catheter: Not present  Central Lines: None  Cardiac Monitoring: None  Code Status: No CPR- Do NOT Intubate      Disposition: Expected discharge tomorrow with Hospice.  Talked with Víctor Cam (Daughter) [phone] and updated    Sly Griffin    Interval History   Patient sitting in her room.  She is tearful and complaining of pain on her bottom.    -Data reviewed today: I reviewed all new labs and imaging results over the last 24 hours. I personally reviewed no images or EKG's today.    Physical Exam   Temp: 98  F (36.7  C) Temp src: Oral BP: (!) 156/65 Pulse: 82   Resp: 18 SpO2: 96 % O2 Device: Nasal cannula Oxygen Delivery: 1 LPM  Vitals:    02/21/22 1400 02/22/22 1430 02/24/22 0600   Weight: 72 kg (158 lb 11.7 oz) 71 kg (156 lb 8.4 oz) 69.5 kg (153 lb 3.5 oz)     Vital Signs with Ranges  Temp:  [97.6  F (36.4  C)-99.2  F (37.3  C)] 98  F (36.7  C)  Pulse:  [65-87] 82  Resp:  [18-20] 18  BP: (122-156)/(56-65) 156/65  SpO2:  [89 %-99 %] 96 %  I/O last 3 completed shifts:  In: 948 [P.O.:50; NG/GT:898]  Out: -     Gen: Kyphotic debilitated elderly female, no acute distressed  HEENT: Atraumatic, normocephalic; sclera non-injected, anicterric; oral mucosa moist, no lesion, no exudate  Lungs: Clear to ausculation, no wheezes, no rhonchi, no rales  Heart: Regular rate, regular rhythm, no gallops, no rubs, 2/6 ARIANE  GI: Bowel sound  normal, no hepatosplenomegaly, no masses, RLQ tender, non-distended, no guarding, no rebound tenderness, PEG site well-healed  Lymph: No lymphadenopathy, no edema  Skin: No rashes, no chronic venous stasis     Medications     dextrose         brimonidine  1 drop Left Eye BID     dorzolamide-timolol  1 drop Left Eye BID     gabapentin  300 mg Per G Tube TID     LANsoprazole  30 mg Per G Tube BID AC     latanoprost  1 drop Left Eye At Bedtime     levothyroxine  150 mcg Per G Tube QPM     lisinopril  20 mg Per G Tube Daily     QUEtiapine  12.5 mg Per G Tube Daily     QUEtiapine  18.75 mg Per G Tube At Bedtime     [Held by provider] rosuvastatin  5 mg Per G Tube Daily     sertraline  100 mg Per G Tube Daily     sucralfate  1 g Per G Tube 4x Daily AC & HS       Data   Recent Labs   Lab 02/25/22  0749 02/24/22  0616 02/24/22  0107 02/23/22 2054 02/23/22  0438   WBC 10.7 10.5  --   --  11.2*   HGB 9.9* 10.7*  --   --  9.1*   * 106*  --   --  105*    240  --   --  191    140  --   --  134   POTASSIUM 4.2 3.3*  --   --  3.8   CHLORIDE 103 101  --   --  100   CO2 30 34*  --   --  34*   BUN 19 18  --   --  18   CR 0.53 0.55  --   --  0.51*   ANIONGAP 5 5  --   --  <1*   KYLE 8.1* 8.3*  --   --  7.7*   * 106* 125*   < > 127*   ALBUMIN 2.4* 2.6*  --   --  2.3*   PROTTOTAL 6.1* 6.5*  --   --  5.8*   BILITOTAL 0.5 0.5  --   --  0.8   ALKPHOS 73 71  --   --  56   * 270*  --   --  113*   * 295*  --   --  118*   LIPASE 127  --   --   --   --     < > = values in this interval not displayed.       No results found for this or any previous visit (from the past 24 hour(s)).

## 2022-02-25 NOTE — PLAN OF CARE
DATE & TIME: 2/24 7P-7A    Cognitive Concerns/ Orientation : Alert but confused, oriented to self. [place], pocket talker at bedside   BEHAVIOR & AGGRESSION TOOL COLOR: Green, cooperative.  anxious at times    ABNL VS/O2: VSS on 1 L NC  MOBILITY: Up to chair with Lift. Repositioned in bed. Stood at bedside with 2 assist, gait belt and walker but unable to ambulate.   PAIN MANAGEMENT: Denies  DIET: Pureed level 4 with Moderately thick liquids. TF order was restarted by Telemed MD.  Jevity 1.5 at 80/hr x 12 hours was started at 2130 with 130 ml flush Q6H  BOWEL/BLADDER: Incontinent of bowel and bladder  ABNL LAB/BG: K 3.3 and replaced by day shift. Patient refused K recheck at 2100. AST and ALT trending up  DRAIN/DEVICES: PIV SL, PEG  TELEMETRY RHYTHM: n/a  SKIN: bruises, pale, mepilex to bilateral posterior thighs CDI  TESTS/PROCEDURES: n/a   D/C DATE: 2/25 at 1100 via stretcher  OTHER IMPORTANT INFO: Right side hemiparesis. +3 edema of RUE (increased edema of right hand compared to yesterday), bilateral hips +2 edema. Productive cough

## 2022-02-25 NOTE — PROVIDER NOTIFICATION
MD Notification    Notified Person: Telemed MD    Notified Person Name: Claudia Buchanan    Notification Date/Time:  at     Notification Interaction: Paged    Purpose of Notification: No current TF order. Jevity order  and osmolite order is for discharge.    Orders Received: MD restarted order for Jevity 1.5 at 80/hr     Comments: Started at

## 2022-02-25 NOTE — PROGRESS NOTES
Care Management Follow Up    Length of Stay (days): 7    Expected Discharge Date:   Concerns to be Addressed: discharge planning     Patient plan of care discussed at interdisciplinary rounds: Yes    Anticipated Discharge Disposition: Long Term Care at [facility] (Phone: [phone] Fax: [phone]).    Anticipated Discharge Services: Transportation Services and Hospice cares  Anticipated Discharge DME: None    Patient/family educated on Medicare website which has current facility and service quality ratings: yes  Education Provided on the Discharge Plan:  yes  Patient/Family in Agreement with the Plan: yes    Referrals Placed by CM/SW: External Care Coordination, Hospice, Post Acute Facilities, Transportation  Private pay costs discussed: transportation costs and private pay costs at LTC    Additional Information:  Per IDT rounds today, MD team states that pt is not medically stable for discharge today, but hopes to be ready in the next 1-2 days.  MANDY called [facility] (Phone: [phone] Fax: [phone]), Cassandra, and informed of cancelled discharge plan.  Unable to accept pt over the weekend, but will accept on 2-28-22 for cares.  MANDY called [facility] (phone [phone]/fax: [phone]), spoke with Claudia, and informed of cancelled discharge today, but will accept for cares on 2-28-22.  MANDY called and spoke with [employer] Lisa espinoza, and changed ride to 11:00 AM on 2-28-22.  MANDY called and updated pt's daughter, Víctor of plan of care.  She is in agreement with this plan. verbalized understanding & expressed great appreciation to our MD for his medical care.    PLAN OF CARE:  Pt will discharge to [facility] via [facility] stretcher with oxygen on 2/28/22 at 11:00 AM.        RICARDO Calderon

## 2022-02-25 NOTE — PROGRESS NOTES
Given that Dariela's goals of care are clear, the palliative care team will sign off at this time.  We are always available if new needs arise for Dariela or her daughter, Wendi.    Bay Fabian MD MS FAAFP  [facility]  Office [phone]  Fax [phone]

## 2022-02-25 NOTE — PLAN OF CARE
Patient was supposed to discharge to hospice care today, but held due to elevated LFTs. Awaiting CT scan to be done. She is confused and yells out on occasion. Has taken nothing other than a sip of fluids from her trays today and then yelled that she doesn't want any of it. Had been sitting up in chair much of the am. Now in bed. Tube feeding shut off this am as ordered. Given 2 free water flushes today thus far. Vitals are stable. Will monitor.

## 2022-02-26 LAB
ALBUMIN SERPL-MCNC: 2.1 G/DL (ref 3.4–5)
ALP SERPL-CCNC: 73 U/L (ref 40–150)
ALT SERPL W P-5'-P-CCNC: 337 U/L (ref 0–50)
ANION GAP SERPL CALCULATED.3IONS-SCNC: 4 MMOL/L (ref 3–14)
AST SERPL W P-5'-P-CCNC: 295 U/L (ref 0–45)
BILIRUB SERPL-MCNC: 0.6 MG/DL (ref 0.2–1.3)
BUN SERPL-MCNC: 20 MG/DL (ref 7–30)
CALCIUM SERPL-MCNC: 8.1 MG/DL (ref 8.5–10.1)
CHLORIDE BLD-SCNC: 101 MMOL/L (ref 94–109)
CO2 SERPL-SCNC: 32 MMOL/L (ref 20–32)
CREAT SERPL-MCNC: 0.58 MG/DL (ref 0.52–1.04)
ERYTHROCYTE [DISTWIDTH] IN BLOOD BY AUTOMATED COUNT: 15.1 % (ref 10–15)
GFR SERPL CREATININE-BSD FRML MDRD: 86 ML/MIN/1.73M2
GLUCOSE BLD-MCNC: 149 MG/DL (ref 70–99)
HCT VFR BLD AUTO: 30.2 % (ref 35–47)
HGB BLD-MCNC: 9.5 G/DL (ref 11.7–15.7)
MCH RBC QN AUTO: 33 PG (ref 26.5–33)
MCHC RBC AUTO-ENTMCNC: 31.5 G/DL (ref 31.5–36.5)
MCV RBC AUTO: 105 FL (ref 78–100)
PHOSPHATE SERPL-MCNC: 3.4 MG/DL (ref 2.5–4.5)
PLATELET # BLD AUTO: 322 10E3/UL (ref 150–450)
POTASSIUM BLD-SCNC: 3.9 MMOL/L (ref 3.4–5.3)
PROT SERPL-MCNC: 5.8 G/DL (ref 6.8–8.8)
RBC # BLD AUTO: 2.88 10E6/UL (ref 3.8–5.2)
SODIUM SERPL-SCNC: 137 MMOL/L (ref 133–144)
WBC # BLD AUTO: 9.1 10E3/UL (ref 4–11)

## 2022-02-26 PROCEDURE — 84100 ASSAY OF PHOSPHORUS: CPT | Performed by: INTERNAL MEDICINE

## 2022-02-26 PROCEDURE — 250N000013 HC RX MED GY IP 250 OP 250 PS 637: Performed by: INTERNAL MEDICINE

## 2022-02-26 PROCEDURE — 120N000001 HC R&B MED SURG/OB

## 2022-02-26 PROCEDURE — 80053 COMPREHEN METABOLIC PANEL: CPT | Performed by: INTERNAL MEDICINE

## 2022-02-26 PROCEDURE — 36415 COLL VENOUS BLD VENIPUNCTURE: CPT | Performed by: INTERNAL MEDICINE

## 2022-02-26 PROCEDURE — 99232 SBSQ HOSP IP/OBS MODERATE 35: CPT | Performed by: INTERNAL MEDICINE

## 2022-02-26 PROCEDURE — 85027 COMPLETE CBC AUTOMATED: CPT | Performed by: INTERNAL MEDICINE

## 2022-02-26 RX ADMIN — LATANOPROST 1 DROP: 50 SOLUTION OPHTHALMIC at 23:15

## 2022-02-26 RX ADMIN — LANSOPRAZOLE 30 MG: 15 TABLET, ORALLY DISINTEGRATING, DELAYED RELEASE ORAL at 16:46

## 2022-02-26 RX ADMIN — BRIMONIDINE TARTRATE 1 DROP: 2 SOLUTION/ DROPS OPHTHALMIC at 09:37

## 2022-02-26 RX ADMIN — GABAPENTIN 300 MG: 300 CAPSULE ORAL at 09:35

## 2022-02-26 RX ADMIN — SUCRALFATE 1 G: 1 SUSPENSION ORAL at 09:35

## 2022-02-26 RX ADMIN — LEVOTHYROXINE SODIUM 150 MCG: 0.15 TABLET ORAL at 16:46

## 2022-02-26 RX ADMIN — GABAPENTIN 300 MG: 300 CAPSULE ORAL at 13:51

## 2022-02-26 RX ADMIN — GABAPENTIN 300 MG: 300 CAPSULE ORAL at 19:43

## 2022-02-26 RX ADMIN — FUROSEMIDE 20 MG: 20 TABLET ORAL at 09:36

## 2022-02-26 RX ADMIN — SUCRALFATE 1 G: 1 SUSPENSION ORAL at 13:51

## 2022-02-26 RX ADMIN — SUCRALFATE 1 G: 1 SUSPENSION ORAL at 17:14

## 2022-02-26 RX ADMIN — BRIMONIDINE TARTRATE 1 DROP: 2 SOLUTION/ DROPS OPHTHALMIC at 19:49

## 2022-02-26 RX ADMIN — DORZOLAMIDE HYDROCHLORIDE AND TIMOLOL MALEATE 1 DROP: 20; 5 SOLUTION OPHTHALMIC at 09:37

## 2022-02-26 RX ADMIN — LISINOPRIL 20 MG: 20 TABLET ORAL at 09:36

## 2022-02-26 RX ADMIN — SERTRALINE HYDROCHLORIDE 100 MG: 20 SOLUTION ORAL at 09:35

## 2022-02-26 RX ADMIN — LANSOPRAZOLE 30 MG: 15 TABLET, ORALLY DISINTEGRATING, DELAYED RELEASE ORAL at 09:37

## 2022-02-26 RX ADMIN — QUETIAPINE 12.5 MG: 25 TABLET ORAL at 09:36

## 2022-02-26 RX ADMIN — DORZOLAMIDE HYDROCHLORIDE AND TIMOLOL MALEATE 1 DROP: 20; 5 SOLUTION OPHTHALMIC at 19:48

## 2022-02-26 RX ADMIN — SUCRALFATE 1 G: 1 SUSPENSION ORAL at 22:50

## 2022-02-26 RX ADMIN — Medication 18.75 MG: at 23:11

## 2022-02-26 ASSESSMENT — ACTIVITIES OF DAILY LIVING (ADL)
ADLS_ACUITY_SCORE: 41
ADLS_ACUITY_SCORE: 41
ADLS_ACUITY_SCORE: 39
ADLS_ACUITY_SCORE: 43
ADLS_ACUITY_SCORE: 41
ADLS_ACUITY_SCORE: 41
ADLS_ACUITY_SCORE: 43
ADLS_ACUITY_SCORE: 43
ADLS_ACUITY_SCORE: 39
ADLS_ACUITY_SCORE: 39
ADLS_ACUITY_SCORE: 43
ADLS_ACUITY_SCORE: 41
ADLS_ACUITY_SCORE: 39
ADLS_ACUITY_SCORE: 43
ADLS_ACUITY_SCORE: 41
ADLS_ACUITY_SCORE: 43
ADLS_ACUITY_SCORE: 43
ADLS_ACUITY_SCORE: 39
ADLS_ACUITY_SCORE: 43
ADLS_ACUITY_SCORE: 41
ADLS_ACUITY_SCORE: 39
ADLS_ACUITY_SCORE: 41
ADLS_ACUITY_SCORE: 39
ADLS_ACUITY_SCORE: 39

## 2022-02-26 NOTE — PLAN OF CARE
Goal Outcome Evaluation:    Plan of Care Reviewed With: patient      Alert, confused, calling out occasionally this morning, although rested well throughout shift. Jevity 1.5 running at 80ml/hr, initiated at 2015 for 12 hours. Patient does frequently remove O2, oxymask and NC attempted. VSS, pureed with level 3 liquids as will tolerate.

## 2022-02-26 NOTE — PROGRESS NOTES
"[facility]    Hospitalist Progress Note    Date of Service (when I saw the patient): 02/26/2022    Assessment & Plan   Jamia Coley is a 89 year old female admitted on 2/18/2022.  Patient with dementia who has resided in assisted living facility.  Unfortunately she had a infarct of the central/lateral clark in early January of this year.  This left her dependent for all ADLs and unable to ambulate initially.  Due to dysphagia, a feeding tube was placed and patient was discharged to TCU where she has been recovering since.  Her feeding tube was not functioning and so this was replaced 2/17 and then she presented to the hospital 2/18 with bleeding from the gastrostomy site.     PEG tube site bleeding  Acute blood loss anemia  PEG tube was initially placed on 1/12/2022, but was replaced on 2/17/2022 due to tube clogging.  Hemoglobin decreased from 12.5-10.9 in the emergency department.  On January 28, 2022 hemoglobin was 13.2.  EGD on 2/18/2022 with \"Bleeding ulcer adjacent to intact gastrostomy tube.  This was controlled with 7mL of 1:10,000 epinephrine, 2 MRI conditional hemostatic clips, electrocautery\":  --No further overt bleeding  --Hgb stable today at 9-10  --Continue Carafate.   --Change IV Protonix to lansoprazole solutab bid     Possible aspiration  Acute hypoxic respiratory failure 2/18/22  In the PACU 2/18, patient was requiring increased suctioning.  She was admitted to the intensive care unit overnight due to increased care needs.  Antibiotics were not initiated.  Chest x-ray without any evidence of infiltrate.  Oxygen was able to be weaned off by the afternoon of 2/19/2022.  However, she then had worsening oxygenation requirements that evening up to 4 LPM.  Suspicion is that this is secondary to volume replacement triggering a CHF exacerbation.  She developed a leukocytosis of 12.9 2/20 but this is after receiving steroids evening of 2/18 while in the operating room.  - " See below     Acute on chronic diastolc heart failure  History of cardiomyopathy by echo 1/2022, resolved  Hypertension then hypotension 2/20/2022   Acute hypoxic respiratory failure 2/20/22  Hypertension  Patient was hypotensive morning of 2/19 secondary to her ACE inhibitor in the setting of hypovolemia from her bleeding.  She was given 500 cc LR bolus and this ameliorated her hypotension.  However 2/20/2022 developed evidence of hypervolemia with bilateral rhonchi and worsening peripheral edema and acute hypoxia up to 4 L O2 as outlined above.       At the time of her stroke 1/2022, patient was diagnosed with systolic heart failure with a newly low EF of 25% with global hypokinesis of the LV noted 1/3/2022.  The etiology of her cardiomyopathy was not identified.  She has been maintained on furosemide 40 mg once daily, this was held on admission due to hypotension     BNP  4603.  Procalcitonin 0.15. CXR 2/20/22 - Progressive patchy opacities in both bases likely reflects progressive atelectasis. No effusions or signs of failure. Heart is slightly enlarged but unchanged. Bones are quite demineralized. Received Furosemide 40 mg IV x2 2/21/2022. Held her lisinopril given hypotension requiring IV fluid bolus 2/19. Repeat Echo 2/21/2022 with normalized EF. Gave additional lasix 40 IV x1 2/21/2022      Did not give additional lasix IV 2/22/2022 due to increasing bicarb/alkalosis. Restarted Lisinopril 5mg (usual 20) 2/22/2022.      Procalcitonin 0.09   CRP 85     O2 needs stable 2 LPM, BPs stable, afebrile  Etiology unclear as diuresis did not resolve hypoxemia and has developed alkalosis  Has rales on exam, but No JVD  Cannot rule out ongoing aspiration events   Weight stable, below baseline   - Check viral PCR, repeat Covid, influenza PCR  - No IV lasix today  - Increase lisinopril to 10 mg twice daily  - Continue to hold usual Lasix 40  - Echo demonstrates improved EF, now 50-55%, normal RV, PAP > 40 mmHg with RAP >  15 mmHg  - I/O, daily weights if able  - Resume lasix 20 mg daily     Dementia with behavioral issues  Depression  Anxiety  While at TCU, she had debilitating anxiety which she had prior to stroke but seems to have worsened since then.  Seem to be more calm 2/19/2022 and so her scheduled gabapentin 200 TID, Seroquel 12.5 BID, seroquel 6.25 po q6 prn were held to evaluate avoid oversedation but then significantly anxious 2/20/2022, was started on Haldol as needed.     Per daughter patient does not like to be in a room by herself alone during the day, gets fearful. Does better sitting out with the nurses Flaca     Agitated last night, this morning sleepy again  Received haldol x2 in past 24 hours for agitation, now somnolent  - Continue prior to admission sertraline 100 mg.    - Increased scheduled gabapentin to 300 TID 2/22/2022   - Change Seroquel form 12.5 BID to 12.5 in AM and 18.725 at bedtime   - Continue seroquel 6.25 q 6 pft prn     Elevated LFTs, mild  CPK, GGT normal  - Discontinued rosuvastatin  - CT A/P demonstrates normal liver w/o biliary dilation or cholecystitis  - AST/ALT now trending down     History of CVA  Right hemiparesis  The patient is dependent for all ADLs and transfers.  Per daughter, she recently ambulated 8 feet at the transitional care.  Prior to her stroke in January, she was ambulatory and physically functional although had significant dementia.     Dysphagia  Nutrition PEG tube  Per daughter patient has actually been eating up to 50 to 50% of her meals as of late but she remains on tube feeds at this time.  - Reinitiated tube feeding with her home routine Jevity 1.5 Kiet continuous feeding at 40 cc/hr (will start with 10 cc an hour and advance by 10 every 4 hours until goal), as well as a dysphagia 1 pureed diet with honey thick liquids and free water flushes 130cc every 6 hours.     - Speech consult on 2/24 unable to be completed due to lack of dentures, but recommending continued mod  "thick liquids and pureed diet despite continued cough present on 1 of 12 bites.  - Nutrition consult to convert Jevity 1.5 to Osmolite 1.5 at discharge     Atrial fibrillation  Patient is in atrial fibrillation by EKG on admission and echo 2022   The patient is not on rate control medication, but rate is controlled.   -Hold prior to admission apixaban due to bleeding.     Hypothyroidism  TSH 4.39  - Continue prior to admission levothyroxine.     Glaucoma  - Continue prior to admission Alphagan, Cosopt, and Xalatan.     Goals of care  MD discussed with daughter Víctor 2022.  She wants Jamia to be comfortable.  She says that Jamia has had multiple family members who had fatal strokes but they all  right away.  She says she would be happy if Jamia fell asleep and did not wake up again, but she does not want to treat Jamia \"like an animal and drop her off the vet.\"     After the stroke, the patient's daughter elected to proceed with feeding tube placement.  (There is a palliative care note from her hospitalization at [facility] on 1/3/2022 that seems to imply that they had advised against feeding tube placement.) The patient has had a slow rehab progress at TCU, but has been able to ambulate a few feet on her own recently.Víctor is really struggling watching her mom's slow progress since the stroke. She is DNR/DNI.       Palliative care consultation 2022. Patients daughter reports she would like for patient to be able to do rehab and see how far she may progress.      Overweight  Estimated body mass index is 25.62 kg/m  as calculated from the following:    Height as of an earlier encounter on 22: 1.676 m (5' 6\").    Weight as of this encounter: 72 kg (158 lb 11.7 oz).     Moderate Malnutrition  Based on nutrition assessment  % Intake: adequate from TF regimen; does not meet criteria  % Weight Loss: 5% weight loss within one month  Subcutaneous Fat Loss: Unable to " assess  Muscle Loss: Unable to assess  Fluid Accumulation/Edema: Mild- Moderate  Malnutrition Diagnosis: Moderate malnutrition in the context of acute on chronic illness    Diet: Adult Formula Drip Feeding: Continuous Jevity 1.5; Gastrostomy; Goal Rate: 40; mL/hr; Medication - Feeding Tube Flush Frequency: At least 15-30 mL water before and after medication administration and with tube clogging; Initiate at 10 cc/hr and in...  Pureed Diet (level 4) Liquidized/Moderately Thick (level 3)  Snacks/Supplements Adult: Magic Cup; With Meals    DVT Prophylaxis: VTE Prophylaxis contraindicated due to agitation and GI bleed  Richardson Catheter: Not present  Central Lines: None  Cardiac Monitoring: None  Code Status: No CPR- Do NOT Intubate      Disposition: Expected discharge Monday with Hospice.  Talked with Víctor Cam (Daughter) [phone] and updated    Sly Griffin    Interval History   Patient sitting in her room.  She has no acute complaints.    -Data reviewed today: I reviewed all new labs and imaging results over the last 24 hours. I personally reviewed no images or EKG's today.    Physical Exam   Temp: 99  F (37.2  C) Temp src: Axillary BP: 125/54 Pulse: 74   Resp: 18 SpO2: 90 % O2 Device: Nasal cannula Oxygen Delivery: 1 LPM  Vitals:    02/21/22 1400 02/22/22 1430 02/24/22 0600   Weight: 72 kg (158 lb 11.7 oz) 71 kg (156 lb 8.4 oz) 69.5 kg (153 lb 3.5 oz)     Vital Signs with Ranges  Temp:  [97.3  F (36.3  C)-99  F (37.2  C)] 99  F (37.2  C)  Pulse:  [74-93] 74  Resp:  [16-20] 18  BP: (119-159)/(41-65) 125/54  SpO2:  [90 %-96 %] 90 %  I/O last 3 completed shifts:  In: 1410 [NG/GT:450]  Out: -     Gen: Kyphotic debilitated elderly female, no acute distressed  HEENT: Atraumatic, normocephalic; sclera non-injected, anicterric; oral mucosa moist, no lesion, no exudate  Lungs: Clear to ausculation, no wheezes, no rhonchi, no rales  Heart: Regular rate, regular rhythm, no gallops, no rubs, 2/6 ARIANE  GI: Bowel  sound normal, no hepatosplenomegaly, no masses, RLQ tender, non-distended, no guarding, no rebound tenderness, PEG site well-healed  Lymph: No lymphadenopathy, no edema  Skin: No rashes, no chronic venous stasis     Medications     dextrose         brimonidine  1 drop Left Eye BID     dorzolamide-timolol  1 drop Left Eye BID     furosemide  20 mg Oral Daily     gabapentin  300 mg Per G Tube TID     LANsoprazole  30 mg Per G Tube BID AC     latanoprost  1 drop Left Eye At Bedtime     levothyroxine  150 mcg Per G Tube QPM     lisinopril  20 mg Per G Tube Daily     QUEtiapine  12.5 mg Per G Tube Daily     QUEtiapine  18.75 mg Per G Tube At Bedtime     [Held by provider] rosuvastatin  5 mg Per G Tube Daily     sertraline  100 mg Per G Tube Daily     sucralfate  1 g Per G Tube 4x Daily AC & HS       Data   Recent Labs   Lab 02/26/22  0500 02/25/22  0749 02/24/22  0616   WBC 9.1 10.7 10.5   HGB 9.5* 9.9* 10.7*   * 106* 106*    255 240    138 140   POTASSIUM 3.9 4.2 3.3*   CHLORIDE 101 103 101   CO2 32 30 34*   BUN 20 19 18   CR 0.58 0.53 0.55   ANIONGAP 4 5 5   KYLE 8.1* 8.1* 8.3*   * 159* 106*   ALBUMIN 2.1* 2.4* 2.6*   PROTTOTAL 5.8* 6.1* 6.5*   BILITOTAL 0.6 0.5 0.5   ALKPHOS 73 73 71   * 366* 270*   * 346* 295*   LIPASE  --  127  --        Recent Results (from the past 24 hour(s))   CT Abd/Pelvis w Contrast*    Narrative    CT ABDOMEN AND PELVIS WITH CONTRAST 2/25/2022 3:27 PM    CLINICAL HISTORY: Abdominal pain, acute, nonlocalized    TECHNIQUE: CT scan of the abdomen and pelvis was performed following  injection of IV contrast. Multiplanar reformats were obtained. Dose  reduction techniques were used.  CONTRAST: 75 mL Isovue 370    COMPARISON: 10/2/2012    FINDINGS:   LOWER CHEST: Partially imaged irregular left lower lobe opacities are  present. The right lung appears clear with exception of mild suspected  right basilar atelectasis and/or scarring. No pleural effusion.  Heart  size is mildly enlarged.    HEPATOBILIARY: Normal.    PANCREAS: Normal.    SPLEEN: Normal.    ADRENAL GLANDS: Normal.    KIDNEYS/BLADDER: Multifocal renal cortical thinning and scarring is  present, left greater than right. No solid enhancing renal mass.  Suspected small left renal cortical cyst is noted (3-35). No follow-up  is necessary. No nephrolithiasis or hydronephrosis. Urinary bladder is  normal-appearing.    BOWEL: A PEG tube is present with the bulb in the gastric body.  Stomach is decompressed. No signs of bowel obstruction or  inflammation. Colonic diverticulosis is present without evidence of  diverticulitis. A moderate amount of formed stool is seen in the  rectum with a rectal fecal ball noted.    PELVIC ORGANS: Normal.    ADDITIONAL FINDINGS: There is fusiform aneurysmal dilation of the  upper infrarenal abdominal aorta measuring 5.6 x 5.7 cm in AP and  transverse dimensions, respectively (3-54). There is separate fusiform  aneurysmal dilation involving the distal infrarenal abdominal aorta  measuring 5.0 x 4.6 cm (3-83). At least moderate atherosclerotic  plaque is seen throughout the abdominal aorta and iliac arteries. No  evidence of aortic dissection.    MUSCULOSKELETAL: Generalized osteopenia is present. Multilevel  hypertrophic and degenerative changes of the spine are present. No  acute osseous abnormality. There is mild body wall edema present, most  pronounced involving the proximal lower extremities and gluteal  regions.    There is an oval-shaped indeterminate mass in the inferior left breast  measuring 2.1 x 1.3 cm (3-19).      Impression    IMPRESSION:   1.  Left lower lobe opacity, suspicious for pneumonia.  2.  Fusiform infrarenal abdominal aortic aneurysms. No evidence of  aortic dissection.  3.  Colonic diverticulosis without evidence of diverticulitis.  4.  Indeterminate circumscribed mass in the inferior left breast,  which can be further evaluated with mammogram and/or  ultrasound on a  nonemergent basis.    STEVEN CRAWFORD MD         SYSTEM ID:  [id number]

## 2022-02-26 NOTE — PLAN OF CARE
Goal Outcome Evaluation:    Plan of Care Reviewed With: Daughter         Outcome Evaluation: Ready to transfer to TCU.    Patient's appetite is not good. Daughter will  her dentures which may help, although is on pureed diet. Denies discomfort. Up in chair over lunch meal. Cooperative with cares. Dr. Griffin came and updated her daughter on her status and plan of care. Will monitor.

## 2022-02-27 LAB
ALBUMIN SERPL-MCNC: 2.3 G/DL (ref 3.4–5)
ALP SERPL-CCNC: 72 U/L (ref 40–150)
ALT SERPL W P-5'-P-CCNC: 306 U/L (ref 0–50)
ANION GAP SERPL CALCULATED.3IONS-SCNC: 1 MMOL/L (ref 3–14)
AST SERPL W P-5'-P-CCNC: 192 U/L (ref 0–45)
BILIRUB SERPL-MCNC: 0.3 MG/DL (ref 0.2–1.3)
BUN SERPL-MCNC: 22 MG/DL (ref 7–30)
CALCIUM SERPL-MCNC: 8.2 MG/DL (ref 8.5–10.1)
CHLORIDE BLD-SCNC: 104 MMOL/L (ref 94–109)
CO2 SERPL-SCNC: 35 MMOL/L (ref 20–32)
CREAT SERPL-MCNC: 0.65 MG/DL (ref 0.52–1.04)
CRP SERPL-MCNC: 73 MG/L (ref 0–8)
ERYTHROCYTE [DISTWIDTH] IN BLOOD BY AUTOMATED COUNT: 15.1 % (ref 10–15)
GFR SERPL CREATININE-BSD FRML MDRD: 84 ML/MIN/1.73M2
GLUCOSE BLD-MCNC: 141 MG/DL (ref 70–99)
GLUCOSE BLDC GLUCOMTR-MCNC: 93 MG/DL (ref 70–99)
HCT VFR BLD AUTO: 31.4 % (ref 35–47)
HGB BLD-MCNC: 9.8 G/DL (ref 11.7–15.7)
MCH RBC QN AUTO: 32.6 PG (ref 26.5–33)
MCHC RBC AUTO-ENTMCNC: 31.2 G/DL (ref 31.5–36.5)
MCV RBC AUTO: 104 FL (ref 78–100)
PHOSPHATE SERPL-MCNC: 3.7 MG/DL (ref 2.5–4.5)
PLATELET # BLD AUTO: 335 10E3/UL (ref 150–450)
POTASSIUM BLD-SCNC: 3.8 MMOL/L (ref 3.4–5.3)
PROCALCITONIN SERPL-MCNC: 0.07 NG/ML
PROT SERPL-MCNC: 5.8 G/DL (ref 6.8–8.8)
RBC # BLD AUTO: 3.01 10E6/UL (ref 3.8–5.2)
SODIUM SERPL-SCNC: 140 MMOL/L (ref 133–144)
WBC # BLD AUTO: 9 10E3/UL (ref 4–11)

## 2022-02-27 PROCEDURE — 85014 HEMATOCRIT: CPT | Performed by: INTERNAL MEDICINE

## 2022-02-27 PROCEDURE — 250N000011 HC RX IP 250 OP 636: Performed by: FAMILY MEDICINE

## 2022-02-27 PROCEDURE — 36415 COLL VENOUS BLD VENIPUNCTURE: CPT | Performed by: INTERNAL MEDICINE

## 2022-02-27 PROCEDURE — 99207 PR CDG-MDM COMPONENT: MEETS MODERATE - DOWN CODED: CPT | Performed by: FAMILY MEDICINE

## 2022-02-27 PROCEDURE — 250N000013 HC RX MED GY IP 250 OP 250 PS 637: Performed by: INTERNAL MEDICINE

## 2022-02-27 PROCEDURE — 86140 C-REACTIVE PROTEIN: CPT | Performed by: FAMILY MEDICINE

## 2022-02-27 PROCEDURE — 80053 COMPREHEN METABOLIC PANEL: CPT | Performed by: INTERNAL MEDICINE

## 2022-02-27 PROCEDURE — 120N000001 HC R&B MED SURG/OB

## 2022-02-27 PROCEDURE — 250N000013 HC RX MED GY IP 250 OP 250 PS 637: Performed by: FAMILY MEDICINE

## 2022-02-27 PROCEDURE — 99232 SBSQ HOSP IP/OBS MODERATE 35: CPT | Performed by: FAMILY MEDICINE

## 2022-02-27 PROCEDURE — 84100 ASSAY OF PHOSPHORUS: CPT | Performed by: INTERNAL MEDICINE

## 2022-02-27 PROCEDURE — 84145 PROCALCITONIN (PCT): CPT | Performed by: FAMILY MEDICINE

## 2022-02-27 RX ORDER — LACTOBACILLUS RHAMNOSUS GG 10B CELL
1 CAPSULE ORAL 2 TIMES DAILY
Status: DISCONTINUED | OUTPATIENT
Start: 2022-02-27 | End: 2022-02-28 | Stop reason: HOSPADM

## 2022-02-27 RX ORDER — CLINDAMYCIN PHOSPHATE 900 MG/50ML
900 INJECTION, SOLUTION INTRAVENOUS EVERY 8 HOURS
Status: DISCONTINUED | OUTPATIENT
Start: 2022-02-27 | End: 2022-02-28 | Stop reason: HOSPADM

## 2022-02-27 RX ORDER — L. ACIDOPHILUS/PECTIN, CITRUS 25MM-100MG
1 TABLET ORAL
Status: DISCONTINUED | OUTPATIENT
Start: 2022-02-27 | End: 2022-02-27 | Stop reason: CLARIF

## 2022-02-27 RX ORDER — ACETAMINOPHEN 325 MG/1
650 TABLET ORAL EVERY 4 HOURS PRN
Status: DISCONTINUED | OUTPATIENT
Start: 2022-02-27 | End: 2022-02-28 | Stop reason: HOSPADM

## 2022-02-27 RX ADMIN — QUETIAPINE 12.5 MG: 25 TABLET ORAL at 07:50

## 2022-02-27 RX ADMIN — CLINDAMYCIN PHOSPHATE 900 MG: 900 INJECTION, SOLUTION INTRAVENOUS at 19:58

## 2022-02-27 RX ADMIN — SUCRALFATE 1 G: 1 SUSPENSION ORAL at 16:20

## 2022-02-27 RX ADMIN — SUCRALFATE 1 G: 1 SUSPENSION ORAL at 05:57

## 2022-02-27 RX ADMIN — GABAPENTIN 300 MG: 300 CAPSULE ORAL at 14:01

## 2022-02-27 RX ADMIN — LANSOPRAZOLE 30 MG: 15 TABLET, ORALLY DISINTEGRATING, DELAYED RELEASE ORAL at 16:20

## 2022-02-27 RX ADMIN — APIXABAN 2.5 MG: 2.5 TABLET, FILM COATED ORAL at 22:26

## 2022-02-27 RX ADMIN — SUCRALFATE 1 G: 1 SUSPENSION ORAL at 22:34

## 2022-02-27 RX ADMIN — LEVOTHYROXINE SODIUM 150 MCG: 0.15 TABLET ORAL at 16:25

## 2022-02-27 RX ADMIN — LANSOPRAZOLE 30 MG: 15 TABLET, ORALLY DISINTEGRATING, DELAYED RELEASE ORAL at 05:57

## 2022-02-27 RX ADMIN — Medication 18.75 MG: at 22:26

## 2022-02-27 RX ADMIN — Medication 1 CAPSULE: at 22:25

## 2022-02-27 RX ADMIN — FUROSEMIDE 20 MG: 20 TABLET ORAL at 07:51

## 2022-02-27 RX ADMIN — GABAPENTIN 300 MG: 300 CAPSULE ORAL at 07:51

## 2022-02-27 RX ADMIN — GABAPENTIN 300 MG: 300 CAPSULE ORAL at 22:26

## 2022-02-27 RX ADMIN — DORZOLAMIDE HYDROCHLORIDE AND TIMOLOL MALEATE 1 DROP: 20; 5 SOLUTION OPHTHALMIC at 07:51

## 2022-02-27 RX ADMIN — BRIMONIDINE TARTRATE 1 DROP: 2 SOLUTION/ DROPS OPHTHALMIC at 07:51

## 2022-02-27 RX ADMIN — LATANOPROST 1 DROP: 50 SOLUTION OPHTHALMIC at 22:32

## 2022-02-27 RX ADMIN — LISINOPRIL 20 MG: 20 TABLET ORAL at 07:50

## 2022-02-27 RX ADMIN — BRIMONIDINE TARTRATE 1 DROP: 2 SOLUTION/ DROPS OPHTHALMIC at 22:32

## 2022-02-27 RX ADMIN — DORZOLAMIDE HYDROCHLORIDE AND TIMOLOL MALEATE 1 DROP: 20; 5 SOLUTION OPHTHALMIC at 22:32

## 2022-02-27 RX ADMIN — SUCRALFATE 1 G: 1 SUSPENSION ORAL at 12:08

## 2022-02-27 RX ADMIN — SERTRALINE HYDROCHLORIDE 100 MG: 20 SOLUTION ORAL at 07:51

## 2022-02-27 ASSESSMENT — ACTIVITIES OF DAILY LIVING (ADL)
ADLS_ACUITY_SCORE: 43

## 2022-02-27 NOTE — PROGRESS NOTES
"[facility] Hospitalist Progress Note           Assessment & Plan        Jamia Coley is a 89 year old female admitted on 2/18/2022.  Patient with dementia who has resided in assisted living facility.  Unfortunately she had a infarct of the central/lateral clark in early January of this year.  This left her dependent for all ADLs and unable to ambulate initially.  Due to dysphagia, a feeding tube was placed and patient was discharged to TCU where she has been recovering since.  Her feeding tube was not functioning and so this was replaced 2/17 and then she presented to the hospital 2/18 with bleeding from the gastrostomy site.     PEG tube site bleeding  Acute blood loss anemia  PEG tube was initially placed on 1/12/2022, but was replaced on 2/17/2022 due to tube clogging.  Hemoglobin decreased from 12.5-10.9 in the emergency department.  On January 28, 2022 hemoglobin was 13.2.  EGD on 2/18/2022 with \"Bleeding ulcer adjacent to intact gastrostomy tube.  This was controlled with 7mL of 1:10,000 epinephrine, 2 MRI conditional hemostatic clips, electrocautery\":  --No further overt bleeding  --Hgb stable so far at 9-10  --Continue Carafate.   --Changed IV Protonix to lansoprazole solutab twice daily 2/24  - held apixaban initially, resumed 2/27 with stable hemoglobin, follow.       Suspected aspiration pneumonia    Acute hypoxic respiratory failure 2/18/22  In the PACU 2/18, patient was requiring increased suctioning.  She was admitted to the intensive care unit overnight due to increased care needs.  Antibiotics were not initiated.  Chest x-ray without any evidence of infiltrate.  Oxygen was able to be weaned off by the afternoon of 2/19/2022.  However, she then had worsening oxygenation requirements that evening up to 4 LPM.  Suspicion is that this was secondary to volume replacement triggering a CHF exacerbation as below.  She developed a leukocytosis of 12.9 2/20 but this was after receiving steroids " evening of 2/18 while in the operating room.  - CT from 2/25 shows likely left lower lobe infiltrate, but not commented on.  Needing 2L oxygen 2/27.   started on IV clindamycin 2/27, checking procalcitonin and CRP.   Reassess including possible imaging 2/28     Acute on chronic diastolc heart failure  History of cardiomyopathy by echo 1/2022, resolved  Hypertension then hypotension 2/20/2022   Acute hypoxic respiratory failure 2/20/22  Hypertension  Patient was hypotensive morning of 2/19 secondary to her ACE inhibitor in the setting of hypovolemia from her bleeding.  She was given 500 cc LR bolus and this ameliorated her hypotension.  However 2/20/2022 developed evidence of hypervolemia with bilateral rhonchi and worsening peripheral edema and acute hypoxia up to 4 L O2 as outlined above.       At the time of her stroke 1/2022, patient was diagnosed with systolic heart failure with a newly low EF of 25% with global hypokinesis of the LV noted 1/3/2022.  The etiology of her cardiomyopathy was not identified.  She has been maintained on furosemide 40 mg once daily, this was held on admission due to hypotension     BNP  4603.  Procalcitonin 0.15. CXR 2/20/22 - Progressive patchy opacities in both bases likely reflects progressive atelectasis. No effusions or signs of failure. Heart is slightly enlarged but unchanged. Bones are quite demineralized. Held her lisinopril given hypotension requiring IV fluid bolus 2/19.  Received Furosemide 40 mg IV x2 2/21/2022.  Repeat Echo 2/21/2022 with normalized EF. Gave additional lasix 40 IV x1 2/21/2022   Did not give additional lasix IV 2/22/2022 due to increasing bicarb/alkalosis. Restarted Lisinopril 5mg (usual 20) 2/22/2022.      Procalcitonin 0.09   CRP 85     O2 needs stable 2 LPM, BPs stable, afebrile  Etiology unclear as diuresis did not resolve hypoxemia and developed alkalosis  Has rales on exam, but No JVD  Cannot rule out ongoing aspiration events   Weight stable,  below baseline   - hed usual Lasix 40 initially   - respiratory viral PCR panel, repeat Covid, influenza PCR all negative 2/23.    - Increased lisinopril to 10 mg twice daily 2/23  - Echo demonstrates improved EF, now 50-55%, normal RV, PAP > 40 mmHg with RAP > 15 mmHg  - Resumed lasix 20 mg daily 2/25    - I/O, daily weights       Dementia with behavioral issues  Depression  Anxiety  While at TCU, she had debilitating anxiety which she had prior to stroke but seems to have worsened since then.  Seem to be more calm 2/19/2022 and so her scheduled gabapentin 200 TID, Seroquel 12.5 BID, seroquel 6.25 po q6 prn were held to evaluate avoid oversedation but then significantly anxious 2/20/2022, was started on Haldol as needed.     Per daughter patient does not like to be in a room by herself alone during the day, gets fearful. Does better sitting out with the nurses     Received haldol x2 on admission and was somewhat somnolent after.    - Continue prior to admission sertraline 100 mg.    - Increased scheduled gabapentin to 300 TID 2/22/2022   - Changed Seroquel form 12.5 BID to 12.5 in AM and 18.725 at bedtime   - Continue seroquel 6.25 q 6h  prn  - doing ok with this.     Elevated LFTs, mild  CPK, GGT normal  - Discontinued rosuvastatin  - CT A/P demonstrates normal liver w/o biliary dilation or cholecystitis  - AST/ALT now trending down     History of CVA  Right hemiparesis  The patient is dependent for all ADLs and transfers.  Per daughter, she recently ambulated 8 feet at the transitional care.  Prior to her stroke in January, she was ambulatory and physically functional although had significant dementia.  Continue eliquis.  Off rosuvastatin due to liver enzymes as above.      Dysphagia  Nutrition PEG tube  Per daughter patient has actually been eating up to 50 to 50% of her meals as of late but she remains on tube feeds at this time.  - Reinitiated tube feeding with her home routine Jevity 1.5 Kiet continuous  "feeding at 40 cc/hr (will start with 10 cc an hour and advance by 10 every 4 hours until goal), as well as a dysphagia 1 pureed diet with honey thick liquids and free water flushes 130cc every 6 hours.     - Speech consult on  unable to be completed due to lack of dentures, but recommending continued mod thick liquids and pureed diet despite continued cough present on 1 of 12 bites.  - Nutrition consult to convert Jevity 1.5 to Osmolite 1.5 at discharge  - likely will need ongoing speech therapy on discharge.       Atrial fibrillation  Patient is in atrial fibrillation by EKG on admission and echo 2022   The patient is not on rate control medication, but rate is controlled.   -Held prior to admission apixaban due to bleeding, resumed .       Hypothyroidism  TSH 4.39  - Continue prior to admission levothyroxine.     Glaucoma  - Continue prior to admission Alphagan, Cosopt, and Xalatan.    Breast mass noted on abdominal CT  Radiology recommended mammogram, but likely will forego this given goals as below.       Goals of care  MD discussed with daughter Víctor 2022.  She wants Jamia to be comfortable.  She says that Jamia has had multiple family members who had fatal strokes but they all  right away.  She says she would be happy if Jamia fell asleep and did not wake up again, but she does not want to treat Jamia \"like an animal and drop her off the vet.\"     After the stroke, the patient's daughter elected to proceed with feeding tube placement.  (There is a palliative care note from her hospitalization at [facility] on 1/3/2022 that seems to imply that they had advised against feeding tube placement.) The patient has had a slow rehab progress at TCU, but has been able to ambulate a few feet on her own recently.Víctor is really struggling watching her mom's slow progress since the stroke. She is DNR/DNI.       Palliative care consultation 2022. Patients daughter " "reports she would like for patient to be able to do rehab and see how far she may progress.      Overweight  Estimated body mass index is 25.62 kg/m  as calculated from the following:    Height as of an earlier encounter on 2/18/22: 1.676 m (5' 6\").    Weight as of this encounter: 72 kg (158 lb 11.7 oz).     Moderate Malnutrition  Nutrition consulted - appreciate their input - Based on nutrition assessment   % Intake: adequate from TF regimen; does not meet criteria  % Weight Loss: 5% weight loss within one month  Subcutaneous Fat Loss: Unable to assess  Muscle Loss: Unable to assess  Fluid Accumulation/Edema: Mild- Moderate  Malnutrition Diagnosis: Moderate malnutrition in the context of acute on chronic illness     DVT Prophylaxis: VTE Prophylaxis contraindicated initially due to agitation and GI bleed, eliquis resumed 2/27    Richardson Catheter: Not present    Central Lines: None    Cardiac Monitoring: None    Code Status: No CPR- Do NOT Intubate      Diet  Orders Placed This Encounter      Pureed Diet (level 4) Liquidized/Moderately Thick (level 3)      Diet              Disposition  Likely discharge to LTC with hospice Monday if doing well.              Interval History:   Patient remains demented and fairly disoriented but was pleasant during my evaluation.  No pain or dyspnea.  No new concerns.  Thinks that someone is coming to get her today despite no plan for this.   No other pain             Review of Systems:    ROS: 10 point ROS neg other than the symptoms noted above in the HPI.           Medications:   Current active medications and PTA medications reviewed, see medication list for details.            Physical Exam:   Vitals were reviewed  Patient Vitals for the past 24 hrs:   BP Temp Temp src Pulse Resp SpO2 Weight   02/27/22 1557 132/52 97.9  F (36.6  C) Oral 71 18 94 % --   02/27/22 1201 (!) 153/69 98.4  F (36.9  C) Oral 90 20 92 % --   02/27/22 0756 (!) 161/70 98.1  F (36.7  C) Oral 86 20 96 % -- "   22 0422 (!) 150/61 97.9  F (36.6  C) Oral 73 22 93 % 70.6 kg (155 lb 10.3 oz)   22 2336 119/50 98.5  F (36.9  C) Oral 71 18 92 % --   22 1900 108/45 -- -- 73 -- -- --   22 1854 108/45 98.3  F (36.8  C) Axillary 82 18 90 % --       Temperatures:  Current - Temp: 97.9  F (36.6  C); Max - Temp  Av.2  F (36.8  C)  Min: 97.9  F (36.6  C)  Max: 98.5  F (36.9  C)  Respiration range: Resp  Av.3  Min: 18  Max: 22  Pulse range: Pulse  Av  Min: 71  Max: 90  Blood pressure range: Systolic (24hrs), Av , Min:108 , Max:161   ; Diastolic (24hrs), Av, Min:45, Max:70    Pulse oximetry range: SpO2  Av.8 %  Min: 90 %  Max: 96 %  I/O last 3 completed shifts:  In: 1338 [P.O.:118; NG/GT:1220]  Out: -     Intake/Output Summary (Last 24 hours) at 2022 1651  Last data filed at 2022 1625  Gross per 24 hour   Intake 1398 ml   Output --   Net 1398 ml     EXAM:  General: awake and alert, NAD, oriented x 1-2  Head: normocephalic  Neck: unremarkable, no lymphadenopathy   HEENT: oropharynx pink and moist    Heart: Regular rate and rhythm, no murmurs, rubs, or gallops  Lungs: clear to auscultation bilaterally with good air movement throughout  Abdomen: soft, non-tender, no masses or organomegaly  Extremities: no edema in lower extremities   Neuro: speech remains somewhat slurred, weakness unchanged.  No new changes on exam   Skin unremarkable.               Data:     Results for orders placed or performed during the hospital encounter of 22 (from the past 24 hour(s))   Phosphorus   Result Value Ref Range    Phosphorus 3.7 2.5 - 4.5 mg/dL   Comprehensive metabolic panel   Result Value Ref Range    Sodium 140 133 - 144 mmol/L    Potassium 3.8 3.4 - 5.3 mmol/L    Chloride 104 94 - 109 mmol/L    Carbon Dioxide (CO2) 35 (H) 20 - 32 mmol/L    Anion Gap 1 (L) 3 - 14 mmol/L    Urea Nitrogen 22 7 - 30 mg/dL    Creatinine 0.65 0.52 - 1.04 mg/dL    Calcium 8.2 (L) 8.5 - 10.1 mg/dL     Glucose 141 (H) 70 - 99 mg/dL    Alkaline Phosphatase 72 40 - 150 U/L     (H) 0 - 45 U/L     (H) 0 - 50 U/L    Protein Total 5.8 (L) 6.8 - 8.8 g/dL    Albumin 2.3 (L) 3.4 - 5.0 g/dL    Bilirubin Total 0.3 0.2 - 1.3 mg/dL    GFR Estimate 84 >60 mL/min/1.73m2   CBC with platelets   Result Value Ref Range    WBC Count 9.0 4.0 - 11.0 10e3/uL    RBC Count 3.01 (L) 3.80 - 5.20 10e6/uL    Hemoglobin 9.8 (L) 11.7 - 15.7 g/dL    Hematocrit 31.4 (L) 35.0 - 47.0 %     (H) 78 - 100 fL    MCH 32.6 26.5 - 33.0 pg    MCHC 31.2 (L) 31.5 - 36.5 g/dL    RDW 15.1 (H) 10.0 - 15.0 %    Platelet Count 335 150 - 450 10e3/uL           Attestation:  I have reviewed today's vital signs, notes, medications, labs and imaging.  Amount of time spent in direct patient care: 45 minutes.     Zeferino Green MD, MD

## 2022-02-27 NOTE — PLAN OF CARE
Patient was extremely restless this am with screaming out and throwing her bedding off of her. Had an incontinent stool/urine and once she was changed, repositioned she relaxed and has been calm ever since. Takes only a few bites at meal time and refuses to take anything more. Needs oxygen at 1 lpm per NC to keep saturation above 92%. Dips to 86-88% without it. Afebrile, VITAL SIGNS STABLE. Will monitor.

## 2022-02-27 NOTE — PLAN OF CARE
Patient A/O to self. Ate a few bites at dinner. Vitally stable, denies pain. Started feeding 80ml/12h and free water 130ml/6h at 1945. Patient incontinent.

## 2022-02-28 ENCOUNTER — APPOINTMENT (OUTPATIENT)
Dept: GENERAL RADIOLOGY | Facility: CLINIC | Age: 87
DRG: 377 | End: 2022-02-28
Attending: FAMILY MEDICINE
Payer: MEDICARE

## 2022-02-28 VITALS
SYSTOLIC BLOOD PRESSURE: 121 MMHG | DIASTOLIC BLOOD PRESSURE: 52 MMHG | RESPIRATION RATE: 18 BRPM | WEIGHT: 144.4 LBS | TEMPERATURE: 98.2 F | OXYGEN SATURATION: 95 % | HEART RATE: 90 BPM | BODY MASS INDEX: 23.31 KG/M2

## 2022-02-28 LAB
ANION GAP SERPL CALCULATED.3IONS-SCNC: 3 MMOL/L (ref 3–14)
BUN SERPL-MCNC: 20 MG/DL (ref 7–30)
CALCIUM SERPL-MCNC: 8.2 MG/DL (ref 8.5–10.1)
CHLORIDE BLD-SCNC: 102 MMOL/L (ref 94–109)
CO2 SERPL-SCNC: 31 MMOL/L (ref 20–32)
CREAT SERPL-MCNC: 0.56 MG/DL (ref 0.52–1.04)
CRP SERPL-MCNC: 52.2 MG/L (ref 0–8)
ERYTHROCYTE [DISTWIDTH] IN BLOOD BY AUTOMATED COUNT: 15.2 % (ref 10–15)
GFR SERPL CREATININE-BSD FRML MDRD: 86 ML/MIN/1.73M2
GLUCOSE BLD-MCNC: 171 MG/DL (ref 70–99)
GLUCOSE BLDC GLUCOMTR-MCNC: 108 MG/DL (ref 70–99)
GLUCOSE BLDC GLUCOMTR-MCNC: 155 MG/DL (ref 70–99)
GLUCOSE BLDC GLUCOMTR-MCNC: 165 MG/DL (ref 70–99)
HCT VFR BLD AUTO: 31.9 % (ref 35–47)
HGB BLD-MCNC: 9.6 G/DL (ref 11.7–15.7)
MCH RBC QN AUTO: 32.2 PG (ref 26.5–33)
MCHC RBC AUTO-ENTMCNC: 30.1 G/DL (ref 31.5–36.5)
MCV RBC AUTO: 107 FL (ref 78–100)
PHOSPHATE SERPL-MCNC: 3.5 MG/DL (ref 2.5–4.5)
PLATELET # BLD AUTO: 292 10E3/UL (ref 150–450)
POTASSIUM BLD-SCNC: 3.9 MMOL/L (ref 3.4–5.3)
RBC # BLD AUTO: 2.98 10E6/UL (ref 3.8–5.2)
SODIUM SERPL-SCNC: 136 MMOL/L (ref 133–144)
WBC # BLD AUTO: 8.2 10E3/UL (ref 4–11)

## 2022-02-28 PROCEDURE — 250N000011 HC RX IP 250 OP 636: Performed by: FAMILY MEDICINE

## 2022-02-28 PROCEDURE — 82310 ASSAY OF CALCIUM: CPT | Performed by: FAMILY MEDICINE

## 2022-02-28 PROCEDURE — 71045 X-RAY EXAM CHEST 1 VIEW: CPT

## 2022-02-28 PROCEDURE — 99239 HOSP IP/OBS DSCHRG MGMT >30: CPT | Performed by: FAMILY MEDICINE

## 2022-02-28 PROCEDURE — 85027 COMPLETE CBC AUTOMATED: CPT | Performed by: FAMILY MEDICINE

## 2022-02-28 PROCEDURE — 84100 ASSAY OF PHOSPHORUS: CPT | Performed by: FAMILY MEDICINE

## 2022-02-28 PROCEDURE — 86140 C-REACTIVE PROTEIN: CPT | Performed by: FAMILY MEDICINE

## 2022-02-28 PROCEDURE — 250N000013 HC RX MED GY IP 250 OP 250 PS 637: Performed by: FAMILY MEDICINE

## 2022-02-28 PROCEDURE — 250N000013 HC RX MED GY IP 250 OP 250 PS 637: Performed by: INTERNAL MEDICINE

## 2022-02-28 PROCEDURE — 36415 COLL VENOUS BLD VENIPUNCTURE: CPT | Performed by: FAMILY MEDICINE

## 2022-02-28 RX ORDER — LACTOBACILLUS RHAMNOSUS GG 10B CELL
1 CAPSULE ORAL 2 TIMES DAILY
DISCHARGE
Start: 2022-02-28 | End: 2022-03-05

## 2022-02-28 RX ORDER — CLINDAMYCIN HCL 150 MG
300 CAPSULE ORAL 3 TIMES DAILY
Qty: 30 CAPSULE | Refills: 0 | DISCHARGE
Start: 2022-02-28 | End: 2022-02-28

## 2022-02-28 RX ORDER — FUROSEMIDE 20 MG
20 TABLET ORAL DAILY
DISCHARGE
Start: 2022-02-28

## 2022-02-28 RX ORDER — CLINDAMYCIN HCL 150 MG
300 CAPSULE ORAL 3 TIMES DAILY
Qty: 30 CAPSULE | Refills: 0 | DISCHARGE
Start: 2022-02-28

## 2022-02-28 RX ADMIN — BRIMONIDINE TARTRATE 1 DROP: 2 SOLUTION/ DROPS OPHTHALMIC at 09:34

## 2022-02-28 RX ADMIN — SERTRALINE HYDROCHLORIDE 100 MG: 20 SOLUTION ORAL at 09:29

## 2022-02-28 RX ADMIN — FUROSEMIDE 20 MG: 20 TABLET ORAL at 09:12

## 2022-02-28 RX ADMIN — DORZOLAMIDE HYDROCHLORIDE AND TIMOLOL MALEATE 1 DROP: 20; 5 SOLUTION OPHTHALMIC at 09:33

## 2022-02-28 RX ADMIN — ACETAMINOPHEN 650 MG: 325 TABLET, FILM COATED ORAL at 06:22

## 2022-02-28 RX ADMIN — GABAPENTIN 300 MG: 300 CAPSULE ORAL at 09:12

## 2022-02-28 RX ADMIN — QUETIAPINE 12.5 MG: 25 TABLET ORAL at 09:12

## 2022-02-28 RX ADMIN — APIXABAN 2.5 MG: 2.5 TABLET, FILM COATED ORAL at 09:12

## 2022-02-28 RX ADMIN — Medication 6.25 MG: at 05:36

## 2022-02-28 RX ADMIN — LANSOPRAZOLE 30 MG: 15 TABLET, ORALLY DISINTEGRATING, DELAYED RELEASE ORAL at 06:17

## 2022-02-28 RX ADMIN — Medication 1 CAPSULE: at 09:12

## 2022-02-28 RX ADMIN — CLINDAMYCIN PHOSPHATE 900 MG: 900 INJECTION, SOLUTION INTRAVENOUS at 03:52

## 2022-02-28 RX ADMIN — LISINOPRIL 20 MG: 20 TABLET ORAL at 09:12

## 2022-02-28 RX ADMIN — SUCRALFATE 1 G: 1 SUSPENSION ORAL at 06:22

## 2022-02-28 ASSESSMENT — ACTIVITIES OF DAILY LIVING (ADL)
ADLS_ACUITY_SCORE: 45
ADLS_ACUITY_SCORE: 43
ADLS_ACUITY_SCORE: 45
ADLS_ACUITY_SCORE: 43
ADLS_ACUITY_SCORE: 45

## 2022-02-28 NOTE — PROGRESS NOTES
Patient sleeping on and off tonight. She initially refused all evening and HS medications, but did allow after re approaching several times. Have been encouraging fluids. Her urine has a strong foul odor. No bowel movement tonight.    Spot checking patient. Frequently pulling NC tubing off, patient seen to be 87% on room air at rest.

## 2022-02-28 NOTE — DISCHARGE INSTRUCTIONS
Apixaban (By mouth)  Apixaban (a-PIX-a-ban)    Treats and prevents blood clots. This medicine is a blood thinner.    Brand Name(s):Eliquis,Eliquis 30 Day DVT/PE Starter Pack  There may be other brand names for this medicine.    When This Medicine Should Not Be Used:  This medicine is not right for everyone. Do not use it if you had an allergic reaction to apixaban or you have active bleeding.    How to Use This Medicine:  Tablet    Your doctor will tell you how much medicine to use. Do not use more than directed.     If you are not able to swallow the tablets whole, they may be crushed and mixed in water, 5% dextrose in water (D5W), apple juice, or applesauce. The crushed tablets may be mixed with 60 mL of water or D5W dose and given through a nasogastric tube (NGT).    This medicine should come with a Medication Guide. Ask your pharmacist for a copy if you do not have one.     Missed dose: Take a dose as soon as you remember. If it is almost time for your next dose, wait until then and take a regular dose. Do not take extra medicine to make up for a missed dose.     Store the medicine in a closed container at room temperature, away from heat, moisture, and direct light.     Drugs and Foods to Avoid:  Ask your doctor or pharmacist before using any other medicine, including over-the-counter medicines, vitamins, and herbal products.    Some medicines can affect how apixaban works. Tell your doctor if you are using any of the following:     Carbamazepine, itraconazole, ketoconazole, phenytoin, rifampin, ritonavir, Larissa's wort    Blood thinner (including clopidogrel, heparin, prasugrel, warfarin)    Medicine to treat depression    NSAID pain or arthritis medicine (including aspirin, celecoxib, diclofenac, ibuprofen, naproxen)    Warnings While Using This Medicine:    Tell your doctor if you are pregnant or breastfeeding, or if you have kidney disease, liver disease, bleeding problems, antiphospholipid syndrome, or an  artificial heart valve.    Do not stop using this medicine suddenly without asking your doctor. You might have a higher risk of stroke for a short time after you stop using this medicine.    This medicine increases your risk for bleeding that can become serious if not controlled. You may also bruise easily, and it may take longer than usual for bleeding to stop.    This medicine may increase your risk for a hematoma (blood clot) in your spine or back if you undergo an epidural or spinal puncture. This could lead to paralysis. Tell your doctor if you ever had spine problems or back surgery.    Tell any doctor or dentist who treats you that you are using this medicine. With your doctor's supervision, you may need to stop using this medicine several days before you have surgery or medical tests.    Your doctor will do lab tests at regular visits to check on the effects of this medicine. Keep all appointments.     Keep all medicine out of the reach of children. Never share your medicine with anyone.     Possible Side Effects While Using This Medicine:  Call your doctor right away if you notice any of these side effects:    Allergic reaction: Itching or hives, swelling in your face or hands, swelling or tingling in your mouth or throat, chest tightness, trouble breathing    Change in how much or how often you urinate, red or pink urine    Chest pain, trouble breathing    Coughing up blood, vomiting blood or material that looks like coffee grounds    Numbness, tingling, or muscle weakness in your legs or feet    Red or black, tarry stools    Unusual bleeding, bruising, or weakness    If you notice other side effects that you think are caused by this medicine, tell your doctor.  Call your doctor for medical advice about side effects. You may report side effects to FDA at 5-730-FDA-0602

## 2022-02-28 NOTE — PROGRESS NOTES
[facility] DISCHARGE NOTE    Patient discharged to long term care facility at 11:04 AM via cart. Accompanied by other: EMS. Discharge instructions reviewed with caregiver HUMBERTO Almazan from care facility, opportunity offered to ask questions. Prescriptions sent to patients preferred pharmacy. All belongings sent with patient, including dentures.    Tracey Mullen RN

## 2022-02-28 NOTE — DISCHARGE SUMMARY
[facility] Discharge Summary    Jamia Coley MRN# [id number]   Age: 89 year old YOB: 1932     Date of Admission:  2/18/2022  Date of Discharge::  2/28/2022  Admitting Physician:  Sly Griffin MD  Discharge Physician:  Zeferino Green MD, MD             Admission Diagnoses:   Upper GI bleed [K92.2]  Gastrointestinal hemorrhage, unspecified gastrointestinal hemorrhage type [K92.2]          Principle Discharge Diagnosis:       PEG tube site bleeding  Acute blood loss anemia    See hospital course for further active diagnoses addressed during this admission.              Medications Prior to Admission:     Medications Prior to Admission   Medication Sig Dispense Refill Last Dose     acetaminophen (TYLENOL) 32 mg/mL liquid Take 650 mg by mouth 4 times daily 650 mg = 20.3 ml , and 650 mg q6h prn    2/17/2022 at 2300     alendronate (FOSAMAX) 70 MG tablet Take 70 mg by mouth every 7 days On Sundays   Past Week at Unknown time     apixaban ANTICOAGULANT (ELIQUIS) 2.5 MG tablet 2.5 mg by Per G Tube route 2 times daily   2/17/2022 at 2000     brimonidine (ALPHAGAN) 0.2 % ophthalmic solution Place 1 drop Into the left eye 2 times daily   2/18/2022 at 0800     dorzolamide-timolol PF (COSOPT) 22.3-6.8 MG/ML opthalmic solution Place 1 drop Into the left eye 2 times daily   2/18/2022 at 0800     latanoprost (XALATAN) 0.005 % ophthalmic solution Place 1 drop Into the left eye At Bedtime   2/17/2022 at 2200     levothyroxine (SYNTHROID) 150 MCG tablet 150 mcg by Per G Tube route every evening   2/17/2022 at 2000     lisinopril (ZESTRIL) 20 MG tablet 20 mg by Per G Tube route daily   2/17/2022 at 0800     multivitamin (OCUVITE) TABS tablet Take 1 tablet by mouth daily   2/17/2022 at 0800     potassium chloride (KAYCIEL) 20 MEQ/15ML (10%) solution 15 mLs by Per G Tube route daily   2/17/2022 at 0800     psyllium (METAMUCIL) 28 % packet 1 packet by Per G Tube route 2 times daily   2/17/2022 at 2000      QUEtiapine (SEROQUEL) 25 MG tablet Take 6.25 mg by mouth every 6 hours as needed Agitation and anxiety    2/17/2022 at 1700     sertraline (ZOLOFT) 20 MG/ML (HIGH CONC) solution 100 mg by Per G Tube route daily 5 ml = 100 mg    2/17/2022 at 0800     vitamin D3 (CHOLECALCIFEROL) 50 mcg (2000 units) tablet Take 1 tablet by mouth daily   2/17/2022 at 0800     [DISCONTINUED] furosemide (LASIX) 20 MG tablet 40 mg by Oral or Feeding Tube route daily   2/17/2022 at 0800     [DISCONTINUED] gabapentin (NEURONTIN) 100 MG capsule 200 mg tid scheduled  100 mg qid prn agitation   2/17/2022 at 2000     [DISCONTINUED] QUEtiapine (SEROQUEL) 25 MG tablet Take 12.5 mg by mouth 2 times daily   2/17/2022 at 1200     [DISCONTINUED] rosuvastatin (CRESTOR) 5 MG tablet 5 mg by Per G Tube route daily   2/17/2022 at 0800             Discharge Medications:     Current Discharge Medication List      START taking these medications    Details   clindamycin (CLEOCIN) 150 MG capsule Take 2 capsules (300 mg) by mouth 3 times daily for 5 days  Qty: 30 capsule, Refills: 0    Associated Diagnoses: Pneumonia of left lower lobe due to infectious organism      lactobacillus rhamnosus, GG, (CULTURELL) capsule 1 capsule by Per G Tube route 2 times daily for 5 days    Associated Diagnoses: Pneumonia of left lower lobe due to infectious organism      LANsoprazole (PREVACID SOLUTAB) 30 MG ODT 1 tablet (30 mg) by Per G Tube route 2 times daily (before meals)    Associated Diagnoses: Gastrointestinal hemorrhage, unspecified gastrointestinal hemorrhage type      sucralfate (CARAFATE) 1 GM/10ML suspension 10 mLs (1 g) by Per G Tube route 4 times daily (before meals and nightly)    Associated Diagnoses: Gastrointestinal hemorrhage, unspecified gastrointestinal hemorrhage type         CONTINUE these medications which have CHANGED    Details   !! furosemide (LASIX) 20 MG tablet Take 1 tablet (20 mg) by mouth daily    Associated Diagnoses: Essential hypertension       !! furosemide (LASIX) 20 MG tablet 1 tablet (20 mg) by Oral or Feeding Tube route daily    Associated Diagnoses: Ischemic cardiomyopathy      gabapentin (NEURONTIN) 300 MG capsule 1 capsule (300 mg) by Per G Tube route 3 times daily    Associated Diagnoses: History of CVA (cerebrovascular accident)      !! QUEtiapine (SEROQUEL) 25 MG tablet 0.5 tablets (12.5 mg) by Per G Tube route daily    Associated Diagnoses: Generalized anxiety disorder      !! QUEtiapine (SEROQUEL) 25 MG tablet 0.75 tablets (18.725 mg) by Per G Tube route At Bedtime    Associated Diagnoses: Generalized anxiety disorder       !! - Potential duplicate medications found. Please discuss with provider.      CONTINUE these medications which have NOT CHANGED    Details   acetaminophen (TYLENOL) 32 mg/mL liquid Take 650 mg by mouth 4 times daily 650 mg = 20.3 ml , and 650 mg q6h prn       alendronate (FOSAMAX) 70 MG tablet Take 70 mg by mouth every 7 days On Sundays      apixaban ANTICOAGULANT (ELIQUIS) 2.5 MG tablet 2.5 mg by Per G Tube route 2 times daily      brimonidine (ALPHAGAN) 0.2 % ophthalmic solution Place 1 drop Into the left eye 2 times daily      dorzolamide-timolol PF (COSOPT) 22.3-6.8 MG/ML opthalmic solution Place 1 drop Into the left eye 2 times daily      latanoprost (XALATAN) 0.005 % ophthalmic solution Place 1 drop Into the left eye At Bedtime      levothyroxine (SYNTHROID) 150 MCG tablet 150 mcg by Per G Tube route every evening      lisinopril (ZESTRIL) 20 MG tablet 20 mg by Per G Tube route daily      multivitamin (OCUVITE) TABS tablet Take 1 tablet by mouth daily      potassium chloride (KAYCIEL) 20 MEQ/15ML (10%) solution 15 mLs by Per G Tube route daily      psyllium (METAMUCIL) 28 % packet 1 packet by Per G Tube route 2 times daily      !! QUEtiapine (SEROQUEL) 25 MG tablet Take 6.25 mg by mouth every 6 hours as needed Agitation and anxiety       sertraline (ZOLOFT) 20 MG/ML (HIGH CONC) solution 100 mg by Per G Tube  route daily 5 ml = 100 mg       vitamin D3 (CHOLECALCIFEROL) 50 mcg (2000 units) tablet Take 1 tablet by mouth daily       !! - Potential duplicate medications found. Please discuss with provider.      STOP taking these medications       rosuvastatin (CRESTOR) 5 MG tablet Comments:   Reason for Stopping:                     Consultations:   Consultation during this admission received from palliative care          Brief History of Illness:     From Admission H+P:     Jamia Coley is a 89 year old female who lives at the assisted living facility at [facility] on [facility].  Yesterday she underwent replacement of a clogged G-tube.  Today, when her nurse pulled back to check for residuals, she noted bright red blood.  The patient later passed a black stool.  The patient was sent to the emergency department for further evaluation.  She had an EGD today which demonstrated bleeding from what appeared to be the previous PEG tube tract.  The area was injected and cauterized.  She was given a dose of IV Protonix.     She was admitted to [facility] ER in [facility] on January 1 with changes in speech and the left facial droop.  CTA showed a 70-90% proximal CADE stenosis and 50% LICA stenosis.  MRI demonstrated severe atrophy and severe chronic periventricular white matter disease and a small to moderate sized infarct of the central left lateral aspect of the clark and at tiny cortical infarct of the right occipital lobe.  The patient's stroke was thought possibly related to her R ICA stenosis, but she was not felt to be a good surgical candidate.     Review of systems is unable to be obtained due to patient's dementia.               TODAY:     Subjective:  No new concerns, patient remains pleasantly confused this AM, with mental status unchanged.  No fever or chills. No dypsnea.  No pain.  Oriented x 1 only.        ROS:   ROS: 10 point ROS neg other than the symptoms noted above in the HPI.   /52   Pulse 90  "  Temp 98.2  F (36.8  C) (Axillary)   Resp 18   Wt 65.5 kg (144 lb 6.4 oz)   SpO2 95%   BMI 23.31 kg/m     EXAM:  General: awake and alert, NAD, oriented x 1  Head: normocephalic  Neck: unremarkable, no lymphadenopathy   HEENT: oropharynx pink and moist    Heart: Regular rate and rhythm, no murmurs, rubs, or gallops  Lungs: clear to auscultation bilaterally with good air movement throughout  Abdomen: soft, non-tender, no masses or organomegaly  Extremities: trace edema in lower extremities   Skin some stasis changes lower extremities bilaterally, otherwise  unremarkable.            Hospital Course:         Jamia Coley is a 89 year old female admitted on 2/18/2022.  Patient with dementia who has resided in assisted living facility.  Unfortunately she had a infarct of the central/lateral clark in early January of this year.  This left her dependent for all ADLs and unable to ambulate initially.  Due to dysphagia, a feeding tube was placed and patient was discharged to TCU where she has been recovering since.  Her feeding tube was not functioning and so this was replaced 2/17 and then she presented to the hospital 2/18 with bleeding from the gastrostomy site.     Plan for discharge to LTC with hospice, see goals of care below.     Goals of care  Patients daughter initially said she wanted to pursue rehabilitation and see how far her mother would progress, but later changed her mind and decided that pursuing hospice would be more appropriate.  See palliative care note from 2/24 for details.       PEG tube site bleeding  Acute blood loss anemia  PEG tube was initially placed on 1/12/2022, but was replaced on 2/17/2022 due to tube clogging.  Hemoglobin decreased from 12.5-10.9 in the emergency department.  On January 28, 2022 hemoglobin was 13.2.  EGD on 2/18/2022 with \"Bleeding ulcer adjacent to intact gastrostomy tube.  This was controlled with 7mL of 1:10,000 epinephrine, 2 MRI conditional hemostatic clips, " "electrocautery\":  --No further overt bleeding  --Continue Carafate.   --Changed IV Protonix to lansoprazole solutab twice daily 2/24, continue this on discharge.    - held apixaban initially, resumed 2/27 with stable hemoglobin - can reassess with hospice if they would like to continue this or not with balance of goals and bleed vs stroke risk.    --Hgb remained stable, 9.6 on discharge.  Could recheck hemoglobin in 1 week, but not necessary if pursuing hospice cares as anticipated.       Suspected aspiration pneumonia    Acute hypoxic respiratory failure 2/18/22  In the PACU 2/18, patient was requiring increased suctioning.  She was admitted to the intensive care unit overnight due to increased care needs.  Antibiotics were not initiated.  Chest x-ray without any evidence of infiltrate.  Oxygen was able to be weaned off by the afternoon of 2/19/2022.  However, she then had worsening oxygenation requirements that evening up to 4 LPM.  Suspicion is that this was secondary to volume replacement triggering a CHF exacerbation as below.  She developed a leukocytosis of 12.9 2/20 but this was after receiving steroids evening of 2/18 while in the operating room.  - CT from 2/25 shows likely left lower lobe infiltrate, but not commented on.  Needing 2L oxygen 2/27.   started on IV clindamycin 2/27, procalcitonin low risk fro systemic infection, cannot rule out local infection.  CRP up but improving.    chest x-ray consistent with possible aspiration pneumonia - will complete 5 more days of oral clindamycin on discharge with lactobacillus while on this.        Acute on chronic diastolc heart failure  History of cardiomyopathy by echo 1/2022, resolved  Hypertension then hypotension 2/20/2022   Acute hypoxic respiratory failure 2/20/22  Hypertension  Patient was hypotensive morning of 2/19 secondary to her ACE inhibitor in the setting of hypovolemia from her bleeding.  She was given 500 cc LR bolus and this ameliorated her " hypotension.  However 2/20/2022 developed evidence of hypervolemia with bilateral rhonchi and worsening peripheral edema and acute hypoxia up to 4 L O2 as outlined above.       At the time of her stroke 1/2022, patient was diagnosed with systolic heart failure with a newly low EF of 25% with global hypokinesis of the LV noted 1/3/2022.  The etiology of her cardiomyopathy was not identified.  She has been maintained on furosemide 40 mg once daily, this was held on admission due to hypotension     BNP  4603.  Procalcitonin 0.15. CXR 2/20/22 - Progressive patchy opacities in both bases likely reflects progressive atelectasis. No effusions or signs of failure. Heart is slightly enlarged but unchanged. Bones are quite demineralized. Held her lisinopril given hypotension requiring IV fluid bolus 2/19.  Received Furosemide 40 mg IV x2 2/21/2022.  Repeat Echo 2/21/2022 with normalized EF. Gave additional lasix 40 IV x1 2/21/2022   Did not give additional lasix IV 2/22/2022 due to increasing bicarb/alkalosis. Restarted Lisinopril 5mg (usual 20) 2/22/2022.      Procalcitonin 0.09   CRP 85     O2 needs stable 2 LPM, BPs stable, afebrile  Etiology unclear as diuresis did not resolve hypoxemia and developed alkalosis  Has rales on exam, but No JVD  Cannot rule out ongoing aspiration events   Weight stable, below baseline   - hed usual Lasix 40 initially   - respiratory viral PCR panel, repeat Covid, influenza PCR all negative 2/23.    - Increased lisinopril to 10 mg twice daily 2/23  - Echo demonstrates improved EF, now 50-55%, normal RV, PAP > 40 mmHg with RAP > 15 mmHg  - Resumed lasix 20 mg daily 2/25, plan to continue this on discharge.     - I/O, daily weights        Dementia with behavioral issues  Depression  Anxiety  While at TCU, she had debilitating anxiety which she had prior to stroke but seems to have worsened since then.  Seem to be more calm 2/19/2022 and so her scheduled gabapentin 200 TID, Seroquel 12.5 BID,  seroquel 6.25 po q6 prn were held to evaluate avoid oversedation but then significantly anxious 2/20/2022, was started on Haldol as needed.     Per daughter patient does not like to be in a room by herself alone during the day, gets fearful. Does better sitting out with the nurses     Received haldol x2 on admission and was somewhat somnolent after.    - Continue prior to admission sertraline 100 mg.    - Increased scheduled gabapentin to 300 TID 2/22/2022   - Changed Seroquel form 12.5 BID to 12.5 in AM and 18.725 at bedtime   - Continue seroquel 6.25 q 6h  prn  - doing ok with this.  - continue seroquel on discharge, continue to reassess with hospice.           Elevated LFTs, mild  CPK, GGT normal  - Discontinued rosuvastatin  - CT A/P demonstrates normal liver w/o biliary dilation or cholecystitis  - AST/ALT now trending down - plan to remain off rosuvastatin on discharge given this and hospice goals       History of CVA  Right hemiparesis  The patient is dependent for all ADLs and transfers.  Per daughter, she recently ambulated 8 feet at the transitional care.  Prior to her stroke in January, she was ambulatory and physically functional although had significant dementia.  Continue eliquis.  Off rosuvastatin due to liver enzymes as above.      Dysphagia  Nutrition PEG tube  Per daughter patient has actually been eating up to 50 to 50% of her meals as of late but she remains on tube feeds at this time.  - Reinitiated tube feeding with her home routine Jevity 1.5 Kiet continuous feeding at 40 cc/hr (will start with 10 cc an hour and advance by 10 every 4 hours until goal), as well as a dysphagia 1 pureed diet with honey thick liquids and free water flushes 130cc every 6 hours.     - Speech consult on 2/24 unable to be completed due to lack of dentures, but recommending continued mod thick liquids and pureed diet despite continued cough present on 1 of 12 bites.  - Nutrition consult to convert Jevity 1.5 to Osmolite  "1.5 at discharge  - consider ongoing speech therapy on discharge, although can also take orals as tolerated if she'd prefer with hospice goals.        Atrial fibrillation  Patient is in atrial fibrillation by EKG on admission and echo 2/21/2022   The patient is not on rate control medication, but rate is controlled.   -Held prior to admission apixaban due to bleeding, resumed 2/27.  can reassess with hospice as above.      Hypothyroidism  TSH 4.39  - Continue prior to admission levothyroxine.     Glaucoma  - Continue prior to admission Alphagan, Cosopt, and Xalatan.     Breast mass noted on abdominal CT  Radiology recommended mammogram, but likely will forego this given goals as below.             Overweight  Estimated body mass index is 25.62 kg/m  as calculated from the following:    Height as of an earlier encounter on 2/18/22: 1.676 m (5' 6\").    Weight as of this encounter: 72 kg (158 lb 11.7 oz).     Moderate Malnutrition  Nutrition consulted - appreciate their input - Based on nutrition assessment   % Intake: adequate from TF regimen; does not meet criteria  % Weight Loss: 5% weight loss within one month  Subcutaneous Fat Loss: Unable to assess  Muscle Loss: Unable to assess  Fluid Accumulation/Edema: Mild- Moderate  Malnutrition Diagnosis: Moderate malnutrition in the context of acute on chronic illness     DVT Prophylaxis: VTE Prophylaxis contraindicated initially due to agitation and GI bleed, eliquis resumed 2/27     Richardson Catheter: Not present     Central Lines: None     Cardiac Monitoring: None     Code Status: No CPR- Do NOT Intubate                 Discharge Instructions and Follow-Up:     Discharge diet: Orders Placed This Encounter      Pureed Diet (level 4) Liquidized/Moderately Thick (level 3)      Diet       Discharge activity: Activity as tolerated   Discharge follow-up: Follow-up with provider at TCU with in 1 week and with hospice as planned.             Discharge Disposition:     Discharged " to LTC with plan for hospice      Attestation:  Amount of time performed on this discharge summary: 50 minutes.    Zeferino Green MD, MD

## 2022-02-28 NOTE — PLAN OF CARE
"Patient is alert and oriented to self. She has been cooperative with cares today. She ate breakfast up right in the chair and has since been resting in bed. She has yelled out for the \"door to be open\", door was open, she was looking at the ceiling. Patient easily redirected. She denies pain at rest but does report discomfort in her right arm when repositioning. No issues with G tube.   /52   Pulse 90   Temp 98.2  F (36.8  C) (Axillary)   Resp 18   Wt 65.5 kg (144 lb 6.4 oz)   SpO2 95%   BMI 23.31 kg/m             "

## 2022-02-28 NOTE — PROGRESS NOTES
Care Management Discharge Note    Discharge Date: 02/28/2022    Discharge Disposition: Long Term Care with hospice     Discharge Services: Transportation Services    Discharge DME: None    Discharge Transportation: agency    Private pay costs discussed: private room/amenity fees and transportation costs    PAS Confirmation Code:  (NA)  Patient/family educated on Medicare website which has current facility and service quality ratings: yes    Education Provided on the Discharge Plan:  yes  Persons Notified of Discharge Plans: Daughter, Víctor.  Patient/Family in Agreement with the Plan: yes    Handoff Referral Completed: Yes    Additional Information:  Per IDT rounds today, MD team states that pt is medically stable for discharge today.  Pt is accepted for cares at [facility] (Phone: [phone] Fax: [phone]) and will be enrolling with [facility] (phone [phone]/fax: [phone]).   Transportation discussed and [facility] stretcher with oxygen to transport at 11:00 AM.    Pt/family aware of costs associated with [facility] transportation services.  SW completed the PCS form.    PLAN OF CARE:  Pt to discharge to [facility] for LTC with [facility] at 11 AM.  Transport via [facility] stretcher with oxygen.    RICARDO Calderon

## 2023-07-06 NOTE — ANESTHESIA POSTPROCEDURE EVALUATION
Detail Level: Generalized Patient: Jamia Coley    Procedure: Procedure(s):  ESOPHAGOGASTRODUODENOSCOPY, WITH HEMORRHAGE CONTROL       Diagnosis:Upper GI bleed [K92.2]  Diagnosis Additional Information: No value filed.    Anesthesia Type:  No value filed.    Note:  Disposition: Inpatient   Postop Pain Control: Uneventful            Sign Out: Well controlled pain   PONV: No   Neuro/Psych: Uneventful            Sign Out: Acceptable/Baseline neuro status   Airway/Respiratory: Uneventful            Sign Out: Acceptable/Baseline resp. status   CV/Hemodynamics: Uneventful            Sign Out: Acceptable CV status; No obvious hypovolemia; No obvious fluid overload   Other NRE: NONE   DID A NON-ROUTINE EVENT OCCUR? No           Last vitals:  Vitals Value Taken Time   /91 02/18/22 1814   Temp 36.2  C (97.1  F) 02/18/22 1715   Pulse 89 02/18/22 1814   Resp 20 02/18/22 1800   SpO2 97 % 02/18/22 1826   Vitals shown include unvalidated device data.    Electronically Signed By: JOSH Witt CRNA  February 18, 2022  6:27 PM   Detail Level: Zone

## (undated) DEVICE — DEVICE ENDO CLIP INSTINCT PLUS INSC-P-7-230-S  G58010

## (undated) DEVICE — NDL SCLEROTHERAPY 25GA CARR-LOCK  00711811

## (undated) RX ORDER — ONDANSETRON 2 MG/ML
INJECTION INTRAMUSCULAR; INTRAVENOUS
Status: DISPENSED
Start: 2022-02-18